# Patient Record
Sex: MALE | Race: WHITE | Employment: FULL TIME | URBAN - METROPOLITAN AREA
[De-identification: names, ages, dates, MRNs, and addresses within clinical notes are randomized per-mention and may not be internally consistent; named-entity substitution may affect disease eponyms.]

---

## 2018-10-09 ENCOUNTER — APPOINTMENT (OUTPATIENT)
Dept: GENERAL RADIOLOGY | Age: 58
End: 2018-10-09
Attending: EMERGENCY MEDICINE
Payer: COMMERCIAL

## 2018-10-09 ENCOUNTER — HOSPITAL ENCOUNTER (EMERGENCY)
Age: 58
Discharge: HOME | End: 2018-10-09
Attending: EMERGENCY MEDICINE
Payer: COMMERCIAL

## 2018-10-09 VITALS
HEART RATE: 77 BPM | HEIGHT: 67 IN | RESPIRATION RATE: 18 BRPM | WEIGHT: 168 LBS | BODY MASS INDEX: 26.37 KG/M2 | TEMPERATURE: 98.1 F | DIASTOLIC BLOOD PRESSURE: 82 MMHG | SYSTOLIC BLOOD PRESSURE: 136 MMHG | OXYGEN SATURATION: 95 %

## 2018-10-09 DIAGNOSIS — M79.672 PAIN IN BOTH FEET: Primary | ICD-10-CM

## 2018-10-09 DIAGNOSIS — M79.671 PAIN IN BOTH FEET: Primary | ICD-10-CM

## 2018-10-09 PROCEDURE — 99283 EMERGENCY DEPT VISIT LOW MDM: CPT | Performed by: EMERGENCY MEDICINE

## 2018-10-09 PROCEDURE — 73630 X-RAY EXAM OF FOOT: CPT

## 2018-10-09 RX ORDER — HYDROCODONE BITARTRATE AND ACETAMINOPHEN 5; 325 MG/1; MG/1
1 TABLET ORAL
Qty: 12 TAB | Refills: 0 | Status: SHIPPED | OUTPATIENT
Start: 2018-10-09 | End: 2021-04-16 | Stop reason: ALTCHOICE

## 2018-10-09 RX ORDER — OMEPRAZOLE 40 MG/1
40 CAPSULE, DELAYED RELEASE ORAL DAILY
COMMUNITY
End: 2021-04-16 | Stop reason: ALTCHOICE

## 2018-10-09 RX ORDER — NAPROXEN 500 MG/1
500 TABLET ORAL 2 TIMES DAILY WITH MEALS
COMMUNITY
End: 2021-09-03 | Stop reason: ALTCHOICE

## 2018-10-09 NOTE — ED TRIAGE NOTES
Pt c/o bilateral foot pain and swelling for 3 weeks, states it actually started 3 years ago but he has just dealt with it. The pain and swelling that started 3 weeks ago is worse. Went to [facility] and they did blood work and eval. States was supposed to follow up with podiatrist but hasn't had a call for an appointment yet.

## 2018-10-09 NOTE — DISCHARGE INSTRUCTIONS
Take Tylenol ES two tablets every six hours  Elevate feet   Ice as needed  Norco at bedtime if needed

## 2018-10-10 NOTE — ED NOTES
I have reviewed discharge instructions with the patient. The patient verbalized understanding. Patient left ED via Discharge Method: ambulatory to Home with self. Opportunity for questions and clarification provided. Patient given 1 scripts. To continue your aftercare when you leave the hospital, you may receive an automated call from our care team to check in on how you are doing. This is a free service and part of our promise to provide the best care and service to meet your aftercare needs.  If you have questions, or wish to unsubscribe from this service please call [phone]. Thank you for Choosing our [facility].

## 2018-10-10 NOTE — ED PROVIDER NOTES
HPI Comments: Patient complains of swelling of his left foot onset 3 weeks ago. Top of the foot over the distal metatarsals area. Pain with walking. Also wakes up at night with walking. He reports chronic bilateral foot pain for several years. Has seen Dr. Lin Velez and has been diagnosed with plantar fasciitis and wore a boot on the right foot and did stretches. Improved. Takes Naprosyn 2 tablets every day for chronic back pain. Seen at Urgent Care 10 days ago and had blood work for gout evaluation. Treated with Prednisone 20 mg daily for 7 days. No change in the left foot swelling or pain. No fever. He works 6 a to 6 p four days a week. Some improvement in feet when resting and elevating. No injury. Patient is a 60 y.o. male presenting with foot swelling. The history is provided by the patient. Foot Swelling This is a new problem. Episode onset: 3 weeks ago. The problem occurs daily. The problem has not changed since onset. The pain is present in the right foot and left foot. The pain is moderate. Associated symptoms include stiffness and back pain. The symptoms are aggravated by activity. He has tried OTC pain medications, cold and heat for the symptoms. There has been no history of extremity trauma. Past Medical History:  
Diagnosis Date  Gastrointestinal disorder History reviewed. No pertinent surgical history. History reviewed. No pertinent family history. Social History Social History  Marital status:  Spouse name: N/A  
 Number of children: N/A  
 Years of education: N/A Occupational History  Not on file. Social History Main Topics  Smoking status: Former Smoker  Smokeless tobacco: Never Used  Alcohol use Yes Comment: every now and then  Drug use: No  
 Sexual activity: Not on file Other Topics Concern  Not on file Social History Narrative  No narrative on file ALLERGIES: Review of patient's allergies indicates no known allergies. Review of Systems Constitutional: Negative. HENT: Negative. Respiratory: Negative. Cardiovascular: Negative. Gastrointestinal: Negative. Genitourinary: Negative. Musculoskeletal: Positive for back pain, myalgias and stiffness. Skin: Negative. Neurological: Negative. Vitals:  
 10/09/18 1756 10/09/18 2004 BP:  136/82 Pulse: 76 77 Resp: 20 18 Temp: 98.1 °F (36.7 °C) SpO2: 98% 95% Weight: 76.2 kg (168 lb) Height: 5' 6.5\" (1.689 m) Physical Exam  
Constitutional: He is oriented to person, place, and time. He appears well-developed and well-nourished. Cardiovascular: Normal rate, regular rhythm, normal heart sounds and intact distal pulses. Pulmonary/Chest: Effort normal and breath sounds normal.  
Musculoskeletal:  
Left foot with tenderness and mild swelling dorsal aspect over the distal aspect of the 2, 3, 4 metatarsals. No redness. No fluctuance. FROM of toes and ankle. No ankle swelling or redness. Right foot normal.   
Neurological: He is alert and oriented to person, place, and time. He exhibits normal muscle tone. Skin: Skin is warm and dry. No rash noted. No erythema. Nursing note and vitals reviewed. MDM 
 
 
ED Course Procedures Left foot pain DDX - stress fracture / metatarsalgia / tendonitis Xray no fracture Results and instructions discussed with patient Rx Rush 5 po hs for unrelieved pain Elevation Ice Follow up with Dr. Small  Return with new or worse symptoms

## 2020-08-05 PROBLEM — I10 ESSENTIAL HYPERTENSION: Status: ACTIVE | Noted: 2020-08-05

## 2020-08-05 PROBLEM — M79.671 BILATERAL FOOT PAIN: Status: ACTIVE | Noted: 2020-08-05

## 2020-08-05 PROBLEM — M79.672 BILATERAL FOOT PAIN: Status: ACTIVE | Noted: 2020-08-05

## 2020-10-09 ENCOUNTER — HOSPITAL ENCOUNTER (OUTPATIENT)
Dept: DIABETES SERVICES | Age: 60
Discharge: HOME | End: 2020-10-09
Payer: COMMERCIAL

## 2020-10-09 PROCEDURE — G0108 DIAB MANAGE TRN  PER INDIV: HCPCS

## 2020-10-09 RX ORDER — NAPROXEN SODIUM 220 MG
220 TABLET ORAL 2 TIMES DAILY WITH MEALS
COMMUNITY

## 2020-10-09 RX ORDER — PHENOL/SODIUM PHENOLATE
20 AEROSOL, SPRAY (ML) MUCOUS MEMBRANE DAILY
COMMUNITY

## 2020-10-09 NOTE — PROGRESS NOTES
This is a one on one appointment. Due to being during DSOQI-12 public health emergency, social distancing and mandatory precautions are in place and utilized. Came for diabetes educational assessment today. Provided basic information on carbohydrates, proteins and fats. Educational need/plan: Will attend 2 nutrition/2 diabetes group sessions to address the following: diabetes disease process, nutritional management, physical activity, using medications, preventing complications, psychosocial adjustment, goal setting, problem solving, monitoring, behavior change strategies. Hopes to gain the following from this educational program: learn how to keep his blood sugar levels down to prevent complications of diabetes. Two things patient needs help with to improve diabetes: Understanding diabetes and diet education. Issues identified:  -New diagnosis. -Exercise: No formal exercise. Active at work. Bilateral foot pain. Medication changes:  Medication reconciliation to be done by RN.

## 2020-11-06 ENCOUNTER — HOSPITAL ENCOUNTER (OUTPATIENT)
Dept: DIABETES SERVICES | Age: 60
Discharge: HOME | End: 2020-11-06
Payer: COMMERCIAL

## 2020-11-06 PROCEDURE — G0109 DIAB MANAGE TRN IND/GROUP: HCPCS

## 2020-11-06 NOTE — PROGRESS NOTES
This is a class  appointment with limited persons allowed in class due to GLTST-60 public health emergency. Social distancing and mandatory precautions are in place and utilized. Attended nutrition diabetes #1 group session today. Topics included: disease process and treatment; carbohydrate choices (emphasizing high fiber carbohydrates); proteins (emphasizing heart healthy choices) and fat food choices (emphasizing unsaturated fats); free foods; combination food choices; nutrition tips for persons with diabetes; snack ideas; resources for diabetes management. Two methods of meal planning were reviewed: carbohydrate choices and carbohydrate counting. Basics of exercise discussed. Voiced /demonstrated understanding of material covered. Anticipated adherence is good. Problems/barriers may be: none anticipated. Encouraged a protein with his snacks. No new procedure or surgery since last visit. Medication changes. RN to do medication reconciliation.

## 2020-11-09 RX ORDER — LANOLIN ALCOHOL/MO/W.PET/CERES
12 CREAM (GRAM) TOPICAL
COMMUNITY
End: 2021-04-16 | Stop reason: ALTCHOICE

## 2020-11-13 ENCOUNTER — HOSPITAL ENCOUNTER (OUTPATIENT)
Dept: DIABETES SERVICES | Age: 60
Discharge: HOME | End: 2020-11-13
Payer: COMMERCIAL

## 2020-11-13 PROCEDURE — G0109 DIAB MANAGE TRN IND/GROUP: HCPCS

## 2020-11-13 NOTE — PROGRESS NOTES
This is a class  appointment with limited persons allowed in class due to [facility]-02 public health emergency. Social distancing and mandatory precautions are in place and utilized. Attended nutrition diabetes #2 group session today. Topics included: plate method for portion control; fiber and sodium guidelines; sugar substitutes; alcohol; eating out; recipe modification; label reading. Participant's goal: To lower A1C he will limit carbohydrates to 60 grams or less at most meals and re-evaluate every 4 months. Participant's diabetes support plan: Use the menu planning worksheet and grocery shopping guide. Barriers identified: None per pt. Voiced/demonstrated understanding of material covered. Anticipated adherence is good. No medication changes, procedures or surgeries since last visit. Plan for follow up is: will attend diabetes class.

## 2020-12-04 ENCOUNTER — HOSPITAL ENCOUNTER (OUTPATIENT)
Dept: DIABETES SERVICES | Age: 60
Discharge: HOME | End: 2020-12-04
Payer: COMMERCIAL

## 2020-12-04 PROCEDURE — G0109 DIAB MANAGE TRN IND/GROUP: HCPCS

## 2020-12-04 NOTE — PROGRESS NOTES
This is a class  appointment with limited persons allowed in class due to [facility]- public health emergency. Social distancing and mandatory precautions are in place and utilized. Participant attended Diabetes #1 session today. Topics included: Characteristics/pathophysiology type 1/type 2 diabetes; Goal/acceptable blood glucose ranges/Hgb A1C/interpreting/using results;meters, continuous glucose monitors and insulin pumps. Using medications safely; Sick day management; Prevention/detection/treatment of acute complications. - Verbalized understanding of material covered.  -Anticipated adherence is good   -Problems/barriers may be : none anticipated. Reports he is becoming more aware of his blood sugar levels by testing blood sugars. . He stated Johne Bell is a fernandez. \"  Reinforced that what he does with the education is up to him. He verbalized understanding. Medication Reconciliation Completed. No surgery or procedure.

## 2020-12-11 ENCOUNTER — HOSPITAL ENCOUNTER (OUTPATIENT)
Dept: DIABETES SERVICES | Age: 60
Discharge: HOME | End: 2020-12-11
Payer: COMMERCIAL

## 2020-12-11 PROCEDURE — G0109 DIAB MANAGE TRN IND/GROUP: HCPCS

## 2020-12-11 NOTE — PROGRESS NOTES
This is a class  appointment with limited persons allowed in class due to GJVAJ-84 public health emergency. Social distancing and mandatory precautions are in place and utilized. Participant attended Diabetes #2 session today. Topics included: Prevention/detection/treatment of chronic complications; sleep apnea; Developing strategies to promote health/change behavior/recommended screenings; Developing strategies to address psychosocial issues; Goal setting. Participants goal/support plan includes:        Diabetes Goal: To keep my blood sugars lower, I will exercise more. Two days a week starting 12- for 15 minutes a session. I will reevaluate on 6-1-2021 and increase as tolerated. Diabetes Support Plan: Refer to Diabetes Education Material.         Problems/barriers may be:none anticipated         Plan for follow up/Recommendations: mail follow up survey at 6 months and one year. Medication Reconciliation Completed. No new surgery or procedures.

## 2020-12-18 PROBLEM — E11.9 CONTROLLED TYPE 2 DIABETES MELLITUS WITHOUT COMPLICATION, WITHOUT LONG-TERM CURRENT USE OF INSULIN (HCC): Status: ACTIVE | Noted: 2020-12-18

## 2020-12-18 PROBLEM — G25.81 RESTLESS LEG SYNDROME: Status: ACTIVE | Noted: 2020-12-18

## 2021-09-03 PROBLEM — M54.50 LOW BACK PAIN POTENTIALLY ASSOCIATED WITH SPINAL STENOSIS: Status: ACTIVE | Noted: 2021-09-03

## 2021-09-03 PROBLEM — R29.898 WEAKNESS OF BOTH LEGS: Status: ACTIVE | Noted: 2021-09-03

## 2021-09-03 PROBLEM — M71.341 OTHER BURSAL CYST, RIGHT HAND: Status: ACTIVE | Noted: 2021-09-03

## 2021-09-03 PROBLEM — K21.9 GASTROESOPHAGEAL REFLUX DISEASE WITHOUT ESOPHAGITIS: Status: ACTIVE | Noted: 2021-09-03

## 2021-09-14 ENCOUNTER — HOSPITAL ENCOUNTER (OUTPATIENT)
Dept: MRI IMAGING | Age: 61
Discharge: HOME | End: 2021-09-14
Attending: INTERNAL MEDICINE
Payer: COMMERCIAL

## 2021-09-14 DIAGNOSIS — R29.898 WEAKNESS OF BOTH LEGS: ICD-10-CM

## 2021-09-14 DIAGNOSIS — M54.50 LOW BACK PAIN POTENTIALLY ASSOCIATED WITH SPINAL STENOSIS: ICD-10-CM

## 2021-09-14 DIAGNOSIS — M79.672 BILATERAL FOOT PAIN: ICD-10-CM

## 2021-09-14 DIAGNOSIS — M79.671 BILATERAL FOOT PAIN: ICD-10-CM

## 2021-09-14 PROCEDURE — 72148 MRI LUMBAR SPINE W/O DYE: CPT

## 2021-09-15 NOTE — PROGRESS NOTES
Does have significant DJD of spine and a disc herniation at L4  Needs to see if surgery is indicated  Refer to Neurosurgery if he is agreeable

## 2021-10-15 ENCOUNTER — HOSPITAL ENCOUNTER (OUTPATIENT)
Dept: PHYSICAL THERAPY | Age: 61
Discharge: HOME | End: 2021-10-15
Payer: COMMERCIAL

## 2021-10-15 PROCEDURE — 97161 PT EVAL LOW COMPLEX 20 MIN: CPT

## 2021-10-15 PROCEDURE — 97110 THERAPEUTIC EXERCISES: CPT

## 2021-10-15 NOTE — PROGRESS NOTES
Abiel Squires  : 1960  Payor: Yaron Melendez / Plan: [address] [facility] PPO / Product Type: PPO /  [id number] [facility] at [address] [place] [address]. Abigail Rincon., [address], [place], [address] [place] [address]  [phone]   [phone]                                                     OUTPATIENT PHYSICAL THERAPY: Daily Treatment Note 10/15/2021 Visit Count:  1    ICD-10: Low back pain (M54.5)  Metatarsalgia, right foot (M77.41)  Metatarsalgia, left foot (M77.42)    Precautions/Allergies:   Patient has no known allergies. MD Orders: Eval and Treat  MEDICAL/REFERRING DIAGNOSIS:  Spinal stenosis, lumbar region without neurogenic claudication [M48.061]  Other intervertebral disc displacement, lumbar region [M51.26]   DATE OF ONSET: Chronic  REFERRING PHYSICIAN: Perez Silva MD  RETURN PHYSICIAN APPOINTMENT: 2 months       Pre-treatment Symptoms/Complaints: See Initial Eval Dated 10/15/21 for more details. Pain: Initial:5/10 B feet, no LBP  Medications Last Reviewed:  10/15/2021     Post Session: 5/10 B feet, no LBP   Updated Objective Findings: See Initial Eval for more details. TREATMENT:   THERAPEUTIC EXERCISE: (15 min minutes):  Exercises per grid below to improve mobility, strength and balance. Required minimal visual, verbal and manual cues to promote proper body alignment and promote proper body posture. Progressed resistance and complexity of movement as indicated. Date:  10/15/2021 Date:   Date:     Activity/Exercise Parameters Parameters Parameters   Education HEP, POC, PT goals, anatomy/pathology, insulin resistance, walking and biking to assist with HTN management, strength training roles in pain and insulin resistance     L stretch instruction                                         THERAPEUTIC ACTIVITY: ( 0 minutes):  Activities per gid below to improve functional movement related mobility, strength and balance to improve neuro-muscular carryover to daily functional activities for improving patient's quality of life. Required visual, verbal and manual cues to promote proper body alignment and promote proper body posture/mechanics. Progressed resistance and complexity of movement as indicated. Date:  10/15/2021 Date:   Date:     Activity/Exercise Parameters Parameters Parameters                                                                               MANUAL THERAPY: (0 minutes): Joint mobilization, Soft tissue mobilization was utilized and necessary because of the patient's restricted joint motion and restricted motion of soft tissue mobility. Date  10/15/2021    Technique Used Grade  Level # Time(s) Effect while being performed                                                                 HEP Log Date 1.    10/15/2021   2.  10/15/2021   3. 10/15/2021   4.    5.           [employer] [facility]  Treatment/Session Summary:    Response to Treatment: Pt demonstrated understanding of POC and initial HEP. No increase in pain or adverse reactions. Communication/Consultation:  POC, HEP, PT goals, Faxed initial evaluation to MD.   Equipment provided today: Pt provided with several education sources to begin a strength training program in addition to information on insulin resistance and pain science   Recommendations/Intent for next treatment session:   eval only         Treatment Plan of Care Effective Dates: 10/15/2021 TO Today (10/15/2021).   Frequency/Duration: 1 time a week for 1 Days       Total Treatment Billable Duration:   15  Rx plus Eval   PT Patient Time In/Time Out  Time In: 1110  Time Out: 1200  Raven Hameed PT    Future Appointments   Date Time Provider Bren Cui   11/15/2021 11:15 AM Loly Torres MD [facility]AR[facility] CESARIO MRMD   12/8/2021  3:30 PM MD Shaye Archerlly[address]ie [address]   1/7/2022  9:40 AM Lori Holder MD [facility]

## 2021-10-15 NOTE — THERAPY EVALUATION
Mikayla Cerda  : 1960      Payor: Ricardo Soria / Plan: [address] [facility] PPO / Product Type: PPO /  Rafa Pena at [address] [facility]. [facility], [address] [place] [address]  [phone]   [phone]       OUTPATIENT PHYSICAL THERAPY:Initial Assessment 10/15/2021    ICD-10: Low back pain (M54.5)  Metatarsalgia, right foot (M77.41)  Metatarsalgia, left foot (M77.42)    Precautions/Allergies:   Patient has no known allergies. MD Orders: Eval and Treat  MEDICAL/REFERRING DIAGNOSIS:  Spinal stenosis, lumbar region without neurogenic claudication [M48.061]  Other intervertebral disc displacement, lumbar region [M51.26]   DATE OF ONSET: Chronic  REFERRING PHYSICIAN: Jami Renteria MD  RETURN PHYSICIAN APPOINTMENT: 2 months     INITIAL ASSESSMENT:  Mikayla Cerda presents to physical therapy with a history of chronic back pain and B neuropathic pain in feet due to complications frm diabetes. Pt reports that he has been able to self manage LBP with exercises implemented from physical therapy in . Pt major complaint today in his B foot pain and sensitivity. Pt pain pattern appears to be more consistent with diabetic neuropathy as pain intensity in L4-S1 distribution is unchanged through biomechanical means. Pt has begun an increased dose of gabapentin as on 10/14/21 and will undergo nerve conduction testing to confirm diagnosis of neuropathy. pt increased  gabapentin dose has not has time to produce a therapeutic response as at this point does not give an indication of his positive response. Mikayla Cerda reports he is able to complete all that is required of him except that he requires a prolonged amount of time to rest and recover due to sensitivity of B foot neuropathic pain. Therapist and pt discuss that therapy will not have effect on neuropathic pain, but that pt should continue a wellness program to promote health and strength.  Pt elects to complete exercises independently with therapist recommending several resources to begin a strengthening program and assist with blood glucose stability. Pt elects for eval only due to the above described condition. PT POC closed. TREATMENT PLAN:  Effective Dates: 10/15/2021 TO Today (10/15/2021). Frequency/Duration: 1 time a week for 1 Days    GOALS: (Goals have been discussed and agreed upon with patient.)   Short-Term Goals: 1 days  Goal Met   1. Tiara Duty will be verbalize understanding of importance of wellness program to manage LBP and diabetes and will be given educational material to assist in his understanding for home implementation. 1.  [] Date: 10/15/21     CLINICAL DECISION MAKING:      Outcome Measure: Tool Used: Modified Oswestry Low Back Pain Questionnaire - pt filled out form as it pertained to his B foot pain  Score:  Initial: 27/50  Most Recent: X/50 (Date: -- )   Interpretation of Score: Each section is scored on a 0-5 scale, 5 representing the greatest disability. The scores of each section are added together for a total score of 50. Total Duration: 30 minutes  PT Patient Time In/Time Out  Time In: 1110  Time Out: 1200    Regarding Waldemar Ryan's therapy, I certify that the treatment plan above will be carried out by a therapist or under their direction. Thank you for this referral,  Kumar Tran, PT     Referring Physician Signature: Christain Klinefelter, MD             HISTORY:   History of Present Injury/Illness (Reason for Referral):    Pt with chronic pain that began in B feet. R > L that appears more related to neuropathy that pt will get tested for Nov 15th. MD has increased gabapentin to assist with pain in B feet. Pt is on his feet for 12 plus hours and increased throughout the day. Pt works 4, 10 hour days. Pt reports that he has minimal back pain that he is able to manage with his physical therapy in 2004.  Pt has diabetes that has impacted bones in jaw with recurrent infections. Pt works as a  and reports intermittent issues with back pain with stooping and bending over - however able to manage with exercises from previous therapy. GOAL FOR THERAPY: to relieve foot pain    Note: Patient denies any increase of symptoms with cough, sneeze or valsalva. Patient denies any saddle paresthesia or bowel/bladder deficits. · Aggravating factors: Lifting and stooping adn squatting when in a flare up for low back   · Relieving factors: Rest    -Present symptoms/complaints (on day of evaluation)  Pain Scale:  · No back pain, however report constant B foot pain that can reach 5/10 that get worse as he is on his feet at work      Level of Function/Work/Activity:  Current functional level: B foot pain that increases with prolonged standing and walking, able to manage flare ups of back pain with therapy exercises  Prior level of Function: gradual increase in pain in B Feet,   Work/Hobbies:     Previous Treatment Approaches:  PT in 2004 for LBP    Past Medical History/Comorbidities:   Mr. 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 He also has no past medical history of Sleep apnea. Mr. Larisa Arrington  has no past surgical history on file.   Active Ambulatory Problems     Diagnosis Date Noted    Bilateral foot pain 08/05/2020    Essential hypertension 08/05/2020    Controlled type 2 diabetes mellitus without complication, without long-term current use of insulin ([address] 12/18/2020    Restless leg syndrome 12/18/2020    Gastroesophageal reflux disease without esophagitis 09/03/2021    Other bursal cyst, right hand 09/03/2021    Low back pain potentially associated with spinal stenosis 09/03/2021    Weakness of both legs 09/03/2021     Resolved Ambulatory Problems     Diagnosis Date Noted    No Resolved Ambulatory Problems     Past Medical History:   Diagnosis Date    Abnormal finding on EKG 08/05/2020    Chronic back pain     HTN (hypertension)     Hx stress fracture     Impingement syndrome of right shoulder 2020    Insomnia     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 Dominant Side right  Other Clinical Tests:  Imaging: YES radiographs    Current Medications:       Current Outpatient Medications:     metFORMIN (GLUCOPHAGE) 500 mg tablet, TAKE 1 TABLET BY MOUTH TWICE A DAY WITH MEALS, Disp: 180 Tablet, Rfl: 2    atorvastatin (LIPITOR) 20 mg tablet, Take 1 Tablet by mouth daily. , Disp: 30 Tablet, Rfl: 11    olmesartan (BENICAR) 20 mg tablet, TAKE 1 TABLET BY MOUTH EVERY DAY, Disp: 30 Tablet, Rfl: 5    gabapentin (NEURONTIN) 300 mg capsule, Take 1 Cap by mouth nightly., Disp: 90 Cap, Rfl: 2    naproxen sodium (NAPROSYN) 220 mg tablet, Take 220 mg by mouth two (2) times daily (with meals). Patient reports taking this 3 tabs 2 times per day  Indications: Reported on 10-9-2020 taking over the counter above, Disp: , Rfl:     Omeprazole delayed release (PRILOSEC D/R) 20 mg tablet, Take 20 mg by mouth daily. Indications: Reported 10-9-2020 taking over the counter. but likes to skip two times a week., Disp: , Rfl:    Date Last Reviewed:  10/15/2021         Ambulatory/Rehab Services H2 Model Falls Risk Assessment   Risk Factors:       (1)  Gender [Male] Ability to Rise from Chair:       (0)  Ability to rise in a single movement   Falls Prevention Plan:       No modifications necessary   Total: (5 or greater = High Risk): 2   ©2010 [employer]. All Rights Reserved.  [facility] States Patent #[id number]. Federal Law prohibits the replication, distribution or use without written permission from [facility] [employer] [facility]      Number of Personal Factors/Comorbidities that affect the Plan of Care: 0: LOW COMPLEXITY   EXAMINATION:     Functional Mobility: Affecting participation in basic ADLs and functional tasks. Gait Observation: none  Squat with downward reach: able to complete  Forward reach: able to complete no issues  Sit to Stand: able to complete, no issues    Palpation and Joint Mobility:         Inspection of back is normal     ROM:         Lumbar ROM (Qualitative)    Movement Range Descriptor   Flexion Hands to Shin Unremarkable   Extension Spine of Scapula to midline Unremarkable       AROM/PROM         Joint: Eval Date: 10/15/21  Re-Assess Date:  Re-Assess Date:    Active ROM RIGHT LEFT RIGHT LEFT RIGHT LEFT   Hip Flexion [facility] PEMBRO       Hip Extension [facility]       Knee Flexion [facility] WFL       Knee Extension [facility] WFL         Strength:     Eval Date: 10/15/21  Re-Assess Date:  Re-Assess Date:      RIGHT LEFT RIGHT LEFT RIGHT LEFT   Knee Flexion (L5-S2) 5/5 5/5       Knee Extension (L3, L4) 5/5 5/5       Hip Flexion (L1, L2) 5/5 5/5       Hip Extension 5/5 5/5       Hip Abduction (L5, S1) 5/5 5/5       Hip External Rotation 5/5 5/5       Ankle Dorsiflexion  5/5 5/5       Ankle Plantar Flexion (S1-S2) NT/20 Heel Raises NT/20 Heel Raises       Strength:  0-5 scale, 0 no muscle contraction; 1 no joint motion but contraction felt; 2 -less than full ROM in gravity eliminated; 2 full ROM in grav eliminated; 2+ full ROM in grav eliminated and natasha to withstand minimal resist; 3-less than full ROM against gravity; 3 full ROM against gravity;  3+ full ROM against gravity and able to withstand minimal resist; 4- full ROM against gravity and able to withstand less than mod resist; 4 full ROM against gravity and able to withstand mod resist; 5 full ROM against gravity and able to withstand max resist. Neurological Screen:    Neuropathic pain in the following:    · L4-5 (Dorsum foot)  · L5-S1 (Lateral foot)     Body Structures Involved:  1. Bones  2. Joints  3. Muscles  4. Ligaments Body Functions Affected:  1. Sensory/Pain  2. Neuromusculoskeletal  3. Movement Related Activities and Participation Affected:  1. Mobility  2.  Self Care   Number of elements that affect the Plan of Care: 1-2: LOW COMPLEXITY   CLINICAL PRESENTATION:   Presentation: Stable and uncomplicated: LOW COMPLEXITY   CLINICAL DECISION MAKING:      Use of outcome tool(s) and clinical judgement create a POC that gives a: Clear prediction of patient's progress: LOW COMPLEXITY

## 2022-01-07 PROBLEM — E78.2 MIXED HYPERLIPIDEMIA: Status: ACTIVE | Noted: 2022-01-07

## 2022-03-18 PROBLEM — E78.2 MIXED HYPERLIPIDEMIA: Status: ACTIVE | Noted: 2022-01-07

## 2022-03-18 PROBLEM — M71.341 OTHER BURSAL CYST, RIGHT HAND: Status: ACTIVE | Noted: 2021-09-03

## 2022-03-19 PROBLEM — M54.50 LOW BACK PAIN POTENTIALLY ASSOCIATED WITH SPINAL STENOSIS: Status: ACTIVE | Noted: 2021-09-03

## 2022-03-19 PROBLEM — E11.9 CONTROLLED TYPE 2 DIABETES MELLITUS WITHOUT COMPLICATION, WITHOUT LONG-TERM CURRENT USE OF INSULIN (HCC): Status: ACTIVE | Noted: 2020-12-18

## 2022-03-19 PROBLEM — R29.898 WEAKNESS OF BOTH LEGS: Status: ACTIVE | Noted: 2021-09-03

## 2022-03-19 PROBLEM — M79.672 BILATERAL FOOT PAIN: Status: ACTIVE | Noted: 2020-08-05

## 2022-03-19 PROBLEM — M79.671 BILATERAL FOOT PAIN: Status: ACTIVE | Noted: 2020-08-05

## 2022-03-19 PROBLEM — G25.81 RESTLESS LEG SYNDROME: Status: ACTIVE | Noted: 2020-12-18

## 2022-03-19 PROBLEM — K21.9 GASTROESOPHAGEAL REFLUX DISEASE WITHOUT ESOPHAGITIS: Status: ACTIVE | Noted: 2021-09-03

## 2022-03-19 PROBLEM — I10 ESSENTIAL HYPERTENSION: Status: ACTIVE | Noted: 2020-08-05

## 2022-07-08 ENCOUNTER — OFFICE VISIT (OUTPATIENT)
Dept: INTERNAL MEDICINE CLINIC | Facility: CLINIC | Age: 62
End: 2022-07-08
Payer: COMMERCIAL

## 2022-07-08 VITALS
HEIGHT: 66 IN | SYSTOLIC BLOOD PRESSURE: 124 MMHG | DIASTOLIC BLOOD PRESSURE: 60 MMHG | WEIGHT: 168 LBS | BODY MASS INDEX: 27 KG/M2

## 2022-07-08 DIAGNOSIS — M53.3 CHRONIC SI JOINT PAIN: ICD-10-CM

## 2022-07-08 DIAGNOSIS — E78.2 MIXED HYPERLIPIDEMIA: ICD-10-CM

## 2022-07-08 DIAGNOSIS — I10 ESSENTIAL HYPERTENSION: ICD-10-CM

## 2022-07-08 DIAGNOSIS — E11.9 CONTROLLED TYPE 2 DIABETES MELLITUS WITHOUT COMPLICATION, WITHOUT LONG-TERM CURRENT USE OF INSULIN (HCC): ICD-10-CM

## 2022-07-08 DIAGNOSIS — G25.81 RESTLESS LEG SYNDROME: ICD-10-CM

## 2022-07-08 DIAGNOSIS — K21.9 GASTROESOPHAGEAL REFLUX DISEASE WITHOUT ESOPHAGITIS: ICD-10-CM

## 2022-07-08 DIAGNOSIS — E11.9 CONTROLLED TYPE 2 DIABETES MELLITUS WITHOUT COMPLICATION, WITHOUT LONG-TERM CURRENT USE OF INSULIN (HCC): Primary | ICD-10-CM

## 2022-07-08 DIAGNOSIS — E11.9 ENCOUNTER FOR DIABETIC FOOT EXAM (HCC): ICD-10-CM

## 2022-07-08 DIAGNOSIS — Z12.5 PROSTATE CANCER SCREENING: ICD-10-CM

## 2022-07-08 DIAGNOSIS — Q66.70 HIGH FOOT ARCH: ICD-10-CM

## 2022-07-08 DIAGNOSIS — G89.29 CHRONIC SI JOINT PAIN: ICD-10-CM

## 2022-07-08 LAB
BASOPHILS # BLD: 0.1 K/UL (ref 0–0.2)
BASOPHILS NFR BLD: 1 % (ref 0–2)
DIFFERENTIAL METHOD BLD: ABNORMAL
EOSINOPHIL # BLD: 0.3 K/UL (ref 0–0.8)
EOSINOPHIL NFR BLD: 4 % (ref 0.5–7.8)
ERYTHROCYTE [DISTWIDTH] IN BLOOD BY AUTOMATED COUNT: 14 % (ref 11.9–14.6)
HCT VFR BLD AUTO: 42.3 % (ref 41.1–50.3)
HGB BLD-MCNC: 13.3 G/DL (ref 13.6–17.2)
IMM GRANULOCYTES # BLD AUTO: 0 K/UL (ref 0–0.5)
IMM GRANULOCYTES NFR BLD AUTO: 0 % (ref 0–5)
LYMPHOCYTES # BLD: 2.4 K/UL (ref 0.5–4.6)
LYMPHOCYTES NFR BLD: 31 % (ref 13–44)
MCH RBC QN AUTO: 29.7 PG (ref 26.1–32.9)
MCHC RBC AUTO-ENTMCNC: 31.4 G/DL (ref 31.4–35)
MCV RBC AUTO: 94.4 FL (ref 79.6–97.8)
MONOCYTES # BLD: 0.9 K/UL (ref 0.1–1.3)
MONOCYTES NFR BLD: 12 % (ref 4–12)
NEUTS SEG # BLD: 4 K/UL (ref 1.7–8.2)
NEUTS SEG NFR BLD: 52 % (ref 43–78)
NRBC # BLD: 0 K/UL (ref 0–0.2)
PLATELET # BLD AUTO: 134 K/UL (ref 150–450)
PMV BLD AUTO: 14.1 FL (ref 9.4–12.3)
RBC # BLD AUTO: 4.48 M/UL (ref 4.23–5.6)
WBC # BLD AUTO: 7.7 K/UL (ref 4.3–11.1)

## 2022-07-08 PROCEDURE — 3051F HG A1C>EQUAL 7.0%<8.0%: CPT | Performed by: INTERNAL MEDICINE

## 2022-07-08 PROCEDURE — 99214 OFFICE O/P EST MOD 30 MIN: CPT | Performed by: INTERNAL MEDICINE

## 2022-07-08 RX ORDER — LANOLIN ALCOHOL/MO/W.PET/CERES
1000 CREAM (GRAM) TOPICAL DAILY
COMMUNITY

## 2022-07-08 RX ORDER — PREGABALIN 150 MG/1
150 CAPSULE ORAL 2 TIMES DAILY
COMMUNITY

## 2022-07-08 RX ORDER — MULTIVIT-MIN/IRON/FOLIC ACID/K 18-600-40
CAPSULE ORAL
COMMUNITY

## 2022-07-08 ASSESSMENT — PATIENT HEALTH QUESTIONNAIRE - PHQ9
SUM OF ALL RESPONSES TO PHQ QUESTIONS 1-9: 0
2. FEELING DOWN, DEPRESSED OR HOPELESS: 0
SUM OF ALL RESPONSES TO PHQ QUESTIONS 1-9: 0
SUM OF ALL RESPONSES TO PHQ QUESTIONS 1-9: 0
1. LITTLE INTEREST OR PLEASURE IN DOING THINGS: 0
SUM OF ALL RESPONSES TO PHQ QUESTIONS 1-9: 0
SUM OF ALL RESPONSES TO PHQ9 QUESTIONS 1 & 2: 0

## 2022-07-08 ASSESSMENT — ENCOUNTER SYMPTOMS
BACK PAIN: 1
CHEST TIGHTNESS: 0
SHORTNESS OF BREATH: 0

## 2022-07-08 NOTE — PROGRESS NOTES
HPI: Myrna Solorio (: 1960)    Wants to make sure his heart is ok as his boss just had quad bypass  He has risk factors for ASCVD as well    He is now on Lyrica for foot pain but he states the Gabapentin helps the RLS better  Did recently get a new pair of shoes - he walks on the balls of his feet and can walk over 10 K steps daily    Need to update labs    Problem List:  Patient Active Problem List   Diagnosis    Mixed hyperlipidemia    Other bursal cyst, right hand    Low back pain potentially associated with spinal stenosis    Restless leg syndrome    Controlled type 2 diabetes mellitus without complication, without long-term current use of insulin (HCC)    Weakness of both legs    Bilateral foot pain    Gastroesophageal reflux disease without esophagitis    Essential hypertension    High foot arch    Chronic SI joint pain       History:  Past Medical History:   Diagnosis Date    Abnormal finding on EKG 2020    low voltage and incomplete RBBB    Bilateral foot pain     Chronic back pain     HTN (hypertension)     Hx stress fracture     left foot    Impingement syndrome of right shoulder     Dr. Earl Chappell Insomnia     per pt work up with pulmonary- no sleep apnea    Periodontal disease due to type 2 diabetes mellitus ([facility]     Dr. Kiki Hugo- dentist    Plantar fasciitis, bilateral     RLS (restless legs syndrome)        Allergies:  No Known Allergies    Current Medications:  Current Outpatient Medications   Medication Sig Dispense Refill    pregabalin (LYRICA) 150 MG capsule Take 150 mg by mouth 2 times daily.       vitamin B-12 (CYANOCOBALAMIN) 1000 MCG tablet Take 1,000 mcg by mouth daily      Cholecalciferol (VITAMIN D) 50 MCG ( UT) CAPS capsule Take by mouth      metFORMIN (GLUCOPHAGE) 500 MG tablet TAKE 1 TABLET BY MOUTH TWICE A DAY WITH MEALS 180 tablet 1    atorvastatin (LIPITOR) 20 MG tablet Take 20 mg by mouth daily      naproxen sodium (ANAPROX) 220 MG tablet Take 220 mg by mouth 2 times daily (with meals)      olmesartan (BENICAR) 20 MG tablet TAKE 1 TABLET BY MOUTH EVERY DAY      omeprazole (PRILOSEC OTC) 20 MG tablet Take 20 mg by mouth daily       No current facility-administered medications for this visit. Review of Systems:  Review of Systems   Constitutional: Negative for unexpected weight change. Respiratory: Negative for chest tightness and shortness of breath. Cardiovascular: Negative for chest pain. Musculoskeletal: Positive for back pain and gait problem. Bilateral foot pain   Psychiatric/Behavioral: Positive for sleep disturbance. Restless legs   All other systems reviewed and are negative. Vitals:  /60   Ht 5' 6\" (1.676 m)   Wt 168 lb (76.2 kg)   BMI 27.12 kg/m²     Physical Exam:  Physical Exam  Vitals reviewed. Constitutional:       Appearance: Normal appearance. HENT:      Head: Normocephalic and atraumatic. Eyes:      Extraocular Movements: Extraocular movements intact. Pupils: Pupils are equal, round, and reactive to light. Cardiovascular:      Rate and Rhythm: Normal rate and regular rhythm. Heart sounds: Normal heart sounds. Pulmonary:      Effort: Pulmonary effort is normal.      Breath sounds: Normal breath sounds. Musculoskeletal:         General: Normal range of motion. Cervical back: Normal range of motion and neck supple. Comments: High arches   Skin:     General: Skin is warm and dry. Findings: No lesion. Comments: No callous or ulcers noted on DFE   Neurological:      General: No focal deficit present. Mental Status: He is alert and oriented to person, place, and time. Psychiatric:         Mood and Affect: Mood normal.         Behavior: Behavior normal.         Thought Content: Thought content normal.         Judgment: Judgment normal.          Assessment/Plan:   Jorge Garcia was seen today for follow-up.     Diagnoses and all orders for this visit:    Controlled type 2 diabetes mellitus without complication, without long-term current use of insulin (HCC)  -     CBC with Auto Differential; Future  -     Comprehensive Metabolic Panel; Future  -     Hemoglobin A1C; Future    Mixed hyperlipidemia  -     Lipid Panel; Future    Essential hypertension  -     CBC with Auto Differential; Future  -     Comprehensive Metabolic Panel; Future  -     CT CARDIAC CALCIUM SCORING; Future  BP is controlled with current med  Gastroesophageal reflux disease without esophagitis  Continue Prilosec  Prostate cancer screening  -     PSA Screening; Future    Encounter for diabetic foot exam ([facility]  Does have high arches and tenderness - ball of foot  But no ulcers or callous formation  High foot arch    Chronic SI joint pain  Has had to see chiropractor in the past  Restless leg syndrome  Try Tonic water with Quinine at bedtime          Current medications are therapeutic at this time; continue as prescribed.         Kayla Corona MD

## 2022-07-09 LAB
ALBUMIN SERPL-MCNC: 4.2 G/DL (ref 3.2–4.6)
ALBUMIN/GLOB SERPL: 1.4 {RATIO} (ref 1.2–3.5)
ALP SERPL-CCNC: 140 U/L (ref 50–136)
ALT SERPL-CCNC: 66 U/L (ref 12–65)
ANION GAP SERPL CALC-SCNC: 12 MMOL/L (ref 7–16)
AST SERPL-CCNC: 35 U/L (ref 15–37)
BILIRUB SERPL-MCNC: 0.5 MG/DL (ref 0.2–1.1)
BUN SERPL-MCNC: 24 MG/DL (ref 8–23)
CALCIUM SERPL-MCNC: 9.7 MG/DL (ref 8.3–10.4)
CHLORIDE SERPL-SCNC: 110 MMOL/L (ref 98–107)
CHOLEST SERPL-MCNC: 120 MG/DL
CO2 SERPL-SCNC: 19 MMOL/L (ref 21–32)
CREAT SERPL-MCNC: 1.2 MG/DL (ref 0.8–1.5)
EST. AVERAGE GLUCOSE BLD GHB EST-MCNC: 166 MG/DL
GLOBULIN SER CALC-MCNC: 3 G/DL (ref 2.3–3.5)
GLUCOSE SERPL-MCNC: 185 MG/DL (ref 65–100)
HBA1C MFR BLD: 7.4 % (ref 4.8–5.6)
HDLC SERPL-MCNC: 29 MG/DL (ref 40–60)
HDLC SERPL: 4.1 {RATIO}
LDLC SERPL CALC-MCNC: 46.8 MG/DL
POTASSIUM SERPL-SCNC: 5.6 MMOL/L (ref 3.5–5.1)
PROT SERPL-MCNC: 7.2 G/DL (ref 6.3–8.2)
PSA SERPL-MCNC: 1.1 NG/ML
SODIUM SERPL-SCNC: 141 MMOL/L (ref 138–145)
TRIGL SERPL-MCNC: 221 MG/DL (ref 35–150)
VLDLC SERPL CALC-MCNC: 44.2 MG/DL (ref 6–23)

## 2022-07-11 DIAGNOSIS — D64.9 ANEMIA, UNSPECIFIED TYPE: Primary | ICD-10-CM

## 2022-07-11 DIAGNOSIS — I10 ESSENTIAL HYPERTENSION: ICD-10-CM

## 2022-07-11 DIAGNOSIS — E87.5 HYPERKALEMIA: ICD-10-CM

## 2022-07-15 DIAGNOSIS — D64.9 ANEMIA, UNSPECIFIED TYPE: ICD-10-CM

## 2022-07-15 DIAGNOSIS — I10 ESSENTIAL HYPERTENSION: ICD-10-CM

## 2022-07-15 DIAGNOSIS — E87.5 HYPERKALEMIA: ICD-10-CM

## 2022-07-15 LAB
ANION GAP SERPL CALC-SCNC: 2 MMOL/L (ref 7–16)
BASOPHILS # BLD: 0.1 K/UL (ref 0–0.2)
BASOPHILS NFR BLD: 1 % (ref 0–2)
BUN SERPL-MCNC: 24 MG/DL (ref 8–23)
CALCIUM SERPL-MCNC: 9 MG/DL (ref 8.3–10.4)
CHLORIDE SERPL-SCNC: 109 MMOL/L (ref 98–107)
CO2 SERPL-SCNC: 28 MMOL/L (ref 21–32)
CREAT SERPL-MCNC: 1.1 MG/DL (ref 0.8–1.5)
DIFFERENTIAL METHOD BLD: ABNORMAL
EOSINOPHIL # BLD: 0.2 K/UL (ref 0–0.8)
EOSINOPHIL NFR BLD: 3 % (ref 0.5–7.8)
ERYTHROCYTE [DISTWIDTH] IN BLOOD BY AUTOMATED COUNT: 13.9 % (ref 11.9–14.6)
GLUCOSE SERPL-MCNC: 152 MG/DL (ref 65–100)
HCT VFR BLD AUTO: 41.7 % (ref 41.1–50.3)
HGB BLD-MCNC: 13.1 G/DL (ref 13.6–17.2)
IMM GRANULOCYTES # BLD AUTO: 0 K/UL (ref 0–0.5)
IMM GRANULOCYTES NFR BLD AUTO: 0 % (ref 0–5)
IRON SERPL-MCNC: 63 UG/DL (ref 35–150)
LYMPHOCYTES # BLD: 2 K/UL (ref 0.5–4.6)
LYMPHOCYTES NFR BLD: 37 % (ref 13–44)
MCH RBC QN AUTO: 29.6 PG (ref 26.1–32.9)
MCHC RBC AUTO-ENTMCNC: 31.4 G/DL (ref 31.4–35)
MCV RBC AUTO: 94.1 FL (ref 79.6–97.8)
MONOCYTES # BLD: 0.5 K/UL (ref 0.1–1.3)
MONOCYTES NFR BLD: 10 % (ref 4–12)
NEUTS SEG # BLD: 2.7 K/UL (ref 1.7–8.2)
NEUTS SEG NFR BLD: 50 % (ref 43–78)
NRBC # BLD: 0 K/UL (ref 0–0.2)
PLATELET # BLD AUTO: 138 K/UL (ref 150–450)
PMV BLD AUTO: 14 FL (ref 9.4–12.3)
POTASSIUM SERPL-SCNC: 4.9 MMOL/L (ref 3.5–5.1)
RBC # BLD AUTO: 4.43 M/UL (ref 4.23–5.6)
SODIUM SERPL-SCNC: 139 MMOL/L (ref 138–145)
VIT B12 SERPL-MCNC: 943 PG/ML (ref 193–986)
WBC # BLD AUTO: 5.4 K/UL (ref 4.3–11.1)

## 2022-07-29 ENCOUNTER — HOSPITAL ENCOUNTER (OUTPATIENT)
Dept: CT IMAGING | Age: 62
Discharge: HOME | End: 2022-08-01

## 2022-07-29 DIAGNOSIS — I10 ESSENTIAL HYPERTENSION: ICD-10-CM

## 2022-07-29 PROCEDURE — 75571 CT HRT W/O DYE W/CA TEST: CPT

## 2022-07-31 DIAGNOSIS — E78.2 MIXED HYPERLIPIDEMIA: ICD-10-CM

## 2022-07-31 DIAGNOSIS — R93.1 ABNORMAL HEART SCORE CT: ICD-10-CM

## 2022-07-31 DIAGNOSIS — E11.9 CONTROLLED TYPE 2 DIABETES MELLITUS WITHOUT COMPLICATION, WITHOUT LONG-TERM CURRENT USE OF INSULIN (HCC): ICD-10-CM

## 2022-07-31 DIAGNOSIS — I10 ESSENTIAL HYPERTENSION: Primary | ICD-10-CM

## 2022-08-01 RX ORDER — OLMESARTAN MEDOXOMIL 20 MG/1
TABLET ORAL
Qty: 90 TABLET | Refills: 1 | Status: SHIPPED | OUTPATIENT
Start: 2022-08-01

## 2022-08-30 RX ORDER — ATORVASTATIN CALCIUM 20 MG/1
TABLET, FILM COATED ORAL
Qty: 90 TABLET | Refills: 2 | Status: ON HOLD | OUTPATIENT
Start: 2022-08-30 | End: 2022-10-21 | Stop reason: SDUPTHER

## 2022-09-06 DIAGNOSIS — D64.9 ANEMIA, UNSPECIFIED TYPE: Primary | ICD-10-CM

## 2022-09-06 LAB
FECAL OCCULT BLOOD #2, POC: NEGATIVE
FECAL OCCULT BLOOD #3, POC: POSITIVE
HEMOCCULT STL QL: NEGATIVE
VALID INTERNAL CONTROL: YES

## 2022-09-06 PROCEDURE — 82270 OCCULT BLOOD FECES: CPT | Performed by: INTERNAL MEDICINE

## 2022-09-09 ENCOUNTER — INITIAL CONSULT (OUTPATIENT)
Dept: CARDIOLOGY CLINIC | Age: 62
End: 2022-09-09
Payer: COMMERCIAL

## 2022-09-09 VITALS
HEART RATE: 61 BPM | SYSTOLIC BLOOD PRESSURE: 140 MMHG | BODY MASS INDEX: 27.35 KG/M2 | HEIGHT: 66 IN | DIASTOLIC BLOOD PRESSURE: 70 MMHG | WEIGHT: 170.2 LBS

## 2022-09-09 DIAGNOSIS — E11.9 CONTROLLED TYPE 2 DIABETES MELLITUS WITHOUT COMPLICATION, WITHOUT LONG-TERM CURRENT USE OF INSULIN (HCC): ICD-10-CM

## 2022-09-09 DIAGNOSIS — D64.9 ANEMIA, UNSPECIFIED TYPE: ICD-10-CM

## 2022-09-09 DIAGNOSIS — E78.2 MIXED HYPERLIPIDEMIA: ICD-10-CM

## 2022-09-09 DIAGNOSIS — R93.1 AGATSTON CORONARY ARTERY CALCIUM SCORE GREATER THAN 400: ICD-10-CM

## 2022-09-09 DIAGNOSIS — I10 ESSENTIAL HYPERTENSION: Primary | ICD-10-CM

## 2022-09-09 DIAGNOSIS — R19.5 HEME POSITIVE STOOL: Primary | ICD-10-CM

## 2022-09-09 PROCEDURE — 99203 OFFICE O/P NEW LOW 30 MIN: CPT | Performed by: INTERNAL MEDICINE

## 2022-09-09 PROCEDURE — 93000 ELECTROCARDIOGRAM COMPLETE: CPT | Performed by: INTERNAL MEDICINE

## 2022-09-09 PROCEDURE — 2022F DILAT RTA XM EVC RTNOPTHY: CPT | Performed by: INTERNAL MEDICINE

## 2022-09-09 PROCEDURE — G8419 CALC BMI OUT NRM PARAM NOF/U: HCPCS | Performed by: INTERNAL MEDICINE

## 2022-09-09 PROCEDURE — G8427 DOCREV CUR MEDS BY ELIG CLIN: HCPCS | Performed by: INTERNAL MEDICINE

## 2022-09-09 RX ORDER — ICOSAPENT ETHYL 1000 MG/1
2 CAPSULE ORAL 2 TIMES DAILY
Qty: 120 CAPSULE | Refills: 5 | Status: SHIPPED | OUTPATIENT
Start: 2022-09-09 | End: 2022-10-13

## 2022-09-09 ASSESSMENT — ENCOUNTER SYMPTOMS
ORTHOPNEA: 0
SHORTNESS OF BREATH: 0

## 2022-09-09 NOTE — PROGRESS NOTES
[address]  [address]  [address]  PHONE: [address]  1960    SUBJECTIVE:   Sonu Proctor is a 60 y.o. male seen for a consultation visit regarding the following:     Chief Complaint   Patient presents with    Consultation    Hypertension    Other     Calcium score            HPI:  Consultation is requested by [unfilled] for evaluation of Consultation, Hypertension, and Other (Calcium score)   . 59-year-old male referred to me for evaluation of abnormal calcium score 469. He is never known to have any heart disease and denies any chest pain or shortness of breath. He does have some risk factors including hyperlipidemia mild hypertension. He does not currently smoke. No early family history heart disease           Past Medical History, Past Surgical History, Family history, Social History, and Medications were all reviewed with the patient today and updated as necessary. No Known Allergies  Past Medical History:   Diagnosis Date    Abnormal finding on EKG 08/05/2020    low voltage and incomplete RBBB    Bilateral foot pain     Chronic back pain     HTN (hypertension)     Hx stress fracture     left foot    Impingement syndrome of right shoulder 2020    Dr. Nieves Prows    Insomnia     per pt work up with pulmonary- no sleep apnea    Periodontal disease due to type 2 diabetes mellitus ([facility] [address]     Dr. Cunningham Asa- dentist    Plantar fasciitis, bilateral     RLS (restless legs syndrome)      No past surgical history on file. Family History   Problem Relation Age of Onset    Diabetes Sister     COPD Father     Stroke Mother      Social History     Tobacco Use    Smoking status: Former    Smokeless tobacco: Never   Substance Use Topics    Alcohol use: Yes       ROS:    Review of Systems   Constitutional: Negative for decreased appetite.    Cardiovascular:  Negative for chest pain, dyspnea on exertion, irregular heartbeat, leg swelling, near-syncope, orthopnea and palpitations. Respiratory:  Negative for shortness of breath. Hematologic/Lymphatic: Negative for bleeding problem. Neurological:  Negative for dizziness, focal weakness, headaches and weakness. PHYSICAL EXAM:   BP (!) 140/70   Pulse 61   Ht 5' 6\" (1.676 m)   Wt 170 lb 3.2 oz (77.2 kg) Comment: with shoes  BMI 27.47 kg/m²      Physical Exam  Constitutional:       General: He is not in acute distress. Cardiovascular:      Rate and Rhythm: Normal rate and regular rhythm. Pulses: Normal pulses. Heart sounds: No murmur heard. No gallop. Pulmonary:      Effort: Pulmonary effort is normal.      Breath sounds: No rales. Abdominal:      General: Abdomen is flat. Bowel sounds are normal.   Musculoskeletal:         General: No swelling. Neurological:      General: No focal deficit present. Mental Status: He is alert. Medical problems and test results were reviewed with the patient today. Results for orders placed or performed in visit on 09/09/22   EKG 12 Lead    Impression    Normal sinus rhythm rate of 61. Normal intervals. No significant ST changes       Lab Results   Component Value Date/Time     07/15/2022 10:49 AM    K 4.9 07/15/2022 10:49 AM     07/15/2022 10:49 AM    CO2 28 07/15/2022 10:49 AM    BUN 24 07/15/2022 10:49 AM    GFRAA >60 07/15/2022 10:49 AM     Lab Results   Component Value Date/Time    CHOL 120 07/08/2022 09:44 AM    HDL 29 07/08/2022 09:44 AM    VLDL 19 01/07/2022 10:45 AM     Lab Results   Component Value Date/Time    ALT 66 07/08/2022 09:44 AM   Triglycerides 221  ASSESSMENT and PLAN    Shmuel Hoffman was seen today for consultation, hypertension and other. Diagnoses and all orders for this visit:    Essential hypertension patient's blood pressure he says has been fairly stable.   Continue his Benicar  -     EKG 12 Lead  -     Exercise stress test; Future    Agatston coronary artery calcium score greater than 400 I discussed the calcium score with him he is got a calcified plaque does not mean he has severe obstruction he has no clear symptoms. At this level I will proceed with a stress test here in the office  -     Exercise stress test; Future    Mixed hyperlipidemia mixed hyperlipidemia we will continue Lipitor been on, add Vascepa which is the only one of the omega-3 to have a cardiac benefit    Controlled type 2 diabetes mellitus without complication, without long-term current use of insulin ([address] he has had diabetes on medicines he may want to consider Jardiance    Other orders  -     Icosapent Ethyl (VASCEPA) 1 g CAPS capsule; Take 2 capsules by mouth 2 times daily      [unfilled]      No follow-up provider specified. Thank you for allowing me to participate in this patient's care. Please call or contact me if there are any questions or concerns regarding the above.       Radha Trinh MD  09/09/22  12:18 PM        Consult note

## 2022-09-23 ENCOUNTER — OFFICE VISIT (OUTPATIENT)
Dept: CARDIOLOGY CLINIC | Age: 62
End: 2022-09-23
Payer: COMMERCIAL

## 2022-09-23 VITALS
SYSTOLIC BLOOD PRESSURE: 130 MMHG | BODY MASS INDEX: 28.93 KG/M2 | DIASTOLIC BLOOD PRESSURE: 70 MMHG | WEIGHT: 180 LBS | HEART RATE: 64 BPM | HEIGHT: 66 IN

## 2022-09-23 DIAGNOSIS — R94.39 ABNORMAL STRESS TEST: ICD-10-CM

## 2022-09-23 DIAGNOSIS — E78.2 MIXED HYPERLIPIDEMIA: Primary | ICD-10-CM

## 2022-09-23 DIAGNOSIS — I10 ESSENTIAL HYPERTENSION: ICD-10-CM

## 2022-09-23 DIAGNOSIS — R93.1 AGATSTON CORONARY ARTERY CALCIUM SCORE GREATER THAN 400: ICD-10-CM

## 2022-09-23 LAB
STRESS ANGINA INDEX: 1
STRESS BASELINE DIAS BP: 70 MMHG
STRESS BASELINE HR: 64 BPM
STRESS BASELINE ST DEPRESSION: 0 MM
STRESS BASELINE SYS BP: 130 MMHG
STRESS ESTIMATED WORKLOAD: 10 METS
STRESS EXERCISE DUR MIN: 9 MIN
STRESS PEAK DIAS BP: 70 MMHG
STRESS PEAK SYS BP: 176 MMHG
STRESS PERCENT HR ACHIEVED: 83 %
STRESS POST PEAK HR: 131 BPM
STRESS RATE PRESSURE PRODUCT: NORMAL BPM*MMHG
STRESS SR DUKE TREADMILL SCORE: -3
STRESS ST DEPRESSION: 1.5 MM
STRESS STAGE 1 BP: NORMAL MMHG
STRESS STAGE 1 DURATION: 3 MIN:SEC
STRESS STAGE 1 HR: 102 BPM
STRESS STAGE 2 DURATION: 3 MIN:SEC
STRESS STAGE 2 HR: 113 BPM
STRESS STAGE 3 BP: NORMAL MMHG
STRESS STAGE 3 DURATION: 3 MIN:SEC
STRESS STAGE 3 HR: 131 BPM
STRESS STAGE RECOVERY 1 BP: NORMAL MMHG
STRESS STAGE RECOVERY 1 DURATION: 2 MIN:SEC
STRESS STAGE RECOVERY 1 HR: 114 BPM
STRESS TARGET HR: 157 BPM

## 2022-09-23 PROCEDURE — G8419 CALC BMI OUT NRM PARAM NOF/U: HCPCS | Performed by: INTERNAL MEDICINE

## 2022-09-23 PROCEDURE — 99214 OFFICE O/P EST MOD 30 MIN: CPT | Performed by: INTERNAL MEDICINE

## 2022-09-23 PROCEDURE — G8428 CUR MEDS NOT DOCUMENT: HCPCS | Performed by: INTERNAL MEDICINE

## 2022-09-23 PROCEDURE — 1036F TOBACCO NON-USER: CPT | Performed by: INTERNAL MEDICINE

## 2022-09-23 PROCEDURE — 3017F COLORECTAL CA SCREEN DOC REV: CPT | Performed by: INTERNAL MEDICINE

## 2022-09-23 PROCEDURE — 93015 CV STRESS TEST SUPVJ I&R: CPT | Performed by: INTERNAL MEDICINE

## 2022-09-23 RX ORDER — METOPROLOL SUCCINATE 25 MG/1
25 TABLET, EXTENDED RELEASE ORAL DAILY
Qty: 30 TABLET | Refills: 3 | Status: SHIPPED | OUTPATIENT
Start: 2022-09-23

## 2022-09-23 NOTE — PROGRESS NOTES
60 gentleman comes back for follow-up stress test.  She had an abnormal calcium score over 400 occasional have some dyspnea on exertion and does have hyperlipidemia. Patient had a stress test today he went 9 minutes but only got a heart in 131 was 83%. Little vague discomfort but did not complain much. He had some ST depression starting little to stage II less than 1 mm but certainly millimeter to millimeter happened stage III and in recovery in the inferolateral leads consistent with ischemia. His [facility] treadmill score is -4-1/2 suggesting moderate risk. I discussed this with him today and I think the best option for him was to do a cardiac catheterization  since he started having symptoms and EKG changes heart rate only 113. We will start him on a beta-blocker therapy we will continue his cholesterol meds. I went through the risks and procedure of a heart catheterization from right radial approach. Discussed options of angioplasty and stenting and how that is done.   Discussed the risk of of and vascular injury bleeding stroke heart attack death etc. he will stay on his aspirin therapy

## 2022-09-28 DIAGNOSIS — I10 ESSENTIAL HYPERTENSION: ICD-10-CM

## 2022-09-28 LAB
ANION GAP SERPL CALC-SCNC: 3 MMOL/L (ref 4–13)
BUN SERPL-MCNC: 25 MG/DL (ref 8–23)
CALCIUM SERPL-MCNC: 9.6 MG/DL (ref 8.3–10.4)
CHLORIDE SERPL-SCNC: 108 MMOL/L (ref 101–110)
CO2 SERPL-SCNC: 28 MMOL/L (ref 21–32)
CREAT SERPL-MCNC: 1.2 MG/DL (ref 0.8–1.5)
ERYTHROCYTE [DISTWIDTH] IN BLOOD BY AUTOMATED COUNT: 13.9 % (ref 11.9–14.6)
GLUCOSE SERPL-MCNC: 179 MG/DL (ref 65–100)
HCT VFR BLD AUTO: 38.5 % (ref 41.1–50.3)
HGB BLD-MCNC: 12.7 G/DL (ref 13.6–17.2)
MCH RBC QN AUTO: 29.8 PG (ref 26.1–32.9)
MCHC RBC AUTO-ENTMCNC: 33 G/DL (ref 31.4–35)
MCV RBC AUTO: 90.4 FL (ref 79.6–97.8)
NRBC # BLD: 0 K/UL (ref 0–0.2)
PLATELET # BLD AUTO: 138 K/UL (ref 150–450)
PMV BLD AUTO: 14.6 FL (ref 9.4–12.3)
POTASSIUM SERPL-SCNC: 5.1 MMOL/L (ref 3.5–5.1)
RBC # BLD AUTO: 4.26 M/UL (ref 4.23–5.6)
SODIUM SERPL-SCNC: 139 MMOL/L (ref 136–145)
WBC # BLD AUTO: 5.9 K/UL (ref 4.3–11.1)

## 2022-09-29 NOTE — PROGRESS NOTES
Patient pre-assessment complete for [facility] scheduled for , arrival time 0600. Patient verified using . Patient instructed to bring a list of all home medications on the day of procedure. NPO status reinforced. Patient informed to take a full dose aspirin 325mg  or 81 mg x 4 on the day of procedure. Patient instructed to HOLD Vitamin B12, Vitamin D and Metformin. Instructed they can take all other medications excluding vitamins & supplements. Patient verbalizes understanding of all instructions & denies any questions at this time.

## 2022-09-30 ENCOUNTER — APPOINTMENT (OUTPATIENT)
Dept: NON INVASIVE DIAGNOSTICS | Age: 62
End: 2022-09-30
Attending: INTERNAL MEDICINE
Payer: COMMERCIAL

## 2022-09-30 ENCOUNTER — HOSPITAL ENCOUNTER (OUTPATIENT)
Age: 62
Setting detail: OUTPATIENT SURGERY
Discharge: HOME | End: 2022-09-30
Attending: INTERNAL MEDICINE | Admitting: INTERNAL MEDICINE
Payer: COMMERCIAL

## 2022-09-30 VITALS
HEART RATE: 67 BPM | OXYGEN SATURATION: 97 % | SYSTOLIC BLOOD PRESSURE: 119 MMHG | BODY MASS INDEX: 26.68 KG/M2 | HEIGHT: 66 IN | WEIGHT: 166 LBS | DIASTOLIC BLOOD PRESSURE: 76 MMHG | TEMPERATURE: 98 F

## 2022-09-30 DIAGNOSIS — I25.118 CORONARY ARTERY DISEASE OF NATIVE ARTERY OF NATIVE HEART WITH STABLE ANGINA PECTORIS (HCC): Primary | ICD-10-CM

## 2022-09-30 DIAGNOSIS — R94.39 ABNORMAL STRESS ECG: ICD-10-CM

## 2022-09-30 LAB
ACT BLD: 335 SECS (ref 70–128)
ECHO AO ASC DIAM: 3.2 CM
ECHO AO ASCENDING AORTA INDEX: 1.73 CM/M2
ECHO AO ROOT DIAM: 3.3 CM
ECHO AO ROOT INDEX: 1.78 CM/M2
ECHO AV AREA PEAK VELOCITY: 2.8 CM2
ECHO AV AREA VTI: 2.6 CM2
ECHO AV AREA/BSA PEAK VELOCITY: 1.5 CM2/M2
ECHO AV AREA/BSA VTI: 1.4 CM2/M2
ECHO AV MEAN GRADIENT: 2 MMHG
ECHO AV MEAN VELOCITY: 0.7 M/S
ECHO AV PEAK GRADIENT: 5 MMHG
ECHO AV PEAK VELOCITY: 1.1 M/S
ECHO AV VELOCITY RATIO: 0.82
ECHO AV VTI: 22.6 CM
ECHO BSA: 1.87 M2
ECHO BSA: 1.87 M2
ECHO EST RA PRESSURE: 3 MMHG
ECHO IVC PROX: 1.9 CM
ECHO LA AREA 2C: 17.7 CM2
ECHO LA AREA 4C: 12.9 CM2
ECHO LA DIAMETER INDEX: 1.62 CM/M2
ECHO LA DIAMETER: 3 CM
ECHO LA MAJOR AXIS: 5.1 CM
ECHO LA MINOR AXIS: 4.7 CM
ECHO LA TO AORTIC ROOT RATIO: 0.91
ECHO LA VOL BP: 40 ML (ref 18–58)
ECHO LA VOL/BSA BIPLANE: 22 ML/M2 (ref 16–34)
ECHO LV E' LATERAL VELOCITY: 14 CM/S
ECHO LV E' SEPTAL VELOCITY: 10 CM/S
ECHO LV EDV A2C: 104 ML
ECHO LV EDV A4C: 101 ML
ECHO LV EDV INDEX A4C: 55 ML/M2
ECHO LV EDV NDEX A2C: 56 ML/M2
ECHO LV EJECTION FRACTION A2C: 55 %
ECHO LV EJECTION FRACTION A4C: 53 %
ECHO LV EJECTION FRACTION BIPLANE: 54 % (ref 55–100)
ECHO LV ESV A2C: 47 ML
ECHO LV ESV A4C: 47 ML
ECHO LV ESV INDEX A2C: 25 ML/M2
ECHO LV ESV INDEX A4C: 25 ML/M2
ECHO LV FRACTIONAL SHORTENING: 26 % (ref 28–44)
ECHO LV INTERNAL DIMENSION DIASTOLE INDEX: 2.05 CM/M2
ECHO LV INTERNAL DIMENSION DIASTOLIC: 3.8 CM (ref 4.2–5.9)
ECHO LV INTERNAL DIMENSION SYSTOLIC INDEX: 1.51 CM/M2
ECHO LV INTERNAL DIMENSION SYSTOLIC: 2.8 CM
ECHO LV IVSD: 1.1 CM (ref 0.6–1)
ECHO LV MASS 2D: 125.8 G (ref 88–224)
ECHO LV MASS INDEX 2D: 68 G/M2 (ref 49–115)
ECHO LV POSTERIOR WALL DIASTOLIC: 1 CM (ref 0.6–1)
ECHO LV RELATIVE WALL THICKNESS RATIO: 0.53
ECHO LVOT AREA: 3.5 CM2
ECHO LVOT AV VTI INDEX: 0.76
ECHO LVOT DIAM: 2.1 CM
ECHO LVOT MEAN GRADIENT: 2 MMHG
ECHO LVOT PEAK GRADIENT: 3 MMHG
ECHO LVOT PEAK VELOCITY: 0.9 M/S
ECHO LVOT STROKE VOLUME INDEX: 32 ML/M2
ECHO LVOT SV: 59.2 ML
ECHO LVOT VTI: 17.1 CM
ECHO MV A VELOCITY: 0.58 M/S
ECHO MV E DECELERATION TIME (DT): 195 MS
ECHO MV E VELOCITY: 0.77 M/S
ECHO MV E/A RATIO: 1.33
ECHO MV E/E' LATERAL: 5.5
ECHO MV E/E' RATIO (AVERAGED): 6.6
ECHO MV E/E' SEPTAL: 7.7
ECHO PV ACCELERATION TIME (AT): 132 MS
ECHO PV MAX VELOCITY: 0.9 M/S
ECHO PV PEAK GRADIENT: 3 MMHG
ECHO RIGHT VENTRICULAR SYSTOLIC PRESSURE (RVSP): 26 MMHG
ECHO RV BASAL DIMENSION: 3.5 CM
ECHO RV TAPSE: 2 CM (ref 1.7–?)
ECHO TV REGURGITANT MAX VELOCITY: 2.42 M/S
ECHO TV REGURGITANT PEAK GRADIENT: 23 MMHG
EKG ATRIAL RATE: 69 BPM
EKG DIAGNOSIS: NORMAL
EKG P AXIS: 39 DEGREES
EKG P-R INTERVAL: 162 MS
EKG Q-T INTERVAL: 368 MS
EKG QRS DURATION: 96 MS
EKG QTC CALCULATION (BAZETT): 394 MS
EKG R AXIS: -31 DEGREES
EKG T AXIS: 22 DEGREES
EKG VENTRICULAR RATE: 69 BPM
GLUCOSE BLD STRIP.AUTO-MCNC: 184 MG/DL (ref 65–100)
LV EF: 53 %
LVEF MODALITY: ABNORMAL
SERVICE CMNT-IMP: ABNORMAL

## 2022-09-30 PROCEDURE — C1769 GUIDE WIRE: HCPCS | Performed by: INTERNAL MEDICINE

## 2022-09-30 PROCEDURE — 6360000002 HC RX W HCPCS: Performed by: INTERNAL MEDICINE

## 2022-09-30 PROCEDURE — 6360000004 HC RX CONTRAST MEDICATION: Performed by: INTERNAL MEDICINE

## 2022-09-30 PROCEDURE — 93458 L HRT ARTERY/VENTRICLE ANGIO: CPT

## 2022-09-30 PROCEDURE — 93458 L HRT ARTERY/VENTRICLE ANGIO: CPT | Performed by: INTERNAL MEDICINE

## 2022-09-30 PROCEDURE — 2500000003 HC RX 250 WO HCPCS: Performed by: INTERNAL MEDICINE

## 2022-09-30 PROCEDURE — 2709999900 HC NON-CHARGEABLE SUPPLY: Performed by: INTERNAL MEDICINE

## 2022-09-30 PROCEDURE — 2580000003 HC RX 258: Performed by: INTERNAL MEDICINE

## 2022-09-30 PROCEDURE — 85347 COAGULATION TIME ACTIVATED: CPT

## 2022-09-30 PROCEDURE — C1887 CATHETER, GUIDING: HCPCS | Performed by: INTERNAL MEDICINE

## 2022-09-30 PROCEDURE — 93571 IV DOP VEL&/PRESS C FLO 1ST: CPT

## 2022-09-30 PROCEDURE — 93005 ELECTROCARDIOGRAM TRACING: CPT | Performed by: INTERNAL MEDICINE

## 2022-09-30 PROCEDURE — 6370000000 HC RX 637 (ALT 250 FOR IP): Performed by: INTERNAL MEDICINE

## 2022-09-30 PROCEDURE — C1894 INTRO/SHEATH, NON-LASER: HCPCS | Performed by: INTERNAL MEDICINE

## 2022-09-30 PROCEDURE — 93306 TTE W/DOPPLER COMPLETE: CPT | Performed by: INTERNAL MEDICINE

## 2022-09-30 PROCEDURE — 93571 IV DOP VEL&/PRESS C FLO 1ST: CPT | Performed by: INTERNAL MEDICINE

## 2022-09-30 PROCEDURE — C8929 TTE W OR WO FOL WCON,DOPPLER: HCPCS

## 2022-09-30 PROCEDURE — 82962 GLUCOSE BLOOD TEST: CPT

## 2022-09-30 RX ORDER — LIDOCAINE HYDROCHLORIDE 10 MG/ML
INJECTION, SOLUTION INFILTRATION; PERINEURAL PRN
Status: DISCONTINUED | OUTPATIENT
Start: 2022-09-30 | End: 2022-09-30 | Stop reason: HOSPADM

## 2022-09-30 RX ORDER — ASPIRIN 81 MG/1
324 TABLET, CHEWABLE ORAL ONCE
Status: COMPLETED | OUTPATIENT
Start: 2022-09-30 | End: 2022-09-30

## 2022-09-30 RX ORDER — HEPARIN SODIUM 200 [USP'U]/100ML
INJECTION, SOLUTION INTRAVENOUS CONTINUOUS PRN
Status: COMPLETED | OUTPATIENT
Start: 2022-09-30 | End: 2022-09-30

## 2022-09-30 RX ORDER — NITROGLYCERIN 20 MG/100ML
INJECTION INTRAVENOUS PRN
Status: DISCONTINUED | OUTPATIENT
Start: 2022-09-30 | End: 2022-09-30 | Stop reason: HOSPADM

## 2022-09-30 RX ORDER — HEPARIN SODIUM 10000 [USP'U]/ML
INJECTION, SOLUTION INTRAVENOUS; SUBCUTANEOUS PRN
Status: DISCONTINUED | OUTPATIENT
Start: 2022-09-30 | End: 2022-09-30 | Stop reason: HOSPADM

## 2022-09-30 RX ADMIN — ASPIRIN 324 MG: 81 TABLET, CHEWABLE ORAL at 07:07

## 2022-09-30 RX ADMIN — PERFLUTREN 0.15 ML: 6.52 INJECTION, SUSPENSION INTRAVENOUS at 10:07

## 2022-09-30 ASSESSMENT — ENCOUNTER SYMPTOMS
ABDOMINAL PAIN: 0
HEARTBURN: 0
RIGHT EYE: 0
HEMOPTYSIS: 0
COLOR CHANGE: 0
BLURRED VISION: 0
DIARRHEA: 0
VOMITING: 0
SPUTUM PRODUCTION: 0
SORE THROAT: 0
NAUSEA: 0
COUGH: 0
BACK PAIN: 0
LEFT EYE: 0
ORTHOPNEA: 0
WHEEZING: 0
CONSTIPATION: 0
SHORTNESS OF BREATH: 0
HEMATEMESIS: 0

## 2022-09-30 NOTE — CONSULTS
Matheus Torres. MD Pj Walker. Fany Ferrell MD           CONSULT NOTE    Aiden Hendrickson.         9/26/2022 1960    REFERRING PHYSICIAN:  Dr. Louis Tsang:  The patient is a 60 y.o. male who seen in the office by Dr Krystle Liriano for FERRER and elevated calcium score. He had noted fatigue and severe dyspnea when doing strenuous yard work. He also reports one episode of near syncope. He underwent a treadmill stress test and was noted to have EKG changes consistent with ischemia. LHC was recommended. He underwent cardiac catheterization today that showed severe multivessel disease. Echocardiogram is pending. He does have FH of CAD. Risk factors include history of tobacco abuse, DM, HTN, dyslipidemia    No history of stroke, TIA, prior cardiac surgery, prior vascular surgery, anesthetic complication, lung disease, DVT or PE, GI bleeding       Past Medical History:   Diagnosis Date    Abnormal finding on EKG 08/05/2020    low voltage and incomplete RBBB    Bilateral foot pain     Chronic back pain     HTN (hypertension)     Hx stress fracture     left foot    Impingement syndrome of right shoulder 2020    Dr. Tammy Zhong    Insomnia     per pt work up with pulmonary- no sleep apnea    Periodontal disease due to type 2 diabetes mellitus ([facility] [address]     Dr. Sorenson Anorolly- dentist    Plantar fasciitis, bilateral     RLS (restless legs syndrome)        No past surgical history on file.     Family History   Problem Relation Age of Onset    Diabetes Sister     COPD Father     Stroke Mother        Social History     Socioeconomic History    Marital status:      Spouse name: Not on file    Number of children: Not on file    Years of education: Not on file    Highest education level: Not on file   Occupational History    Not on file   Tobacco Use    Smoking status: Former    Smokeless tobacco: Never   Substance and Sexual Activity    Alcohol use: Yes    Drug use: No    Sexual activity: Not on Surgical Progress Note    Author: Gildardo Phillips M.D. Date & Time created: 3/22/2020   8:39 AM     Interval Events:  Feels much better today.  Denies nausea or abdominal pain.  Lactic acidosis resolved with fluid.  CBC drawn, results pending.    Review of Systems   Constitutional: Negative for chills, fever and malaise/fatigue.   Gastrointestinal: Negative for abdominal pain, nausea and vomiting.     Hemodynamics:  Temp (24hrs), Av.2 °C (98.9 °F), Min:37 °C (98.6 °F), Max:37.3 °C (99.1 °F)  Temperature: 37.2 °C (98.9 °F)  Pulse  Av.4  Min: 58  Max: 94   Blood Pressure: 129/98     Respiratory:    Respiration: 16, Pulse Oximetry: 92 %           Neuro:  GCS = 15       Fluids:    Intake/Output Summary (Last 24 hours) at 3/22/2020 0839  Last data filed at 3/22/2020 0800  Gross per 24 hour   Intake 4000 ml   Output 1825 ml   Net 2175 ml     Weight: 98 kg (216 lb)  Current Diet Order   Procedures   • Diet Order Regular     Physical Exam  Constitutional:       General: He is not in acute distress.     Appearance: Normal appearance. He is normal weight. He is not ill-appearing or toxic-appearing.   HENT:      Mouth/Throat:      Mouth: Mucous membranes are dry.   Eyes:      General: No scleral icterus.     Pupils: Pupils are equal, round, and reactive to light.   Cardiovascular:      Rate and Rhythm: Normal rate and regular rhythm.   Pulmonary:      Effort: Pulmonary effort is normal.      Breath sounds: Normal breath sounds.   Abdominal:      General: Bowel sounds are normal. There is no distension.      Palpations: Abdomen is soft.      Tenderness: There is no abdominal tenderness. There is no guarding or rebound.   Skin:     General: Skin is warm and dry.   Neurological:      General: No focal deficit present.      Mental Status: He is alert and oriented to person, place, and time.   Psychiatric:         Behavior: Behavior normal.       Labs:  Recent Results (from the past 24 hour(s))   LACTIC ACID     Collection Time: 03/21/20 11:36 AM   Result Value Ref Range    Lactic Acid 2.4 (H) 0.5 - 2.0 mmol/L   CBC WITH DIFFERENTIAL    Collection Time: 03/21/20 11:36 AM   Result Value Ref Range    WBC 13.6 (H) 4.8 - 10.8 K/uL    RBC 5.24 4.70 - 6.10 M/uL    Hemoglobin 15.6 14.0 - 18.0 g/dL    Hematocrit 46.2 42.0 - 52.0 %    MCV 88.2 81.4 - 97.8 fL    MCH 29.8 27.0 - 33.0 pg    MCHC 33.8 33.7 - 35.3 g/dL    RDW 40.9 35.9 - 50.0 fL    Platelet Count 434 164 - 446 K/uL    MPV 8.9 (L) 9.0 - 12.9 fL    Neutrophils-Polys 60.00 44.00 - 72.00 %    Lymphocytes 27.00 22.00 - 41.00 %    Monocytes 6.10 0.00 - 13.40 %    Eosinophils 5.20 0.00 - 6.90 %    Basophils 1.70 0.00 - 1.80 %    Nucleated RBC 0.00 /100 WBC    Neutrophils (Absolute) 8.16 (H) 1.82 - 7.42 K/uL    Lymphs (Absolute) 3.67 1.00 - 4.80 K/uL    Monos (Absolute) 0.83 0.00 - 0.85 K/uL    Eos (Absolute) 0.71 (H) 0.00 - 0.51 K/uL    Baso (Absolute) 0.23 (H) 0.00 - 0.12 K/uL    NRBC (Absolute) 0.00 K/uL   LIPASE    Collection Time: 03/21/20 11:36 AM   Result Value Ref Range    Lipase 44 11 - 82 U/L   Comp Metabolic Panel    Collection Time: 03/21/20 11:36 AM   Result Value Ref Range    Sodium 140 135 - 145 mmol/L    Potassium 4.0 3.6 - 5.5 mmol/L    Chloride 104 96 - 112 mmol/L    Co2 22 20 - 33 mmol/L    Anion Gap 14.0 7.0 - 16.0    Glucose 111 (H) 65 - 99 mg/dL    Bun 12 8 - 22 mg/dL    Creatinine 0.90 0.50 - 1.40 mg/dL    Calcium 9.5 8.5 - 10.5 mg/dL    AST(SGOT) 22 12 - 45 U/L    ALT(SGPT) 22 2 - 50 U/L    Alkaline Phosphatase 61 30 - 99 U/L    Total Bilirubin 0.2 0.1 - 1.5 mg/dL    Albumin 3.6 3.2 - 4.9 g/dL    Total Protein 7.0 6.0 - 8.2 g/dL    Globulin 3.4 1.9 - 3.5 g/dL    A-G Ratio 1.1 g/dL   DIFFERENTIAL MANUAL    Collection Time: 03/21/20 11:36 AM   Result Value Ref Range    Manual Diff Status PERFORMED    PERIPHERAL SMEAR REVIEW    Collection Time: 03/21/20 11:36 AM   Result Value Ref Range    Peripheral Smear Review see below    PLATELET ESTIMATE    Collection  file   Other Topics Concern    Not on file   Social History Narrative    Not on file     Social Determinants of Health     Financial Resource Strain: Not on file   Food Insecurity: Not on file   Transportation Needs: Not on file   Physical Activity: Not on file   Stress: Not on file   Social Connections: Not on file   Intimate Partner Violence: Not on file   Housing Stability: Not on file       No Known Allergies    No current facility-administered medications on file prior to encounter. Current Outpatient Medications on File Prior to Encounter   Medication Sig Dispense Refill    Magnesium 100 MG CAPS Take by mouth      metoprolol succinate (TOPROL XL) 25 MG extended release tablet Take 1 tablet by mouth daily 30 tablet 3    Icosapent Ethyl (VASCEPA) 1 g CAPS capsule Take 2 capsules by mouth 2 times daily 120 capsule 5    atorvastatin (LIPITOR) 20 MG tablet TAKE 1 TABLET BY MOUTH EVERY DAY 90 tablet 2    olmesartan (BENICAR) 20 MG tablet TAKE 1 TABLET BY MOUTH EVERY DAY 90 tablet 1    pregabalin (LYRICA) 150 MG capsule Take 150 mg by mouth 2 times daily. vitamin B-12 (CYANOCOBALAMIN) 1000 MCG tablet Take 1,000 mcg by mouth daily      Cholecalciferol (VITAMIN D) 50 MCG (2000 UT) CAPS capsule Take by mouth      metFORMIN (GLUCOPHAGE) 500 MG tablet TAKE 1 TABLET BY MOUTH TWICE A DAY WITH MEALS 180 tablet 1    naproxen sodium (ANAPROX) 220 MG tablet Take 220 mg by mouth 2 times daily (with meals)      omeprazole (PRILOSEC OTC) 20 MG tablet Take 20 mg by mouth daily As needed         REVIEW OF SYSTEMS:  Review of Systems   Constitutional: Positive for malaise/fatigue. Negative for chills, fever, weight gain and weight loss. HENT:  Negative for ear pain, hearing loss, nosebleeds, sore throat and tinnitus. Eyes:  Negative for blurred vision, vision loss in left eye and vision loss in right eye. Cardiovascular:  Positive for dyspnea on exertion and near-syncope.  Negative for chest pain, leg swelling, Time: 20 11:36 AM   Result Value Ref Range    Plt Estimation Increased    MORPHOLOGY    Collection Time: 20 11:36 AM   Result Value Ref Range    RBC Morphology Normal    ESTIMATED GFR    Collection Time: 20 11:36 AM   Result Value Ref Range    GFR If African American >60 >60 mL/min/1.73 m 2    GFR If Non African American >60 >60 mL/min/1.73 m 2   TROPONIN    Collection Time: 20 11:36 AM   Result Value Ref Range    Troponin T <6 6 - 19 ng/L   LACTIC ACID    Collection Time: 20  4:00 PM   Result Value Ref Range    Lactic Acid 6.6 (HH) 0.5 - 2.0 mmol/L   EKG (NOW)    Collection Time: 20  4:09 PM   Result Value Ref Range    Report       [facility]    Test Date:  2020  Pt Name:    DARI JUAREZ                  Department: ER  MRN:        [id number]                      Room:       [facility]  Gender:     Male                         Technician: [id number]  :        1979                   Requested By:MAGALY GARCIA  Order #:    [id number]                    Reading MD: Magaly Garcia    Measurements  Intervals                                Axis  Rate:       54                           P:          17  MN:         148                          QRS:        80  QRSD:       100                          T:          63  QT:         460  QTc:        436    Interpretive Statements  SINUS BRADYCARDIA rate 54  Normal axis  Normal intervals  T waves flat AVL  ST ELEV, PROBABLE NORMAL EARLY REPOL PATTERN  No previous ECG available for comparison  Electronically Signed On 3- 16:28:52 PDT by Magaly Garcia     URINE DRUG SCREEN    Collection Time: 20  4:22 PM   Result Value Ref Range    Amphetamines Urine Negative Negative    Barbiturates Negative Negative    Benzodiazepines Negative Negative    Cocaine Metabolite Negative Negative    Methadone Negative Negative    Opiates Positive (A) Negative    Oxycodone Negative Negative    Phencyclidine -Pcp Negative  orthopnea, palpitations, paroxysmal nocturnal dyspnea and syncope. Respiratory:  Negative for cough, hemoptysis, shortness of breath, sputum production and wheezing. Endocrine: Negative for cold intolerance, heat intolerance and polydipsia. Hematologic/Lymphatic: Negative for bleeding problem. Does not bruise/bleed easily. Skin:  Negative for color change and rash. Musculoskeletal:  Negative for back pain, joint pain, joint swelling and myalgias. Gastrointestinal:  Negative for abdominal pain, constipation, diarrhea, dysphagia, heartburn, hematemesis, melena, nausea and vomiting. Genitourinary:  Negative for dysuria, frequency, hematuria and urgency. Neurological:  Negative for difficulty with concentration, dizziness, headaches, light-headedness, numbness, paresthesias, seizures, vertigo and weakness. Psychiatric/Behavioral:  Negative for altered mental status and depression.       Physical Exam  Vitals:    09/30/22 0656   BP: (!) 141/81   Pulse: 74   Temp: 98 °F (36.7 °C)   TempSrc: Oral   SpO2: 97%   Weight: 166 lb (75.3 kg)   Height: 5' 6\" (1.676 m)       Physical Exam:  General: Well Developed, Well Nourished, No Acute Distress  HEENT: Normocephalic, pupils equal and round, no scleral icterus  Neck: supple, no JVD  Chest wall: No deformity  Heart: S1S2 with RRR without murmurs or gallops  Lungs: Clear throughout auscultation bilaterally without adventitious sounds  Abd: soft, nontender, nondistended, with good bowel sounds, no pulsatile masses  Ext: warm, no edema, calves supple/nontender, pulses 2+ bilaterally  Skin: warm and dry, no rashes, cyanosis, jaundice, ecchymoses or evidence of skin breakdown  Psychiatric: Normal mood and affect  Neurologic: Alert and oriented X 3, no focal deficit noted    Labs:   Recent Labs     09/28/22  1237      K 5.1   BUN 25*   WBC 5.9   HGB 12.7*   HCT 38.5*   *       Lab Results   Component Value Date/Time    CHOL 120 07/08/2022 09:44 AM    HDL 29 07/08/2022 09:44 AM    VLDL 19 01/07/2022 10:45 AM       The patient has NYHA Class II symptoms. In this split/shared patient encounter, I personally reviewed the patient's past medical history, conducted a current medical history and review of systems, and conducted a medically appropriate physical exam. I reviewed the available labs, prior imaging, and current medication list.   The above accounted for 16 minutes of clinical time. Giana Poe. KRISTINE Shook    Assessment:     Active Hospital Problems    *Abnormal stress test  CAD  DM  Dyslipidemia  Hypertension  History of tobacco abuse     Plan:     Eduardo Thomas. is to see preoperative teaching film that thoroughly discusses procedure, risks, and possible complications. Risks, benefits, and alternatives were discussed to include, but not limited to:     Bleeding  Arrhythmia   Infection including mediastinitis  Myocardial infarction  Need for reoperation  Renal failure  Respiratory failure  Stroke  Potential death      I have recommended CABG surgery. He will be seen in the office next week for further discussion. In this split/shared patient encounter, I personally viewed the cardiac catheterization films. I reviewed the current and past patient medical history obtained by Ingrid De Jesus PA-C. I reviewed the available labs and current medication list. I discussed the cardiac catheterization results and educated the patient/family on treatment options. I ordered additional tests and procedures as indicated and coordinated care for the selected treatment plan, which accounted for 19 minutes of clinical time. Negative    Propoxyphene Negative Negative    Cannabinoid Metab Positive (A) Negative   URINALYSIS (UA)    Collection Time: 03/21/20  4:22 PM   Result Value Ref Range    Color Yellow     Character Clear     Ph 6.5 5.0 - 8.0    Glucose Negative Negative mg/dL    Ketones Negative Negative mg/dL    Protein Negative Negative mg/dL    Bilirubin Negative Negative    Urobilinogen, Urine 0.2 Negative    Nitrite Negative Negative    Leukocyte Esterase Negative Negative    Occult Blood Negative Negative    Micro Urine Req see below    REFRACTOMETER SG    Collection Time: 03/21/20  4:22 PM   Result Value Ref Range    Specific Mullin 1.047    LACTIC ACID    Collection Time: 03/21/20  6:21 PM   Result Value Ref Range    Lactic Acid 2.0 0.5 - 2.0 mmol/L   Basic Metabolic Panel (BPM)    Collection Time: 03/22/20  3:25 AM   Result Value Ref Range    Sodium 136 135 - 145 mmol/L    Potassium 3.8 3.6 - 5.5 mmol/L    Chloride 102 96 - 112 mmol/L    Co2 25 20 - 33 mmol/L    Glucose 91 65 - 99 mg/dL    Bun 7 (L) 8 - 22 mg/dL    Creatinine 0.73 0.50 - 1.40 mg/dL    Calcium 8.4 (L) 8.5 - 10.5 mg/dL    Anion Gap 9.0 7.0 - 16.0   LACTIC ACID    Collection Time: 03/22/20  3:25 AM   Result Value Ref Range    Lactic Acid 1.0 0.5 - 2.0 mmol/L   ESTIMATED GFR    Collection Time: 03/22/20  3:25 AM   Result Value Ref Range    GFR If African American >60 >60 mL/min/1.73 m 2    GFR If Non African American >60 >60 mL/min/1.73 m 2   LACTIC ACID    Collection Time: 03/22/20  7:44 AM   Result Value Ref Range    Lactic Acid 1.3 0.5 - 2.0 mmol/L     Medical Decision Making, by Problem:  Post op nausea and vomitting, resulting in lactic acidosis.  Resolved.      Plan:  Follow up CBC this morning, likely home today    Quality Measures:  Quality-Core Measures   Reviewed items::  Labs reviewed and Medications reviewed  Rachel catheter::  No Rachel  DVT prophylaxis pharmacological::  Enoxaparin (Lovenox)  Ulcer Prophylaxis::  Not indicated      Discussed patient  condition with RN and Patient

## 2022-09-30 NOTE — PROGRESS NOTES
Patient received to [facility] room # 11  Ambulatory from [facility]. Patient scheduled for [facility] today with Dr Vivi Patel. Procedure reviewed & questions answered, voiced good understanding consent obtained & placed on chart. All medications and medical history reviewed. Will prep patient per orders. Patient & family updated on plan of care. The patient has a fraility score of 3-MANAGING WELL, based on patient A&Ox3, patient able to ambulate to room without difficulty.

## 2022-09-30 NOTE — Clinical Note
Prepped: right groin and right radial. Site was marked, clipped and prepped. Prepped with: ChloraPrep. Wet prep solution dried at: 9/30/2022 8:12 AM. Patient was draped after wet prep solution dried.

## 2022-09-30 NOTE — PROGRESS NOTES
TRANSFER - OUT REPORT:    Verbal report given to [facility] RN on Harshad Purchase.  being transferred to [facility] for routine progression of patient care       Report consisted of patient's Situation, Background, Assessment and   Recommendations(SBAR). Information from the following report(s) MAR, Med Rec Status, and Cardiac Rhythm SR  was reviewed with the receiving nurse. Lines:   Peripheral IV 09/30/22 Left Antecubital (Active)       Peripheral IV 09/30/22 Right Hand (Active)        Opportunity for questions and clarification was provided.       Patient transported with:  Registered Nurse    [facility] Dr [facility] [place] consult  RRA - 12 ml air in band @ 0855  Heparin 5000 in [facility]

## 2022-09-30 NOTE — Clinical Note
TRANSFER - OUT REPORT:     Verbal report given to: Florence . Report consisted of patient's Situation, Background, Assessment and   Recommendations(SBAR). Opportunity for questions and clarification was provided. Patient transported with a Registered Nurse. Patient transported to: recovery.    [facility] Dr Haris Velez consult  RRA - 12 ml air in band @ 0855  Heparin 5000 in [facility]

## 2022-09-30 NOTE — PROGRESS NOTES
1130-patient ambulated to bath room with no difficulties. Right radial with no bleeding or hematoma. 1135- all discharge instructions explained to patient and family. Time allowed for questions no. No questions and concerns at this time. 1140- right radial checked. Site with no redness or bleeding. pt discharged to home via Wheelchair with family.

## 2022-09-30 NOTE — PROGRESS NOTES
TRANSFER - IN REPORT:    Verbal report received from Roseann Partida on [facility].  being received from [address] [place] [address] for routine progression of patient care      Report consisted of patient's Situation, Background, Assessment and   Recommendations(SBAR). Information from the following report(s) Nurse Handoff Report was reviewed with the receiving nurse. Opportunity for questions and clarification was provided. Assessment completed upon patient's arrival to unit and care assumed.

## 2022-09-30 NOTE — DISCHARGE INSTRUCTIONS
HEART CATHETERIZATION/ANGIOGRAPHY DISCHARGE INSTRUCTIONS    Check puncture site frequently for swelling or bleeding. If there is any bleeding, apply pressure over the area with a clean towel or washcloth and call 911. Notify your doctor for any redness, swelling, drainage, or oozing from the puncture site. Notify your doctor for any fever or chills. If the extremity becomes cold, numb, or painful call [facility] at [phone]. Activity should be limited for the next 48 hours. No heavy lifting, pushing, pulling  or strenuous activity for 48 hours. No heavy lifting (anything over 10 pounds) for 3 days. You may resume your usual diet. Drink more fluids than usual.  Have a responsible person drive you home and stay with you for at least 24 hours after your heart catheterization/angiography. No driving for 24 hours. You may remove bandage from your right arm in 24 hours. You may shower in 24 hours. No tub baths, hot tubs, or swimming for 1 week. Do not place any lotions, creams, powders, or ointments over puncture site for 1 week. You may place a clean band-aid over the puncture site each day for 5 days. Change daily. No Metformin until Sunday. Continue all other medications as prescribed. Sedation for a Medical Procedure: Care Instructions     You were given a sedative medication during your visit. While many of the effects will have worn   off before you leave; you may continue to feel some effects for several hours. Common side effects from sedation include:  Feeling sleepy. (Your doctors and nurses will make sure you are not too sleepy to go home.)  Nausea and vomiting. This usually does not last long. Feeling tired. How can you care for yourself at home? Activity    Don't do anything for 24 hours that requires attention to detail. It takes time for the medicine effects to completely wear off. Do not make important legal decisions for 24 hours.      Do not sign any legal documents for 24 hours. Do not drink alcohol today     For your safety, you should not drive or operate heavy machinery for the remainder of the day     Rest when you feel tired. Getting enough sleep will help you recover. Diet    You can eat your normal diet, unless your doctor gives you other instructions. If your stomach is upset, try clear liquids and bland, low-fat foods like plain toast or rice. Drink plenty of fluids (unless your doctor tells you not to). Don't drink alcohol for 24 hours. Medicines    Be safe with medicines. Read and follow all instructions on the label. If the doctor gave you a prescription medicine for pain, take it as prescribed. If you are not taking a prescription pain medicine, ask your doctor if you can take an over-the-counter medicine. If you think your pain medicine is making you sick to your stomach: Take your medicine after meals (unless your doctor has told you not to). Ask your doctor for a different pain medicine. I have read the above instructions and have had the opportunity to ask questions.       Patient: ________________________   Date: _____________    Witness: _______________________   Date: _____________

## 2022-10-04 ENCOUNTER — PREP FOR PROCEDURE (OUTPATIENT)
Dept: CARDIOTHORACIC SURGERY | Age: 62
End: 2022-10-04

## 2022-10-04 ENCOUNTER — OFFICE VISIT (OUTPATIENT)
Dept: CARDIOTHORACIC SURGERY | Age: 62
End: 2022-10-04
Payer: COMMERCIAL

## 2022-10-04 VITALS
HEIGHT: 66 IN | SYSTOLIC BLOOD PRESSURE: 153 MMHG | HEART RATE: 82 BPM | BODY MASS INDEX: 27.51 KG/M2 | DIASTOLIC BLOOD PRESSURE: 83 MMHG | WEIGHT: 171.2 LBS

## 2022-10-04 DIAGNOSIS — I25.10 CAD, MULTIPLE VESSEL: ICD-10-CM

## 2022-10-04 PROBLEM — R06.00 DYSPNEA: Status: ACTIVE | Noted: 2022-10-04

## 2022-10-04 PROCEDURE — G8484 FLU IMMUNIZE NO ADMIN: HCPCS | Performed by: THORACIC SURGERY (CARDIOTHORACIC VASCULAR SURGERY)

## 2022-10-04 PROCEDURE — 99215 OFFICE O/P EST HI 40 MIN: CPT | Performed by: THORACIC SURGERY (CARDIOTHORACIC VASCULAR SURGERY)

## 2022-10-04 PROCEDURE — G8427 DOCREV CUR MEDS BY ELIG CLIN: HCPCS | Performed by: THORACIC SURGERY (CARDIOTHORACIC VASCULAR SURGERY)

## 2022-10-04 PROCEDURE — G8419 CALC BMI OUT NRM PARAM NOF/U: HCPCS | Performed by: THORACIC SURGERY (CARDIOTHORACIC VASCULAR SURGERY)

## 2022-10-04 RX ORDER — CEPHALEXIN 500 MG/1
500 CAPSULE ORAL 4 TIMES DAILY
COMMUNITY
End: 2022-10-13

## 2022-10-04 NOTE — PROGRESS NOTES
[address] Shazia Schmidt. Roland Trinidad MD                CONSULT NOTE     Paco Viveros.                                                                                                                                                                      1960     REFERRING PHYSICIAN:  Dr. Tavia Lakhani:  The patient is a 60 y.o. male who seen in the office by Dr Gema Hopper for FERRER and elevated calcium score. He had noted fatigue and severe dyspnea when doing strenuous yard work. He also reports one episode of near syncope. He underwent a treadmill stress test and was noted to have EKG changes consistent with ischemia. LHC was recommended. He underwent cardiac catheterization today that showed severe multivessel disease. Echocardiogram is pending. He does have FH of CAD.       Risk factors include history of tobacco abuse, DM, HTN, dyslipidemia     No history of stroke, TIA, prior cardiac surgery, prior vascular surgery, anesthetic complication, lung disease, DVT or PE, GI bleeding              Past Medical History:   Diagnosis Date    Abnormal finding on EKG 08/05/2020     low voltage and incomplete RBBB    Bilateral foot pain      Chronic back pain      HTN (hypertension)      Hx stress fracture       left foot    Impingement syndrome of right shoulder 2020     Dr. Terry Bui    Insomnia       per pt work up with pulmonary- no sleep apnea    Periodontal disease due to type 2 diabetes mellitus ([facility] [address]       Dr. Dylan Redmond White- dentist    Plantar fasciitis, bilateral      RLS (restless legs syndrome)           No past surgical history on file. Family History   Problem Relation Age of Onset    Diabetes Sister      COPD Father      Stroke Mother           Social History            Socioeconomic History    Marital status:        Spouse name: Not on file    Number of children: Not on file    Years of education: Not on file    Highest education level: Not on file   Occupational History    Not on file   Tobacco Use    Smoking status: Former    Smokeless tobacco: Never   Substance and Sexual Activity    Alcohol use: Yes    Drug use: No    Sexual activity: Not on file   Other Topics Concern    Not on file   Social History Narrative    Not on file      Social Determinants of Health      Financial Resource Strain: Not on file   Food Insecurity: Not on file   Transportation Needs: Not on file   Physical Activity: Not on file   Stress: Not on file   Social Connections: Not on file   Intimate Partner Violence: Not on file   Housing Stability: Not on file         No Known Allergies     No current facility-administered medications on file prior to encounter. Current Outpatient Medications on File Prior to Encounter   Medication Sig Dispense Refill    Magnesium 100 MG CAPS Take by mouth        metoprolol succinate (TOPROL XL) 25 MG extended release tablet Take 1 tablet by mouth daily 30 tablet 3    Icosapent Ethyl (VASCEPA) 1 g CAPS capsule Take 2 capsules by mouth 2 times daily 120 capsule 5    atorvastatin (LIPITOR) 20 MG tablet TAKE 1 TABLET BY MOUTH EVERY DAY 90 tablet 2    olmesartan (BENICAR) 20 MG tablet TAKE 1 TABLET BY MOUTH EVERY DAY 90 tablet 1    pregabalin (LYRICA) 150 MG capsule Take 150 mg by mouth 2 times daily.         vitamin B-12 (CYANOCOBALAMIN) 1000 MCG tablet Take 1,000 mcg by mouth daily        Cholecalciferol (VITAMIN D) 50 MCG (2000 UT) CAPS capsule Take by mouth        metFORMIN (GLUCOPHAGE) 500 MG tablet TAKE 1 TABLET BY MOUTH TWICE A DAY WITH MEALS 180 tablet 1    naproxen sodium (ANAPROX) 220 MG tablet Take 220 mg by mouth 2 times daily (with meals)        omeprazole (PRILOSEC OTC) 20 MG tablet Take 20 mg by mouth daily As needed             REVIEW OF SYSTEMS:  Review of Systems   Constitutional: Positive for malaise/fatigue. Negative for chills, fever, weight gain and weight loss. HENT:  Negative for ear pain, hearing loss, nosebleeds, sore throat and tinnitus. Eyes:  Negative for blurred vision, vision loss in left eye and vision loss in right eye. Cardiovascular:  Positive for dyspnea on exertion and near-syncope. Negative for chest pain, leg swelling, orthopnea, palpitations, paroxysmal nocturnal dyspnea and syncope. Respiratory:  Negative for cough, hemoptysis, shortness of breath, sputum production and wheezing. Endocrine: Negative for cold intolerance, heat intolerance and polydipsia. Hematologic/Lymphatic: Negative for bleeding problem. Does not bruise/bleed easily. Skin:  Negative for color change and rash. Musculoskeletal:  Negative for back pain, joint pain, joint swelling and myalgias. Gastrointestinal:  Negative for abdominal pain, constipation, diarrhea, dysphagia, heartburn, hematemesis, melena, nausea and vomiting. Genitourinary:  Negative for dysuria, frequency, hematuria and urgency. Neurological:  Negative for difficulty with concentration, dizziness, headaches, light-headedness, numbness, paresthesias, seizures, vertigo and weakness. Psychiatric/Behavioral:  Negative for altered mental status and depression.        Physical Exam      Vitals:     09/30/22 0656   BP: (!) 141/81   Pulse: 74   Temp: 98 °F (36.7 °C)   TempSrc: Oral   SpO2: 97%   Weight: 166 lb (75.3 kg)   Height: 5' 6\" (1.676 m)         Physical Exam:  General: Well Developed, Well Nourished, No Acute Distress  HEENT: Normocephalic, pupils equal and round, no scleral icterus  Neck: supple, no JVD  Chest wall: No deformity  Heart: S1S2 with RRR without murmurs or gallops  Lungs: Clear throughout auscultation bilaterally without adventitious sounds  Abd: soft, nontender, nondistended, with good bowel sounds, no pulsatile masses  Ext: warm, no edema, calves supple/nontender, pulses 2+ bilaterally  Skin: warm and dry, no rashes, cyanosis, jaundice, ecchymoses or evidence of skin breakdown  Psychiatric: Normal mood and affect  Neurologic: Alert and oriented X 3, no focal deficit noted     Labs:       Recent Labs     09/28/22  1237      K 5.1   BUN 25*   WBC 5.9   HGB 12.7*   HCT 38.5*   *               Lab Results   Component Value Date/Time     CHOL 120 07/08/2022 09:44 AM     HDL 29 07/08/2022 09:44 AM     VLDL 19 01/07/2022 10:45 AM         The patient has NYHA Class II symptoms. In this split/shared patient encounter, I personally reviewed the patient's past medical history, conducted a current medical history and review of systems, and conducted a medically appropriate physical exam. I reviewed the available labs, prior imaging, and current medication list.   The above accounted for 16 minutes of clinical time. Loulou Holbrook. KRISTINE Shook     Assessment:      Active Hospital Problems    *Abnormal stress test  CAD  DM  Dyslipidemia  Hypertension  History of tobacco abuse      Plan:      Josue Nguyen. is to see preoperative teaching film that thoroughly discusses procedure, risks, and possible complications. Risks, benefits, and alternatives were discussed to include, but not limited to:      Bleeding  Arrhythmia   Infection including mediastinitis  Myocardial infarction  Need for reoperation  Renal failure  Respiratory failure  Stroke  Potential death        I have recommended CABG surgery. He will be seen in the office next week for further discussion. In this split/shared patient encounter, I personally viewed the cardiac catheterization films.  I reviewed the current and past patient medical history obtained by Reva Cisneros PA-C. I reviewed the available labs and current medication list. I discussed the cardiac catheterization results and educated the patient/family on treatment options. I ordered additional tests and procedures as indicated and coordinated care for the selected treatment plan, which accounted for 19 minutes of clinical time. Consults Info    Author Note Status Last Update User Last Update Date/Time            Chart Review Routing History    No routing history on file.     Care Timeline

## 2022-10-06 ENCOUNTER — PREP FOR PROCEDURE (OUTPATIENT)
Dept: CARDIOTHORACIC SURGERY | Age: 62
End: 2022-10-06

## 2022-10-06 DIAGNOSIS — I25.10 CAD, MULTIPLE VESSEL: Primary | ICD-10-CM

## 2022-10-06 DIAGNOSIS — Z01.818 PRE-OP TESTING: ICD-10-CM

## 2022-10-06 RX ORDER — SODIUM CHLORIDE 9 MG/ML
INJECTION, SOLUTION INTRAVENOUS PRN
Status: CANCELLED | OUTPATIENT
Start: 2022-10-06

## 2022-10-06 RX ORDER — AMIODARONE HYDROCHLORIDE 200 MG/1
600 TABLET ORAL ONCE
Status: CANCELLED | OUTPATIENT
Start: 2022-10-06 | End: 2022-10-06

## 2022-10-06 RX ORDER — SODIUM CHLORIDE 0.9 % (FLUSH) 0.9 %
5-40 SYRINGE (ML) INJECTION EVERY 12 HOURS SCHEDULED
Status: CANCELLED | OUTPATIENT
Start: 2022-10-06

## 2022-10-06 RX ORDER — SODIUM CHLORIDE 0.9 % (FLUSH) 0.9 %
5-40 SYRINGE (ML) INJECTION PRN
Status: CANCELLED | OUTPATIENT
Start: 2022-10-06

## 2022-10-12 NOTE — PROGRESS NOTES
Respiratory supervisor notified on PFT order for am    Imaging supervisor notified of Carotid order in am.

## 2022-10-13 ENCOUNTER — HOSPITAL ENCOUNTER (OUTPATIENT)
Dept: ULTRASOUND IMAGING | Age: 62
Discharge: HOME | End: 2022-10-16
Payer: COMMERCIAL

## 2022-10-13 ENCOUNTER — HOSPITAL ENCOUNTER (OUTPATIENT)
Dept: GENERAL RADIOLOGY | Age: 62
Discharge: HOME | End: 2022-10-16
Payer: COMMERCIAL

## 2022-10-13 ENCOUNTER — HOSPITAL ENCOUNTER (OUTPATIENT)
Dept: PREADMISSION TESTING | Age: 62
Discharge: HOME | End: 2022-10-16
Payer: COMMERCIAL

## 2022-10-13 VITALS
RESPIRATION RATE: 16 BRPM | DIASTOLIC BLOOD PRESSURE: 74 MMHG | BODY MASS INDEX: 26.95 KG/M2 | WEIGHT: 171.7 LBS | OXYGEN SATURATION: 97 % | HEIGHT: 67 IN | HEART RATE: 62 BPM | TEMPERATURE: 97.3 F | SYSTOLIC BLOOD PRESSURE: 150 MMHG

## 2022-10-13 DIAGNOSIS — Z01.818 PRE-OP TESTING: ICD-10-CM

## 2022-10-13 DIAGNOSIS — I25.10 CAD, MULTIPLE VESSEL: ICD-10-CM

## 2022-10-13 LAB
ALBUMIN SERPL-MCNC: 4.2 G/DL (ref 3.2–4.6)
ALBUMIN/GLOB SERPL: 1.3 {RATIO} (ref 0.4–1.6)
ALP SERPL-CCNC: 115 U/L (ref 50–136)
ALT SERPL-CCNC: 74 U/L (ref 12–65)
ANION GAP SERPL CALC-SCNC: 4 MMOL/L (ref 2–11)
APPEARANCE UR: CLEAR
APTT PPP: 29.6 SEC (ref 24.1–35.1)
AST SERPL-CCNC: 42 U/L (ref 15–37)
BACTERIA SPEC CULT: NORMAL
BILIRUB SERPL-MCNC: 0.6 MG/DL (ref 0.2–1.1)
BILIRUB UR QL: NEGATIVE
BUN SERPL-MCNC: 36 MG/DL (ref 8–23)
CALCIUM SERPL-MCNC: 9.6 MG/DL (ref 8.3–10.4)
CHLORIDE SERPL-SCNC: 108 MMOL/L (ref 101–110)
CO2 SERPL-SCNC: 29 MMOL/L (ref 21–32)
COLOR UR: NORMAL
CREAT SERPL-MCNC: 1.44 MG/DL (ref 0.8–1.5)
EST. AVERAGE GLUCOSE BLD GHB EST-MCNC: 146 MG/DL
GLOBULIN SER CALC-MCNC: 3.2 G/DL (ref 2.8–4.5)
GLUCOSE BLD STRIP.AUTO-MCNC: 146 MG/DL (ref 65–100)
GLUCOSE SERPL-MCNC: 157 MG/DL (ref 65–100)
GLUCOSE UR STRIP.AUTO-MCNC: NEGATIVE MG/DL
HBA1C MFR BLD: 6.7 % (ref 4.8–5.6)
HGB UR QL STRIP: NEGATIVE
INR PPP: 1.1
KETONES UR QL STRIP.AUTO: NEGATIVE MG/DL
LEUKOCYTE ESTERASE UR QL STRIP.AUTO: NEGATIVE
MAGNESIUM SERPL-MCNC: 2.5 MG/DL (ref 1.8–2.4)
NITRITE UR QL STRIP.AUTO: NEGATIVE
PH UR STRIP: 5.5 [PH] (ref 5–9)
POTASSIUM SERPL-SCNC: 5.3 MMOL/L (ref 3.5–5.1)
PROT SERPL-MCNC: 7.4 G/DL (ref 6.3–8.2)
PROT UR STRIP-MCNC: NEGATIVE MG/DL
PROTHROMBIN TIME: 14.1 SEC (ref 12.6–14.5)
SERVICE CMNT-IMP: ABNORMAL
SERVICE CMNT-IMP: NORMAL
SODIUM SERPL-SCNC: 141 MMOL/L (ref 133–143)
SP GR UR REFRACTOMETRY: 1.01 (ref 1–1.02)
UROBILINOGEN UR QL STRIP.AUTO: 0.2 EU/DL (ref 0.2–1)

## 2022-10-13 PROCEDURE — 71046 X-RAY EXAM CHEST 2 VIEWS: CPT

## 2022-10-13 PROCEDURE — 85730 THROMBOPLASTIN TIME PARTIAL: CPT

## 2022-10-13 PROCEDURE — 80053 COMPREHEN METABOLIC PANEL: CPT

## 2022-10-13 PROCEDURE — 85610 PROTHROMBIN TIME: CPT

## 2022-10-13 PROCEDURE — 81003 URINALYSIS AUTO W/O SCOPE: CPT

## 2022-10-13 PROCEDURE — 82962 GLUCOSE BLOOD TEST: CPT

## 2022-10-13 PROCEDURE — 93880 EXTRACRANIAL BILAT STUDY: CPT

## 2022-10-13 PROCEDURE — 83036 HEMOGLOBIN GLYCOSYLATED A1C: CPT

## 2022-10-13 PROCEDURE — 87641 MR-STAPH DNA AMP PROBE: CPT

## 2022-10-13 PROCEDURE — 83735 ASSAY OF MAGNESIUM: CPT

## 2022-10-13 PROCEDURE — 94010 BREATHING CAPACITY TEST: CPT

## 2022-10-13 RX ORDER — ACETAMINOPHEN 500 MG
500 TABLET ORAL EVERY 6 HOURS PRN
COMMUNITY

## 2022-10-13 RX ORDER — ASPIRIN 81 MG/1
81 TABLET ORAL DAILY
COMMUNITY

## 2022-10-13 RX ORDER — AMIODARONE HYDROCHLORIDE 200 MG/1
600 TABLET ORAL ONCE
Qty: 3 TABLET | Refills: 0 | Status: ON HOLD | OUTPATIENT
Start: 2022-10-13 | End: 2022-10-21 | Stop reason: HOSPADM

## 2022-10-13 NOTE — PERIOP NOTE
Labs routed to Dr Sebastián Chirinos and Shanna MCKEON and sent to anesthesiologist for review. Chart marked for charge nurse review.    Latest Reference Range & Units 10/13/22 08:25   Sodium 133 - 143 mmol/L 141   Potassium 3.5 - 5.1 mmol/L 5.3 (H)   Chloride 101 - 110 mmol/L 108   CO2 21 - 32 mmol/L 29   BUN,BUNPL 8 - 23 MG/DL 36 (H)   Creatinine 0.8 - 1.5 MG/DL 1.44   Anion Gap 2 - 11 mmol/L 4   Est, Glom Filt Rate >60 ml/min/1.73m2 55 (L)   Magnesium 1.8 - 2.4 mg/dL 2.5 (H)   Glucose, Random 65 - 100 mg/dL 157 (H)   CALCIUM, SERUM, [id number] 8.3 - 10.4 MG/DL 9.6   ALBUMIN/GLOBULIN RATIO 0.4 - 1.6   1.3   Total Protein 6.3 - 8.2 g/dL 7.4   Albumin 3.2 - 4.6 g/dL 4.2   Globulin 2.8 - 4.5 g/dL 3.2   Alk Phosphatase 50 - 136 U/L 115   ALT 12 - 65 U/L 74 (H)   AST 15 - 37 U/L 42 (H)   Bilirubin 0.2 - 1.1 MG/DL 0.6   Prothrombin Time 12.6 - 14.5 sec 14.1   INR -   1.1   PTT 24.1 - 35.1 SEC 29.6   Color, UA -   YELLOW/STRAW   Glucose, UA mg/dL Negative   Bilirubin, Urine NEG   Negative   Ketones, Urine NEG mg/dL Negative   Specific Gravity, UA 1.001 - 1.023   1.014   Blood, Urine NEG   Negative   Protein, UA NEG mg/dL Negative   Urobilinogen, Urine 0.2 - 1.0 EU/dL 0.2   Nitrite, Urine NEG   Negative   Leukocyte Esterase, Urine NEG   Negative   Appearance -   CLEAR   pH, Urine 5.0 - 9.0   5.5   MSSA/MRSA SCREEN BY PCR  Rpt   Special Requests -   NO SPECIAL REQUESTS   (H): Data is abnormally high  (L): Data is abnormally low  Rpt: View report in Results Review for more information

## 2022-10-13 NOTE — PERIOP NOTE
eye level. Put the mouthpiece in your mouth and close your lips tightly around it. Slowly breathe out (exhale) completely. Breathe in (inhale) slowly through your mouth as deeply as you can. As you take the breath, you will see the piston rise inside the large column. While the piston rises, the indicator on the right should move upwards. It should stay in between the 2 arrows (see Figure 1). Try to get the piston as high as you can, while keeping the indicator between the arrows. If the indicator doesn't stay between the arrows, you're breathing either too fast or too slow. When you get it as high as you can, hold your breath for 10 seconds, or as long as possible. While you're holding your breath, the piston will slowly fall to the base of the spirometer. Once the piston reaches the bottom of the spirometer, breathe out slowly through your mouth. Rest for a few seconds. Repeat 10 times. Try to get the piston to the same level with each breath. After each set of 10 breaths, try to cough as coughing will help loosen or clear any mucus in your lungs. Put the marker at the level the piston reached on your incentive spirometer. This will be your goal next time. Repeat these steps every hour that you're awake.   Cover the mouthpiece of the incentive spirometer when you aren't using it

## 2022-10-13 NOTE — PERIOP NOTE
Patient confirms name and . Order to obtain consent  found in EHR and  matches case posting. Labs/Orders per Surgeon: completed. POC Glucose: 146  MRSA swab, and clean catch urine obtained and sent to lab. Patient ID band applied, and patient directed to radiology with orders in hand for CXR and Carotid Duplex NOW. Patient instructed to return on morning prior to surgery (10/16/22)  to [address] [place] [address] via ED entrance for blood bank labs. Orders released and patient has copy of order in hand. Pt verbalizes understanding not to remove blood bank wrist band that will be applied until discharged from the hospital after surgery. Medication list updated today. Verbal medication list provided by the patient today. Pt verbalizes understanding to take Amiodarone 600 mg after 4 pm the night before heart surgery. Ananya MCKEON  confirmed Rx sent to patient's preferred pharmacy. Patient viewed preoperative heart teaching video. Pre op instructions and education sheets given and reviewed with patient:  Heart instructions, blood transfusion education, hand hygiene education, smoking cessation education if applicable, pain management education. Patient verbalizes understanding of all instructions. Pt instructed on importance of handwashing for infection prevention. Pt verbalizes understanding to shower nightly with antibacterial soap & antiseptic wash provided at pre assessment on the night prior to and morning of surgery. Instructed to turn water off and wash with HIBICLENS/ Antiseptic Wash from chin to toes avoiding genitalia, then rinse after 1 minute. Pt verbalizes understanding to repeat this the morning of surgery. PT. INSTRUCTED TO CALL THE FOLLOWING NUMBERS IF ANY SAFETY CONCERNS BEFORE, DURING, OR AFTER HOSPITAL ENCOUNTER: PT. SAFETY LINE - [phone] OR PT. RELATIONS - [phone].     You will received a call from the pre-op nurse by 5 pm on the business day prior to the scheduled procedure. If you have not spoken with a nurse, please check your voicemail. If you have not received an arrival time by 5 pm, please call [phone]. You may be required to pay a deductible or co-pay on the day of your procedure. You can pre-pay by calling [phone] if your surgery is at the [facility] or [phone] if your surgery is at the [facility].

## 2022-10-14 NOTE — PERIOP NOTE
Directly informed patient and or family member of pre op arrival time 0 on 10/17. All questions answered. Pre op instructions reviewed. Left contact information for any additional questions or needs.

## 2022-10-16 ENCOUNTER — HOSPITAL ENCOUNTER (EMERGENCY)
Age: 62
Discharge: HOME | DRG: 236 | End: 2022-10-16
Attending: EMERGENCY MEDICINE
Payer: COMMERCIAL

## 2022-10-16 ENCOUNTER — ANESTHESIA EVENT (OUTPATIENT)
Dept: SURGERY | Age: 62
DRG: 236 | End: 2022-10-16
Payer: COMMERCIAL

## 2022-10-16 VITALS
SYSTOLIC BLOOD PRESSURE: 118 MMHG | HEART RATE: 57 BPM | DIASTOLIC BLOOD PRESSURE: 75 MMHG | BODY MASS INDEX: 26.06 KG/M2 | WEIGHT: 166 LBS | HEIGHT: 67 IN | RESPIRATION RATE: 16 BRPM | OXYGEN SATURATION: 98 % | TEMPERATURE: 98.5 F

## 2022-10-16 DIAGNOSIS — I25.10 CORONARY ARTERY DISEASE INVOLVING NATIVE HEART WITHOUT ANGINA PECTORIS, UNSPECIFIED VESSEL OR LESION TYPE: Primary | ICD-10-CM

## 2022-10-16 LAB
ALBUMIN SERPL-MCNC: 3.9 G/DL (ref 3.2–4.6)
ALBUMIN/GLOB SERPL: 1.1 {RATIO} (ref 0.4–1.6)
ALP SERPL-CCNC: 100 U/L (ref 50–136)
ALT SERPL-CCNC: 77 U/L (ref 12–65)
ANION GAP SERPL CALC-SCNC: 3 MMOL/L (ref 2–11)
AST SERPL-CCNC: 45 U/L (ref 15–37)
BASOPHILS # BLD: 0.1 K/UL (ref 0–0.2)
BASOPHILS NFR BLD: 1 % (ref 0–2)
BILIRUB SERPL-MCNC: 0.6 MG/DL (ref 0.2–1.1)
BUN SERPL-MCNC: 24 MG/DL (ref 8–23)
CALCIUM SERPL-MCNC: 8.5 MG/DL (ref 8.3–10.4)
CHLORIDE SERPL-SCNC: 113 MMOL/L (ref 101–110)
CO2 SERPL-SCNC: 23 MMOL/L (ref 21–32)
CREAT SERPL-MCNC: 1.4 MG/DL (ref 0.8–1.5)
DIFFERENTIAL METHOD BLD: ABNORMAL
EOSINOPHIL # BLD: 0.2 K/UL (ref 0–0.8)
EOSINOPHIL NFR BLD: 2 % (ref 0.5–7.8)
ERYTHROCYTE [DISTWIDTH] IN BLOOD BY AUTOMATED COUNT: 14.1 % (ref 11.9–14.6)
GLOBULIN SER CALC-MCNC: 3.4 G/DL (ref 2.8–4.5)
GLUCOSE SERPL-MCNC: 105 MG/DL (ref 65–100)
HCT VFR BLD AUTO: 39.6 % (ref 41.1–50.3)
HGB BLD-MCNC: 13.4 G/DL (ref 13.6–17.2)
HISTORY CHECK: NORMAL
IMM GRANULOCYTES # BLD AUTO: 0.1 K/UL (ref 0–0.5)
IMM GRANULOCYTES NFR BLD AUTO: 1 % (ref 0–5)
LYMPHOCYTES # BLD: 2.9 K/UL (ref 0.5–4.6)
LYMPHOCYTES NFR BLD: 33 % (ref 13–44)
MCH RBC QN AUTO: 30.3 PG (ref 26.1–32.9)
MCHC RBC AUTO-ENTMCNC: 33.8 G/DL (ref 31.4–35)
MCV RBC AUTO: 89.6 FL (ref 82–102)
MONOCYTES # BLD: 1 K/UL (ref 0.1–1.3)
MONOCYTES NFR BLD: 12 % (ref 4–12)
NEUTS SEG # BLD: 4.4 K/UL (ref 1.7–8.2)
NEUTS SEG NFR BLD: 51 % (ref 43–78)
NRBC # BLD: 0 K/UL (ref 0–0.2)
PLATELET # BLD AUTO: 169 K/UL (ref 150–450)
PLATELET COMMENT: ADEQUATE
PMV BLD AUTO: 13.1 FL (ref 9.4–12.3)
POTASSIUM SERPL-SCNC: 4.6 MMOL/L (ref 3.5–5.1)
PROT SERPL-MCNC: 7.3 G/DL (ref 6.3–8.2)
RBC # BLD AUTO: 4.42 M/UL (ref 4.23–5.6)
RBC MORPH BLD: ABNORMAL
SODIUM SERPL-SCNC: 139 MMOL/L (ref 133–143)
WBC # BLD AUTO: 8.7 K/UL (ref 4.3–11.1)
WBC MORPH BLD: ABNORMAL

## 2022-10-16 PROCEDURE — 80053 COMPREHEN METABOLIC PANEL: CPT

## 2022-10-16 PROCEDURE — 86901 BLOOD TYPING SEROLOGIC RH(D): CPT

## 2022-10-16 PROCEDURE — 86920 COMPATIBILITY TEST SPIN: CPT

## 2022-10-16 PROCEDURE — 85025 COMPLETE CBC W/AUTO DIFF WBC: CPT

## 2022-10-16 PROCEDURE — 36415 COLL VENOUS BLD VENIPUNCTURE: CPT

## 2022-10-16 ASSESSMENT — PAIN - FUNCTIONAL ASSESSMENT: PAIN_FUNCTIONAL_ASSESSMENT: NONE - DENIES PAIN

## 2022-10-16 NOTE — ED TRIAGE NOTES
Pt presents for lab work and FFP and RBC infusion prior to CABG scheduled in the AM. Sent by Dr. Zion Michel for bloodwork prior to surgery. Pt denies any current symptoms.

## 2022-10-17 ENCOUNTER — ANESTHESIA (OUTPATIENT)
Dept: SURGERY | Age: 62
DRG: 236 | End: 2022-10-17
Payer: COMMERCIAL

## 2022-10-17 ENCOUNTER — APPOINTMENT (OUTPATIENT)
Dept: GENERAL RADIOLOGY | Age: 62
DRG: 236 | End: 2022-10-17
Attending: THORACIC SURGERY (CARDIOTHORACIC VASCULAR SURGERY)
Payer: COMMERCIAL

## 2022-10-17 ENCOUNTER — HOSPITAL ENCOUNTER (INPATIENT)
Age: 62
LOS: 4 days | Discharge: HOME HEALTH | DRG: 236 | End: 2022-10-21
Attending: THORACIC SURGERY (CARDIOTHORACIC VASCULAR SURGERY) | Admitting: THORACIC SURGERY (CARDIOTHORACIC VASCULAR SURGERY)
Payer: COMMERCIAL

## 2022-10-17 DIAGNOSIS — Z95.1 S/P CABG X 4: Primary | ICD-10-CM

## 2022-10-17 PROBLEM — I10 ESSENTIAL HYPERTENSION: Status: ACTIVE | Noted: 2020-08-05

## 2022-10-17 PROBLEM — K21.9 GASTROESOPHAGEAL REFLUX DISEASE WITHOUT ESOPHAGITIS: Status: ACTIVE | Noted: 2021-09-03

## 2022-10-17 PROBLEM — Z99.11 ENCOUNTER FOR WEANING FROM VENTILATOR (HCC): Status: ACTIVE | Noted: 2022-10-17

## 2022-10-17 PROBLEM — E11.9 CONTROLLED TYPE 2 DIABETES MELLITUS WITHOUT COMPLICATION, WITHOUT LONG-TERM CURRENT USE OF INSULIN (HCC): Status: ACTIVE | Noted: 2020-12-18

## 2022-10-17 PROBLEM — I25.10 CAD IN NATIVE ARTERY: Status: ACTIVE | Noted: 2022-10-17

## 2022-10-17 LAB
ACT AVERAGE - AAVG: 124 SEC
ACT AVERAGE - AAVG: 599 SEC
ACT AVERAGE - AAVG: 663 SEC
ACT AVERAGE - AAVG: 668 SEC
ANION GAP SERPL CALC-SCNC: 5 MMOL/L (ref 2–11)
APTT PPP: 31.6 SEC (ref 24.1–35.1)
BASE DEFICIT BLD-SCNC: 1.8 MMOL/L
BASE DEFICIT BLD-SCNC: 2.1 MMOL/L
BASE DEFICIT BLD-SCNC: 2.1 MMOL/L
BASE DEFICIT BLD-SCNC: 2.3 MMOL/L
BASE DEFICIT BLD-SCNC: 2.8 MMOL/L
BASE DEFICIT BLD-SCNC: 2.9 MMOL/L
BASE DEFICIT BLD-SCNC: 3.7 MMOL/L
BASELINE ACT: 156 SEC
BASELINE ACT: 156 SEC
BUN SERPL-MCNC: 19 MG/DL (ref 8–23)
CA-I BLD-MCNC: 1.09 MMOL/L (ref 1.12–1.32)
CA-I BLD-MCNC: 1.1 MMOL/L (ref 1.12–1.32)
CA-I BLD-MCNC: 1.1 MMOL/L (ref 1.12–1.32)
CA-I BLD-MCNC: 1.12 MMOL/L (ref 1.12–1.32)
CA-I BLD-MCNC: 1.15 MMOL/L (ref 1.12–1.32)
CA-I BLD-MCNC: 1.2 MMOL/L (ref 1.12–1.32)
CA-I BLD-MCNC: 1.3 MMOL/L (ref 1.12–1.32)
CALCIUM SERPL-MCNC: 8.2 MG/DL (ref 8.3–10.4)
CHLORIDE SERPL-SCNC: 118 MMOL/L (ref 101–110)
CO2 BLD-SCNC: 22 MMOL/L (ref 13–23)
CO2 BLD-SCNC: 23 MMOL/L (ref 13–23)
CO2 BLD-SCNC: 24 MMOL/L (ref 13–23)
CO2 SERPL-SCNC: 21 MMOL/L (ref 21–32)
CREAT SERPL-MCNC: 1.2 MG/DL (ref 0.8–1.5)
EKG ATRIAL RATE: 59 BPM
EKG DIAGNOSIS: NORMAL
EKG P AXIS: 62 DEGREES
EKG P-R INTERVAL: 176 MS
EKG Q-T INTERVAL: 462 MS
EKG QRS DURATION: 106 MS
EKG QTC CALCULATION (BAZETT): 457 MS
EKG R AXIS: -59 DEGREES
EKG T AXIS: 49 DEGREES
EKG VENTRICULAR RATE: 59 BPM
ERYTHROCYTE [DISTWIDTH] IN BLOOD BY AUTOMATED COUNT: 13.9 % (ref 11.9–14.6)
ERYTHROCYTE [DISTWIDTH] IN BLOOD BY AUTOMATED COUNT: 14.1 % (ref 11.9–14.6)
FIBRINOGEN PPP-MCNC: 201 MG/DL (ref 190–501)
GLUCOSE BLD STRIP.AUTO-MCNC: 105 MG/DL (ref 65–100)
GLUCOSE BLD STRIP.AUTO-MCNC: 106 MG/DL (ref 65–100)
GLUCOSE BLD STRIP.AUTO-MCNC: 108 MG/DL (ref 65–100)
GLUCOSE BLD STRIP.AUTO-MCNC: 114 MG/DL (ref 65–100)
GLUCOSE BLD STRIP.AUTO-MCNC: 116 MG/DL (ref 65–100)
GLUCOSE BLD STRIP.AUTO-MCNC: 118 MG/DL (ref 65–100)
GLUCOSE BLD STRIP.AUTO-MCNC: 120 MG/DL (ref 65–100)
GLUCOSE BLD STRIP.AUTO-MCNC: 122 MG/DL (ref 65–100)
GLUCOSE BLD STRIP.AUTO-MCNC: 123 MG/DL (ref 65–100)
GLUCOSE BLD STRIP.AUTO-MCNC: 124 MG/DL (ref 65–100)
GLUCOSE BLD STRIP.AUTO-MCNC: 136 MG/DL (ref 65–100)
GLUCOSE BLD STRIP.AUTO-MCNC: 136 MG/DL (ref 65–100)
GLUCOSE BLD STRIP.AUTO-MCNC: 139 MG/DL (ref 65–100)
GLUCOSE BLD STRIP.AUTO-MCNC: 139 MG/DL (ref 65–100)
GLUCOSE BLD STRIP.AUTO-MCNC: 142 MG/DL (ref 65–100)
GLUCOSE BLD STRIP.AUTO-MCNC: 144 MG/DL (ref 65–100)
GLUCOSE BLD STRIP.AUTO-MCNC: 155 MG/DL (ref 65–100)
GLUCOSE BLD STRIP.AUTO-MCNC: 88 MG/DL (ref 65–100)
GLUCOSE BLD STRIP.AUTO-MCNC: 95 MG/DL (ref 65–100)
GLUCOSE SERPL-MCNC: 123 MG/DL (ref 65–100)
HCO3 BLD-SCNC: 21.7 MMOL/L (ref 22–26)
HCO3 BLD-SCNC: 22.4 MMOL/L (ref 22–26)
HCO3 BLD-SCNC: 22.6 MMOL/L (ref 22–26)
HCO3 BLD-SCNC: 23 MMOL/L (ref 22–26)
HCO3 BLD-SCNC: 23.3 MMOL/L (ref 22–26)
HCO3 BLD-SCNC: 23.5 MMOL/L (ref 22–26)
HCO3 BLD-SCNC: 23.7 MMOL/L (ref 22–26)
HCT VFR BLD AUTO: 28.1 % (ref 41.1–50.3)
HCT VFR BLD AUTO: 28.8 % (ref 41.1–50.3)
HCT VFR BLD AUTO: 34.6 % (ref 41.1–50.3)
HEPARIN BOLUS-PATIENT: NORMAL UNITS
HEPARIN BOLUS-PATIENT: NORMAL UNITS
HEPARIN BOLUS-PUMP: 0 UNITS
HEPARIN BOLUS-PUMP: 0 UNITS
HEPARIN BOLUS-TOTAL: NORMAL UNITS
HEPARIN BOLUS-TOTAL: NORMAL UNITS
HEPARIN REQUIRED-PATIENT: 0
HEPARIN REQUIRED-PATIENT: 0
HEPARIN REQUIRED-TOTAL: 0 UNITS
HEPARIN REQUIRED-TOTAL: 0 UNITS
HEPARIN TEST CONCENTRATE: 2.5 MG/KG
HGB BLD-MCNC: 11.6 G/DL (ref 13.6–17.2)
HGB BLD-MCNC: 9.6 G/DL (ref 13.6–17.2)
HGB BLD-MCNC: 9.8 G/DL (ref 13.6–17.2)
INR PPP: 1.4
MAGNESIUM SERPL-MCNC: 2.5 MG/DL (ref 1.8–2.4)
MAGNESIUM SERPL-MCNC: 2.5 MG/DL (ref 1.8–2.4)
MAGNESIUM SERPL-MCNC: 3.2 MG/DL (ref 1.8–2.4)
MCH RBC QN AUTO: 29.8 PG (ref 26.1–32.9)
MCH RBC QN AUTO: 30.3 PG (ref 26.1–32.9)
MCHC RBC AUTO-ENTMCNC: 33.5 G/DL (ref 31.4–35)
MCHC RBC AUTO-ENTMCNC: 34 G/DL (ref 31.4–35)
MCV RBC AUTO: 88.9 FL (ref 82–102)
MCV RBC AUTO: 89.2 FL (ref 82–102)
NRBC # BLD: 0 K/UL (ref 0–0.2)
NRBC # BLD: 0 K/UL (ref 0–0.2)
PCO2 BLD: 39.8 MMHG (ref 35–45)
PCO2 BLD: 40 MMHG (ref 35–45)
PCO2 BLD: 40.4 MMHG (ref 35–45)
PCO2 BLD: 41.1 MMHG (ref 35–45)
PCO2 BLD: 41.4 MMHG (ref 35–45)
PCO2 BLD: 42.4 MMHG (ref 35–45)
PCO2 BLD: 42.4 MMHG (ref 35–45)
PH BLD: 7.34 [PH] (ref 7.35–7.45)
PH BLD: 7.35 [PH] (ref 7.35–7.45)
PH BLD: 7.35 [PH] (ref 7.35–7.45)
PH BLD: 7.36 [PH] (ref 7.35–7.45)
PLATELET # BLD AUTO: 104 K/UL (ref 150–450)
PLATELET # BLD AUTO: 88 K/UL (ref 150–450)
PMV BLD AUTO: 13 FL (ref 9.4–12.3)
PMV BLD AUTO: 13.3 FL (ref 9.4–12.3)
PO2 BLD: 269 MMHG (ref 75–100)
PO2 BLD: 289 MMHG (ref 75–100)
PO2 BLD: 298 MMHG (ref 75–100)
PO2 BLD: 329 MMHG (ref 75–100)
PO2 BLD: 333 MMHG (ref 75–100)
PO2 BLD: 351 MMHG (ref 75–100)
PO2 BLD: 381 MMHG (ref 75–100)
POTASSIUM BLD-SCNC: 3.8 MMOL/L (ref 3.5–5.1)
POTASSIUM BLD-SCNC: 4.5 MMOL/L (ref 3.5–5.1)
POTASSIUM BLD-SCNC: 4.6 MMOL/L (ref 3.5–5.1)
POTASSIUM BLD-SCNC: 4.7 MMOL/L (ref 3.5–5.1)
POTASSIUM BLD-SCNC: 4.8 MMOL/L (ref 3.5–5.1)
POTASSIUM BLD-SCNC: 5.1 MMOL/L (ref 3.5–5.1)
POTASSIUM BLD-SCNC: 5.4 MMOL/L (ref 3.5–5.1)
POTASSIUM SERPL-SCNC: 3.9 MMOL/L (ref 3.5–5.1)
POTASSIUM SERPL-SCNC: 4.3 MMOL/L (ref 3.5–5.1)
POTASSIUM SERPL-SCNC: 4.4 MMOL/L (ref 3.5–5.1)
PROJECTED HEPARIN CONCENTATION: 2.1 MG/KG
PROJECTED HEPARIN CONCENTATION: 2.1 MG/KG
PROTAMINE DOSE-PUMP: 34 MG
PROTAMINE DOSE-PUMP: 34 MG
PROTAMINE DOSE-TOTAL: 216 MG
PROTAMINE DOSE-TOTAL: 216 MG
PROTHROMBIN TIME: 17.3 SEC (ref 12.6–14.5)
RBC # BLD AUTO: 3.23 M/UL (ref 4.23–5.6)
RBC # BLD AUTO: 3.89 M/UL (ref 4.23–5.6)
SAO2 % BLD: 100 %
SERVICE CMNT-IMP: ABNORMAL
SERVICE CMNT-IMP: NORMAL
SERVICE CMNT-IMP: NORMAL
SLOPE: 114
SLOPE: 114
SODIUM BLD-SCNC: 140 MMOL/L (ref 136–145)
SODIUM BLD-SCNC: 140 MMOL/L (ref 136–145)
SODIUM BLD-SCNC: 141 MMOL/L (ref 136–145)
SODIUM BLD-SCNC: 141 MMOL/L (ref 136–145)
SODIUM BLD-SCNC: 142 MMOL/L (ref 136–145)
SODIUM BLD-SCNC: 142 MMOL/L (ref 136–145)
SODIUM BLD-SCNC: 146 MMOL/L (ref 136–145)
SODIUM SERPL-SCNC: 144 MMOL/L (ref 133–143)
SPECIMEN SITE: ABNORMAL
WBC # BLD AUTO: 11.7 K/UL (ref 4.3–11.1)
WBC # BLD AUTO: 17.4 K/UL (ref 4.3–11.1)

## 2022-10-17 PROCEDURE — 6360000002 HC RX W HCPCS: Performed by: PHYSICIAN ASSISTANT

## 2022-10-17 PROCEDURE — 99223 1ST HOSP IP/OBS HIGH 75: CPT | Performed by: INTERNAL MEDICINE

## 2022-10-17 PROCEDURE — 3600000008 HC SURGERY OHS BASE: Performed by: THORACIC SURGERY (CARDIOTHORACIC VASCULAR SURGERY)

## 2022-10-17 PROCEDURE — 83735 ASSAY OF MAGNESIUM: CPT

## 2022-10-17 PROCEDURE — 6360000002 HC RX W HCPCS: Performed by: THORACIC SURGERY (CARDIOTHORACIC VASCULAR SURGERY)

## 2022-10-17 PROCEDURE — 85530 HEPARIN-PROTAMINE TOLERANCE: CPT

## 2022-10-17 PROCEDURE — 02100Z9 BYPASS CORONARY ARTERY, ONE ARTERY FROM LEFT INTERNAL MAMMARY, OPEN APPROACH: ICD-10-PCS | Performed by: THORACIC SURGERY (CARDIOTHORACIC VASCULAR SURGERY)

## 2022-10-17 PROCEDURE — P9047 ALBUMIN (HUMAN), 25%, 50ML: HCPCS

## 2022-10-17 PROCEDURE — C1713 ANCHOR/SCREW BN/BN,TIS/BN: HCPCS | Performed by: THORACIC SURGERY (CARDIOTHORACIC VASCULAR SURGERY)

## 2022-10-17 PROCEDURE — 36592 COLLECT BLOOD FROM PICC: CPT

## 2022-10-17 PROCEDURE — C1751 CATH, INF, PER/CENT/MIDLINE: HCPCS | Performed by: THORACIC SURGERY (CARDIOTHORACIC VASCULAR SURGERY)

## 2022-10-17 PROCEDURE — 85014 HEMATOCRIT: CPT

## 2022-10-17 PROCEDURE — 85610 PROTHROMBIN TIME: CPT

## 2022-10-17 PROCEDURE — 84132 ASSAY OF SERUM POTASSIUM: CPT

## 2022-10-17 PROCEDURE — 2500000003 HC RX 250 WO HCPCS

## 2022-10-17 PROCEDURE — 6370000000 HC RX 637 (ALT 250 FOR IP): Performed by: THORACIC SURGERY (CARDIOTHORACIC VASCULAR SURGERY)

## 2022-10-17 PROCEDURE — 3700000000 HC ANESTHESIA ATTENDED CARE: Performed by: THORACIC SURGERY (CARDIOTHORACIC VASCULAR SURGERY)

## 2022-10-17 PROCEDURE — 2500000003 HC RX 250 WO HCPCS: Performed by: PHYSICIAN ASSISTANT

## 2022-10-17 PROCEDURE — 82962 GLUCOSE BLOOD TEST: CPT

## 2022-10-17 PROCEDURE — 6370000000 HC RX 637 (ALT 250 FOR IP): Performed by: PHYSICIAN ASSISTANT

## 2022-10-17 PROCEDURE — 84295 ASSAY OF SERUM SODIUM: CPT

## 2022-10-17 PROCEDURE — 85730 THROMBOPLASTIN TIME PARTIAL: CPT

## 2022-10-17 PROCEDURE — 2580000003 HC RX 258

## 2022-10-17 PROCEDURE — 2709999900 HC NON-CHARGEABLE SUPPLY: Performed by: THORACIC SURGERY (CARDIOTHORACIC VASCULAR SURGERY)

## 2022-10-17 PROCEDURE — C1769 GUIDE WIRE: HCPCS | Performed by: THORACIC SURGERY (CARDIOTHORACIC VASCULAR SURGERY)

## 2022-10-17 PROCEDURE — A4217 STERILE WATER/SALINE, 500 ML: HCPCS | Performed by: THORACIC SURGERY (CARDIOTHORACIC VASCULAR SURGERY)

## 2022-10-17 PROCEDURE — 80048 BASIC METABOLIC PNL TOTAL CA: CPT

## 2022-10-17 PROCEDURE — 82803 BLOOD GASES ANY COMBINATION: CPT

## 2022-10-17 PROCEDURE — 2580000003 HC RX 258: Performed by: THORACIC SURGERY (CARDIOTHORACIC VASCULAR SURGERY)

## 2022-10-17 PROCEDURE — 2500000003 HC RX 250 WO HCPCS: Performed by: NURSE ANESTHETIST, CERTIFIED REGISTERED

## 2022-10-17 PROCEDURE — 3700000001 HC ADD 15 MINUTES (ANESTHESIA): Performed by: THORACIC SURGERY (CARDIOTHORACIC VASCULAR SURGERY)

## 2022-10-17 PROCEDURE — 5A1221Z PERFORMANCE OF CARDIAC OUTPUT, CONTINUOUS: ICD-10-PCS | Performed by: THORACIC SURGERY (CARDIOTHORACIC VASCULAR SURGERY)

## 2022-10-17 PROCEDURE — 6360000002 HC RX W HCPCS: Performed by: NURSE ANESTHETIST, CERTIFIED REGISTERED

## 2022-10-17 PROCEDURE — 2720000010 HC SURG SUPPLY STERILE: Performed by: THORACIC SURGERY (CARDIOTHORACIC VASCULAR SURGERY)

## 2022-10-17 PROCEDURE — 6360000002 HC RX W HCPCS

## 2022-10-17 PROCEDURE — 3600000018 HC SURGERY OHS ADDTL 15MIN: Performed by: THORACIC SURGERY (CARDIOTHORACIC VASCULAR SURGERY)

## 2022-10-17 PROCEDURE — 2700000000 HC OXYGEN THERAPY PER DAY

## 2022-10-17 PROCEDURE — 85027 COMPLETE CBC AUTOMATED: CPT

## 2022-10-17 PROCEDURE — 93005 ELECTROCARDIOGRAM TRACING: CPT | Performed by: PHYSICIAN ASSISTANT

## 2022-10-17 PROCEDURE — P9041 ALBUMIN (HUMAN),5%, 50ML: HCPCS | Performed by: THORACIC SURGERY (CARDIOTHORACIC VASCULAR SURGERY)

## 2022-10-17 PROCEDURE — 2580000003 HC RX 258: Performed by: ANESTHESIOLOGY

## 2022-10-17 PROCEDURE — 94002 VENT MGMT INPAT INIT DAY: CPT

## 2022-10-17 PROCEDURE — 82330 ASSAY OF CALCIUM: CPT

## 2022-10-17 PROCEDURE — 021209W BYPASS CORONARY ARTERY, THREE ARTERIES FROM AORTA WITH AUTOLOGOUS VENOUS TISSUE, OPEN APPROACH: ICD-10-PCS | Performed by: THORACIC SURGERY (CARDIOTHORACIC VASCULAR SURGERY)

## 2022-10-17 PROCEDURE — 85520 HEPARIN ASSAY: CPT

## 2022-10-17 PROCEDURE — 85384 FIBRINOGEN ACTIVITY: CPT

## 2022-10-17 PROCEDURE — 2580000003 HC RX 258: Performed by: NURSE ANESTHETIST, CERTIFIED REGISTERED

## 2022-10-17 PROCEDURE — 6370000000 HC RX 637 (ALT 250 FOR IP): Performed by: ANESTHESIOLOGY

## 2022-10-17 PROCEDURE — 71045 X-RAY EXAM CHEST 1 VIEW: CPT

## 2022-10-17 PROCEDURE — 85347 COAGULATION TIME ACTIVATED: CPT

## 2022-10-17 PROCEDURE — 06BQ4ZZ EXCISION OF LEFT SAPHENOUS VEIN, PERCUTANEOUS ENDOSCOPIC APPROACH: ICD-10-PCS | Performed by: THORACIC SURGERY (CARDIOTHORACIC VASCULAR SURGERY)

## 2022-10-17 PROCEDURE — 2580000003 HC RX 258: Performed by: PHYSICIAN ASSISTANT

## 2022-10-17 PROCEDURE — 37799 UNLISTED PX VASCULAR SURGERY: CPT

## 2022-10-17 PROCEDURE — B24BZZ4 ULTRASONOGRAPHY OF HEART WITH AORTA, TRANSESOPHAGEAL: ICD-10-PCS | Performed by: ANESTHESIOLOGY

## 2022-10-17 PROCEDURE — 2100000000 HC CCU R&B

## 2022-10-17 PROCEDURE — C1729 CATH, DRAINAGE: HCPCS | Performed by: THORACIC SURGERY (CARDIOTHORACIC VASCULAR SURGERY)

## 2022-10-17 RX ORDER — MIDAZOLAM HYDROCHLORIDE 2 MG/2ML
1 INJECTION, SOLUTION INTRAMUSCULAR; INTRAVENOUS
Status: DISCONTINUED | OUTPATIENT
Start: 2022-10-17 | End: 2022-10-17 | Stop reason: SDUPTHER

## 2022-10-17 RX ORDER — NITROGLYCERIN 20 MG/100ML
0-100 INJECTION INTRAVENOUS CONTINUOUS PRN
Status: DISCONTINUED | OUTPATIENT
Start: 2022-10-17 | End: 2022-10-18

## 2022-10-17 RX ORDER — ROCURONIUM BROMIDE 10 MG/ML
INJECTION, SOLUTION INTRAVENOUS PRN
Status: DISCONTINUED | OUTPATIENT
Start: 2022-10-17 | End: 2022-10-17 | Stop reason: SDUPTHER

## 2022-10-17 RX ORDER — AMINOCAPROIC ACID 250 MG/ML
INJECTION, SOLUTION INTRAVENOUS PRN
Status: DISCONTINUED | OUTPATIENT
Start: 2022-10-17 | End: 2022-10-17 | Stop reason: SDUPTHER

## 2022-10-17 RX ORDER — ALBUMIN, HUMAN INJ 5% 5 %
25 SOLUTION INTRAVENOUS ONCE
Status: COMPLETED | OUTPATIENT
Start: 2022-10-17 | End: 2022-10-17

## 2022-10-17 RX ORDER — INSULIN LISPRO 100 [IU]/ML
0-6 INJECTION, SOLUTION INTRAVENOUS; SUBCUTANEOUS NIGHTLY
Status: DISCONTINUED | OUTPATIENT
Start: 2022-10-18 | End: 2022-10-21 | Stop reason: HOSPADM

## 2022-10-17 RX ORDER — MEPERIDINE HYDROCHLORIDE 25 MG/ML
25 INJECTION INTRAMUSCULAR; INTRAVENOUS; SUBCUTANEOUS ONCE
Status: COMPLETED | OUTPATIENT
Start: 2022-10-17 | End: 2022-10-17

## 2022-10-17 RX ORDER — SODIUM CHLORIDE 0.9 % (FLUSH) 0.9 %
5-40 SYRINGE (ML) INJECTION PRN
Status: DISCONTINUED | OUTPATIENT
Start: 2022-10-17 | End: 2022-10-17 | Stop reason: HOSPADM

## 2022-10-17 RX ORDER — ACETAMINOPHEN 500 MG
1000 TABLET ORAL ONCE
Status: COMPLETED | OUTPATIENT
Start: 2022-10-17 | End: 2022-10-17

## 2022-10-17 RX ORDER — DEXTROSE, SODIUM CHLORIDE, AND POTASSIUM CHLORIDE 5; .45; .15 G/100ML; G/100ML; G/100ML
INJECTION INTRAVENOUS CONTINUOUS
Status: DISCONTINUED | OUTPATIENT
Start: 2022-10-17 | End: 2022-10-21 | Stop reason: HOSPADM

## 2022-10-17 RX ORDER — OXYCODONE HYDROCHLORIDE AND ACETAMINOPHEN 5; 325 MG/1; MG/1
1 TABLET ORAL EVERY 4 HOURS PRN
Status: DISCONTINUED | OUTPATIENT
Start: 2022-10-17 | End: 2022-10-18

## 2022-10-17 RX ORDER — SODIUM CHLORIDE, SODIUM LACTATE, POTASSIUM CHLORIDE, CALCIUM CHLORIDE 600; 310; 30; 20 MG/100ML; MG/100ML; MG/100ML; MG/100ML
INJECTION, SOLUTION INTRAVENOUS CONTINUOUS PRN
Status: DISCONTINUED | OUTPATIENT
Start: 2022-10-17 | End: 2022-10-17 | Stop reason: SDUPTHER

## 2022-10-17 RX ORDER — SODIUM CHLORIDE 0.9 % (FLUSH) 0.9 %
5-40 SYRINGE (ML) INJECTION PRN
Status: DISCONTINUED | OUTPATIENT
Start: 2022-10-17 | End: 2022-10-18

## 2022-10-17 RX ORDER — DEXMEDETOMIDINE HYDROCHLORIDE 4 UG/ML
.1-1.5 INJECTION, SOLUTION INTRAVENOUS CONTINUOUS
Status: DISCONTINUED | OUTPATIENT
Start: 2022-10-17 | End: 2022-10-17

## 2022-10-17 RX ORDER — NITROGLYCERIN 20 MG/100ML
INJECTION INTRAVENOUS CONTINUOUS PRN
Status: DISCONTINUED | OUTPATIENT
Start: 2022-10-17 | End: 2022-10-17 | Stop reason: SDUPTHER

## 2022-10-17 RX ORDER — ETOMIDATE 2 MG/ML
INJECTION INTRAVENOUS PRN
Status: DISCONTINUED | OUTPATIENT
Start: 2022-10-17 | End: 2022-10-17 | Stop reason: SDUPTHER

## 2022-10-17 RX ORDER — GLYCOPYRROLATE 0.2 MG/ML
INJECTION INTRAMUSCULAR; INTRAVENOUS PRN
Status: DISCONTINUED | OUTPATIENT
Start: 2022-10-17 | End: 2022-10-17 | Stop reason: SDUPTHER

## 2022-10-17 RX ORDER — ONDANSETRON 2 MG/ML
4 INJECTION INTRAMUSCULAR; INTRAVENOUS EVERY 4 HOURS PRN
Status: DISCONTINUED | OUTPATIENT
Start: 2022-10-17 | End: 2022-10-18

## 2022-10-17 RX ORDER — POTASSIUM CHLORIDE 20 MEQ/1
20 TABLET, EXTENDED RELEASE ORAL 2 TIMES DAILY WITH MEALS
Status: DISCONTINUED | OUTPATIENT
Start: 2022-10-17 | End: 2022-10-17 | Stop reason: ALTCHOICE

## 2022-10-17 RX ORDER — HEPARIN SODIUM 1000 [USP'U]/ML
INJECTION, SOLUTION INTRAVENOUS; SUBCUTANEOUS PRN
Status: DISCONTINUED | OUTPATIENT
Start: 2022-10-17 | End: 2022-10-17 | Stop reason: SDUPTHER

## 2022-10-17 RX ORDER — ATORVASTATIN CALCIUM 80 MG/1
80 TABLET, FILM COATED ORAL NIGHTLY
Status: DISCONTINUED | OUTPATIENT
Start: 2022-10-17 | End: 2022-10-21 | Stop reason: HOSPADM

## 2022-10-17 RX ORDER — MIDAZOLAM HYDROCHLORIDE 1 MG/ML
INJECTION INTRAMUSCULAR; INTRAVENOUS PRN
Status: DISCONTINUED | OUTPATIENT
Start: 2022-10-17 | End: 2022-10-17 | Stop reason: SDUPTHER

## 2022-10-17 RX ORDER — TRAMADOL HYDROCHLORIDE 50 MG/1
100 TABLET ORAL EVERY 6 HOURS PRN
Status: DISCONTINUED | OUTPATIENT
Start: 2022-10-17 | End: 2022-10-17 | Stop reason: SDUPTHER

## 2022-10-17 RX ORDER — SODIUM CHLORIDE 0.9 % (FLUSH) 0.9 %
5-40 SYRINGE (ML) INJECTION EVERY 12 HOURS SCHEDULED
Status: DISCONTINUED | OUTPATIENT
Start: 2022-10-17 | End: 2022-10-17 | Stop reason: HOSPADM

## 2022-10-17 RX ORDER — NOREPINEPHRINE BIT/0.9 % NACL 16MG/250ML
.01-.1 INFUSION BOTTLE (ML) INTRAVENOUS CONTINUOUS PRN
Status: DISCONTINUED | OUTPATIENT
Start: 2022-10-17 | End: 2022-10-18

## 2022-10-17 RX ORDER — INSULIN GLARGINE 100 [IU]/ML
20 INJECTION, SOLUTION SUBCUTANEOUS ONCE
Status: COMPLETED | OUTPATIENT
Start: 2022-10-17 | End: 2022-10-17

## 2022-10-17 RX ORDER — DEXMEDETOMIDINE HYDROCHLORIDE 4 UG/ML
INJECTION, SOLUTION INTRAVENOUS CONTINUOUS PRN
Status: DISCONTINUED | OUTPATIENT
Start: 2022-10-17 | End: 2022-10-17 | Stop reason: SDUPTHER

## 2022-10-17 RX ORDER — SODIUM CHLORIDE 9 MG/ML
INJECTION, SOLUTION INTRAVENOUS CONTINUOUS PRN
Status: DISCONTINUED | OUTPATIENT
Start: 2022-10-17 | End: 2022-10-17 | Stop reason: SDUPTHER

## 2022-10-17 RX ORDER — AMIODARONE HYDROCHLORIDE 200 MG/1
200 TABLET ORAL 2 TIMES DAILY
Status: DISCONTINUED | OUTPATIENT
Start: 2022-10-17 | End: 2022-10-21 | Stop reason: HOSPADM

## 2022-10-17 RX ORDER — POTASSIUM CHLORIDE 29.8 MG/ML
20 INJECTION INTRAVENOUS PRN
Status: DISCONTINUED | OUTPATIENT
Start: 2022-10-17 | End: 2022-10-18

## 2022-10-17 RX ORDER — SODIUM CHLORIDE 9 MG/ML
INJECTION, SOLUTION INTRAVENOUS PRN
Status: DISCONTINUED | OUTPATIENT
Start: 2022-10-17 | End: 2022-10-17 | Stop reason: HOSPADM

## 2022-10-17 RX ORDER — ASPIRIN 81 MG/1
81 TABLET ORAL DAILY
Status: DISCONTINUED | OUTPATIENT
Start: 2022-10-18 | End: 2022-10-21 | Stop reason: HOSPADM

## 2022-10-17 RX ORDER — INSULIN LISPRO 100 [IU]/ML
0-12 INJECTION, SOLUTION INTRAVENOUS; SUBCUTANEOUS
Status: DISCONTINUED | OUTPATIENT
Start: 2022-10-18 | End: 2022-10-21 | Stop reason: HOSPADM

## 2022-10-17 RX ORDER — CHLORHEXIDINE GLUCONATE 0.12 MG/ML
10 RINSE ORAL 2 TIMES DAILY
Status: DISCONTINUED | OUTPATIENT
Start: 2022-10-17 | End: 2022-10-17

## 2022-10-17 RX ORDER — MIDAZOLAM HYDROCHLORIDE 2 MG/2ML
2 INJECTION, SOLUTION INTRAMUSCULAR; INTRAVENOUS
Status: DISCONTINUED | OUTPATIENT
Start: 2022-10-17 | End: 2022-10-17 | Stop reason: HOSPADM

## 2022-10-17 RX ORDER — LIDOCAINE HYDROCHLORIDE 20 MG/ML
INJECTION, SOLUTION EPIDURAL; INFILTRATION; INTRACAUDAL; PERINEURAL PRN
Status: DISCONTINUED | OUTPATIENT
Start: 2022-10-17 | End: 2022-10-17 | Stop reason: SDUPTHER

## 2022-10-17 RX ORDER — SODIUM CHLORIDE 9 MG/ML
INJECTION, SOLUTION INTRAVENOUS PRN
Status: DISCONTINUED | OUTPATIENT
Start: 2022-10-17 | End: 2022-10-18

## 2022-10-17 RX ORDER — SODIUM CHLORIDE, SODIUM LACTATE, POTASSIUM CHLORIDE, CALCIUM CHLORIDE 600; 310; 30; 20 MG/100ML; MG/100ML; MG/100ML; MG/100ML
INJECTION, SOLUTION INTRAVENOUS CONTINUOUS
Status: DISCONTINUED | OUTPATIENT
Start: 2022-10-17 | End: 2022-10-17 | Stop reason: HOSPADM

## 2022-10-17 RX ORDER — PROTAMINE SULFATE 10 MG/ML
INJECTION, SOLUTION INTRAVENOUS PRN
Status: DISCONTINUED | OUTPATIENT
Start: 2022-10-17 | End: 2022-10-17 | Stop reason: SDUPTHER

## 2022-10-17 RX ORDER — KETOROLAC TROMETHAMINE 30 MG/ML
30 INJECTION, SOLUTION INTRAMUSCULAR; INTRAVENOUS ONCE
Status: COMPLETED | OUTPATIENT
Start: 2022-10-17 | End: 2022-10-17

## 2022-10-17 RX ORDER — KETOROLAC TROMETHAMINE 15 MG/ML
15 INJECTION, SOLUTION INTRAMUSCULAR; INTRAVENOUS EVERY 6 HOURS PRN
Status: DISCONTINUED | OUTPATIENT
Start: 2022-10-17 | End: 2022-10-18

## 2022-10-17 RX ORDER — FAMOTIDINE 20 MG/1
20 TABLET, FILM COATED ORAL DAILY
Status: DISCONTINUED | OUTPATIENT
Start: 2022-10-17 | End: 2022-10-18

## 2022-10-17 RX ORDER — ACETAMINOPHEN 325 MG/1
650 TABLET ORAL EVERY 4 HOURS PRN
Status: DISCONTINUED | OUTPATIENT
Start: 2022-10-17 | End: 2022-10-18

## 2022-10-17 RX ORDER — AMIODARONE HYDROCHLORIDE 200 MG/1
600 TABLET ORAL ONCE
Status: COMPLETED | OUTPATIENT
Start: 2022-10-17 | End: 2022-10-17

## 2022-10-17 RX ORDER — EPHEDRINE SULFATE/0.9% NACL/PF 50 MG/5 ML
SYRINGE (ML) INTRAVENOUS PRN
Status: DISCONTINUED | OUTPATIENT
Start: 2022-10-17 | End: 2022-10-17 | Stop reason: SDUPTHER

## 2022-10-17 RX ORDER — MORPHINE SULFATE 4 MG/ML
3 INJECTION INTRAVENOUS
Status: DISCONTINUED | OUTPATIENT
Start: 2022-10-17 | End: 2022-10-18

## 2022-10-17 RX ORDER — VECURONIUM BROMIDE 1 MG/ML
INJECTION, POWDER, LYOPHILIZED, FOR SOLUTION INTRAVENOUS PRN
Status: DISCONTINUED | OUTPATIENT
Start: 2022-10-17 | End: 2022-10-17 | Stop reason: SDUPTHER

## 2022-10-17 RX ORDER — MORPHINE SULFATE 4 MG/ML
4 INJECTION INTRAVENOUS
Status: DISCONTINUED | OUTPATIENT
Start: 2022-10-17 | End: 2022-10-18

## 2022-10-17 RX ORDER — PAPAVERINE HYDROCHLORIDE 30 MG/ML
INJECTION INTRAMUSCULAR; INTRAVENOUS PRN
Status: DISCONTINUED | OUTPATIENT
Start: 2022-10-17 | End: 2022-10-17 | Stop reason: HOSPADM

## 2022-10-17 RX ORDER — MAGNESIUM SULFATE 1 G/100ML
1000 INJECTION INTRAVENOUS PRN
Status: DISCONTINUED | OUTPATIENT
Start: 2022-10-17 | End: 2022-10-18

## 2022-10-17 RX ORDER — MIDAZOLAM HYDROCHLORIDE 1 MG/ML
1 INJECTION INTRAMUSCULAR; INTRAVENOUS
Status: DISCONTINUED | OUTPATIENT
Start: 2022-10-17 | End: 2022-10-17

## 2022-10-17 RX ORDER — TRAMADOL HYDROCHLORIDE 50 MG/1
100 TABLET ORAL EVERY 6 HOURS PRN
Status: DISCONTINUED | OUTPATIENT
Start: 2022-10-17 | End: 2022-10-18

## 2022-10-17 RX ORDER — SODIUM CHLORIDE 0.9 % (FLUSH) 0.9 %
5-40 SYRINGE (ML) INJECTION EVERY 12 HOURS SCHEDULED
Status: DISCONTINUED | OUTPATIENT
Start: 2022-10-17 | End: 2022-10-18

## 2022-10-17 RX ORDER — LIDOCAINE HYDROCHLORIDE 10 MG/ML
1 INJECTION, SOLUTION INFILTRATION; PERINEURAL
Status: DISCONTINUED | OUTPATIENT
Start: 2022-10-17 | End: 2022-10-17 | Stop reason: HOSPADM

## 2022-10-17 RX ADMIN — PHENYLEPHRINE HYDROCHLORIDE 50 MCG: 10 INJECTION INTRAVENOUS at 09:46

## 2022-10-17 RX ADMIN — ONDANSETRON 4 MG: 2 INJECTION INTRAMUSCULAR; INTRAVENOUS at 20:17

## 2022-10-17 RX ADMIN — FENTANYL CITRATE 350 MCG: 50 INJECTION INTRAMUSCULAR; INTRAVENOUS at 07:15

## 2022-10-17 RX ADMIN — GLYCOPYRROLATE 0.2 MG: 0.2 INJECTION, SOLUTION INTRAMUSCULAR; INTRAVENOUS at 11:53

## 2022-10-17 RX ADMIN — MIDAZOLAM 2 MG: 1 INJECTION INTRAMUSCULAR; INTRAVENOUS at 11:04

## 2022-10-17 RX ADMIN — ACETAMINOPHEN 1000 MG: 500 TABLET, FILM COATED ORAL at 06:02

## 2022-10-17 RX ADMIN — MORPHINE SULFATE 4 MG: 4 INJECTION INTRAVENOUS at 13:14

## 2022-10-17 RX ADMIN — PHENYLEPHRINE HYDROCHLORIDE 20 MCG: 10 INJECTION INTRAVENOUS at 11:46

## 2022-10-17 RX ADMIN — FENTANYL CITRATE 150 MCG: 50 INJECTION INTRAMUSCULAR; INTRAVENOUS at 08:14

## 2022-10-17 RX ADMIN — ROCURONIUM BROMIDE 30 MG: 50 INJECTION, SOLUTION INTRAVENOUS at 11:04

## 2022-10-17 RX ADMIN — PHENYLEPHRINE HYDROCHLORIDE 20 MCG: 10 INJECTION INTRAVENOUS at 09:37

## 2022-10-17 RX ADMIN — SODIUM CHLORIDE: 9 INJECTION, SOLUTION INTRAVENOUS at 07:39

## 2022-10-17 RX ADMIN — ATORVASTATIN CALCIUM 80 MG: 80 TABLET, FILM COATED ORAL at 20:35

## 2022-10-17 RX ADMIN — NITROGLYCERIN 10 MCG/MIN: 20 INJECTION INTRAVENOUS at 12:30

## 2022-10-17 RX ADMIN — OXYCODONE AND ACETAMINOPHEN 1 TABLET: 5; 325 TABLET ORAL at 19:20

## 2022-10-17 RX ADMIN — ROCURONIUM BROMIDE 20 MG: 50 INJECTION, SOLUTION INTRAVENOUS at 10:52

## 2022-10-17 RX ADMIN — AMIODARONE HYDROCHLORIDE 600 MG: 200 TABLET ORAL at 06:02

## 2022-10-17 RX ADMIN — DEXTROSE MONOHYDRATE, SODIUM CHLORIDE, AND POTASSIUM CHLORIDE: 50; 4.5; 1.49 INJECTION, SOLUTION INTRAVENOUS at 13:15

## 2022-10-17 RX ADMIN — MEPERIDINE HYDROCHLORIDE 25 MG: 25 INJECTION INTRAMUSCULAR; INTRAVENOUS; SUBCUTANEOUS at 20:36

## 2022-10-17 RX ADMIN — ALBUMIN HUMAN 25 G: 50 SOLUTION INTRAVENOUS at 15:49

## 2022-10-17 RX ADMIN — ETOMIDATE 12 MG: 2 INJECTION, SOLUTION INTRAVENOUS at 07:15

## 2022-10-17 RX ADMIN — PHENYLEPHRINE HYDROCHLORIDE 50 MCG: 10 INJECTION INTRAVENOUS at 07:41

## 2022-10-17 RX ADMIN — NITROGLYCERIN 25 MCG: 20 INJECTION INTRAVENOUS at 08:28

## 2022-10-17 RX ADMIN — PHENYLEPHRINE HYDROCHLORIDE 25 MCG: 10 INJECTION INTRAVENOUS at 09:40

## 2022-10-17 RX ADMIN — FAMOTIDINE 20 MG: 20 TABLET, FILM COATED ORAL at 20:38

## 2022-10-17 RX ADMIN — ROCURONIUM BROMIDE 50 MG: 50 INJECTION, SOLUTION INTRAVENOUS at 07:15

## 2022-10-17 RX ADMIN — NITROGLYCERIN 25 MCG: 20 INJECTION INTRAVENOUS at 08:26

## 2022-10-17 RX ADMIN — Medication 10 MG: at 07:22

## 2022-10-17 RX ADMIN — HEPARIN SODIUM 30000 UNITS: 1000 INJECTION, SOLUTION INTRAVENOUS; SUBCUTANEOUS at 09:13

## 2022-10-17 RX ADMIN — NITROGLYCERIN 50 MCG: 20 INJECTION INTRAVENOUS at 12:04

## 2022-10-17 RX ADMIN — SODIUM CHLORIDE, PRESERVATIVE FREE 10 ML: 5 INJECTION INTRAVENOUS at 20:36

## 2022-10-17 RX ADMIN — Medication 5 MCG: at 09:46

## 2022-10-17 RX ADMIN — MIDAZOLAM 2 MG: 1 INJECTION INTRAMUSCULAR; INTRAVENOUS at 07:03

## 2022-10-17 RX ADMIN — FENTANYL CITRATE 250 MCG: 50 INJECTION INTRAMUSCULAR; INTRAVENOUS at 08:17

## 2022-10-17 RX ADMIN — VECURONIUM BROMIDE 2 MG: 1 INJECTION, POWDER, LYOPHILIZED, FOR SOLUTION INTRAVENOUS at 08:25

## 2022-10-17 RX ADMIN — Medication 2000 MG: at 11:25

## 2022-10-17 RX ADMIN — PHENYLEPHRINE HYDROCHLORIDE 20 MCG: 10 INJECTION INTRAVENOUS at 09:01

## 2022-10-17 RX ADMIN — VECURONIUM BROMIDE 3 MG: 1 INJECTION, POWDER, LYOPHILIZED, FOR SOLUTION INTRAVENOUS at 09:30

## 2022-10-17 RX ADMIN — MIDAZOLAM 1 MG: 1 INJECTION INTRAMUSCULAR; INTRAVENOUS at 07:06

## 2022-10-17 RX ADMIN — NITROGLYCERIN 10 MCG/MIN: 20 INJECTION INTRAVENOUS at 07:39

## 2022-10-17 RX ADMIN — FENTANYL CITRATE 100 MCG: 50 INJECTION INTRAMUSCULAR; INTRAVENOUS at 09:29

## 2022-10-17 RX ADMIN — VECURONIUM BROMIDE 5 MG: 1 INJECTION, POWDER, LYOPHILIZED, FOR SOLUTION INTRAVENOUS at 07:56

## 2022-10-17 RX ADMIN — FENTANYL CITRATE 100 MCG: 50 INJECTION INTRAMUSCULAR; INTRAVENOUS at 12:07

## 2022-10-17 RX ADMIN — SODIUM CHLORIDE 25 ML/HR: 900 INJECTION, SOLUTION INTRAVENOUS at 13:15

## 2022-10-17 RX ADMIN — PROTAMINE SULFATE 250 MG: 10 INJECTION, SOLUTION INTRAVENOUS at 11:41

## 2022-10-17 RX ADMIN — PHENYLEPHRINE HYDROCHLORIDE 50 MCG: 10 INJECTION INTRAVENOUS at 09:42

## 2022-10-17 RX ADMIN — Medication 0.01 MCG/KG/MIN: at 11:33

## 2022-10-17 RX ADMIN — FENTANYL CITRATE 100 MCG: 50 INJECTION INTRAMUSCULAR; INTRAVENOUS at 11:04

## 2022-10-17 RX ADMIN — TRAMADOL HYDROCHLORIDE 100 MG: 50 TABLET ORAL at 22:46

## 2022-10-17 RX ADMIN — CHLORHEXIDINE GLUCONATE 10 ML: 1.2 RINSE ORAL at 20:34

## 2022-10-17 RX ADMIN — KETOROLAC TROMETHAMINE 30 MG: 30 INJECTION, SOLUTION INTRAMUSCULAR at 13:41

## 2022-10-17 RX ADMIN — NITROGLYCERIN 25 MCG: 20 INJECTION INTRAVENOUS at 12:02

## 2022-10-17 RX ADMIN — SODIUM CHLORIDE, POTASSIUM CHLORIDE, SODIUM LACTATE AND CALCIUM CHLORIDE: 600; 310; 30; 20 INJECTION, SOLUTION INTRAVENOUS at 06:01

## 2022-10-17 RX ADMIN — AMINOCAPROIC ACID 10000 MG: 250 INJECTION, SOLUTION INTRAVENOUS at 07:39

## 2022-10-17 RX ADMIN — Medication 10 MG: at 07:25

## 2022-10-17 RX ADMIN — FENTANYL CITRATE 150 MCG: 50 INJECTION INTRAMUSCULAR; INTRAVENOUS at 08:25

## 2022-10-17 RX ADMIN — MORPHINE SULFATE 4 MG: 4 INJECTION INTRAVENOUS at 14:25

## 2022-10-17 RX ADMIN — TUBERCULIN PURIFIED PROTEIN DERIVATIVE 5 UNITS: 5 INJECTION, SOLUTION INTRADERMAL at 20:14

## 2022-10-17 RX ADMIN — INSULIN GLARGINE 20 UNITS: 100 INJECTION, SOLUTION SUBCUTANEOUS at 20:12

## 2022-10-17 RX ADMIN — KETOROLAC TROMETHAMINE 15 MG: 15 INJECTION, SOLUTION INTRAMUSCULAR; INTRAVENOUS at 22:46

## 2022-10-17 RX ADMIN — SODIUM CHLORIDE 0.5 MCG/KG/HR: 9 INJECTION, SOLUTION INTRAVENOUS at 12:30

## 2022-10-17 RX ADMIN — DEXMEDETOMIDINE HYDROCHLORIDE 0.5 MCG/KG/HR: 4 INJECTION, SOLUTION INTRAVENOUS at 10:45

## 2022-10-17 RX ADMIN — LIDOCAINE HYDROCHLORIDE 60 MG: 20 INJECTION, SOLUTION EPIDURAL; INFILTRATION; INTRACAUDAL; PERINEURAL at 07:15

## 2022-10-17 RX ADMIN — PHENYLEPHRINE HYDROCHLORIDE 100 MCG: 10 INJECTION INTRAVENOUS at 11:55

## 2022-10-17 RX ADMIN — CEFAZOLIN SODIUM 2000 MG: 100 INJECTION, POWDER, LYOPHILIZED, FOR SOLUTION INTRAVENOUS at 19:14

## 2022-10-17 RX ADMIN — AMIODARONE HYDROCHLORIDE 200 MG: 200 TABLET ORAL at 20:35

## 2022-10-17 RX ADMIN — Medication 2000 MG: at 07:55

## 2022-10-17 RX ADMIN — Medication 3 AMPULE: at 06:02

## 2022-10-17 RX ADMIN — LIDOCAINE HYDROCHLORIDE 100 MG: 20 INJECTION, SOLUTION EPIDURAL; INFILTRATION; INTRACAUDAL; PERINEURAL at 11:55

## 2022-10-17 RX ADMIN — SODIUM CHLORIDE, SODIUM LACTATE, POTASSIUM CHLORIDE, AND CALCIUM CHLORIDE: 600; 310; 30; 20 INJECTION, SOLUTION INTRAVENOUS at 07:03

## 2022-10-17 ASSESSMENT — PULMONARY FUNCTION TESTS
PIF_VALUE: 16
PIF_VALUE: 17
PIF_VALUE: 16
PIF_VALUE: 17
PIF_VALUE: 15
PIF_VALUE: 16
PIF_VALUE: 15
PIF_VALUE: 16
PIF_VALUE: 17
PIF_VALUE: 15
PIF_VALUE: 19
PIF_VALUE: 17
PIF_VALUE: 16

## 2022-10-17 ASSESSMENT — PAIN DESCRIPTION - LOCATION
LOCATION: CHEST
LOCATION: CHEST
LOCATION: CHEST;INCISION

## 2022-10-17 ASSESSMENT — PAIN SCALES - GENERAL
PAINLEVEL_OUTOF10: 2
PAINLEVEL_OUTOF10: 0
PAINLEVEL_OUTOF10: 6

## 2022-10-17 ASSESSMENT — PAIN DESCRIPTION - DESCRIPTORS
DESCRIPTORS: ACHING

## 2022-10-17 ASSESSMENT — PAIN DESCRIPTION - ORIENTATION
ORIENTATION: MID
ORIENTATION: ANTERIOR

## 2022-10-17 ASSESSMENT — PAIN - FUNCTIONAL ASSESSMENT: PAIN_FUNCTIONAL_ASSESSMENT: PREVENTS OR INTERFERES WITH ALL ACTIVE AND SOME PASSIVE ACTIVITIES

## 2022-10-17 ASSESSMENT — ENCOUNTER SYMPTOMS: SHORTNESS OF BREATH: 1

## 2022-10-17 NOTE — ANESTHESIA PROCEDURE NOTES
Procedure Performed: HECTOR       Start Time:  10/17/2022 7:59 AM       End Time:      Preanesthesia Checklist:  Patient identified, IV assessed, risks and benefits discussed, monitors and equipment assessed, procedure being performed at surgeon's request and anesthesia consent obtained. General Procedure Information  Diagnostic Indications for Echo:  assessment of ascending aorta and hemodynamic monitoring  Physician Requesting Echo: Flora Fitzgerald MD  Location performed:  OR  Intubated  Bite block placed  Heart visualized  Probe Insertion:  Easy  Probe Type:  Mulitplane  Modalities:  2D only and color flow mapping    Echocardiographic and Doppler Measurements    Ventricles    Right Ventricle:  Cavity size normal.  Global function normal.    Left Ventricle:  Cavity size normal.  Hypertrophy present. Global Function normal.          Valves    Aortic Valve: Annulus normal.  Stenosis not present. Regurgitation none. Leaflet motions normal.      Mitral Valve: Annulus normal.  Stenosis not present. Regurgitation mild. Leaflet motions normal.      Tricuspid Valve: Annulus normal.  Regurgitation none. Leaflet motions normal.    Pulmonic Valve:   Annulus normal.        Aorta    Ascending Aorta:  Size normal.    Descending Aorta:  Size normal.        Atria    Right Atrium:  Size normal.    Left Atrium:  Size normal.      Septa    Atrial Septum:  Intra-atrial septal morphology normal.      Ventricular Septum:  Intra-ventricular septum morphology normal.              Post Intervention Follow-up Study  Aortic Function: unchangedMitral Function: unchanged1+Tricuspid Function: unchangedNone

## 2022-10-17 NOTE — ANESTHESIA PROCEDURE NOTES
Central Venous Line:    A central venous line was placed using ultrasound guidance and surface landmarks, in the OR for the following indication(s): central venous access and CVP monitoring. 10/17/2022 7:24 AM10/17/2022 7:35 AM    Sterility preparation included the following: hand hygiene performed prior to procedure, maximum sterile barriers used and sterile technique used to drape from head to toe. The patient was placed in Trendelenburg position. The right internal jugular vein was prepped. The site was prepped with Chloraprep. A 9 Fr (size), double lumen was placed. During the procedure, the following specific steps were taken: target vein identified, needle advanced into vein and blood aspirated and guidewire advanced into vein. Intravenous verification was obtained by ultrasound, venous blood return and manometry. Post insertion care included: all ports aspirated, all ports flushed easily, guidewire removed intact, Biopatch applied, line sutured in place and dressing applied. During the procedure the patient experienced: patient tolerated procedure well with no complications and EBL < 5mL. Outcomes: uncomplicated  Real-time US image taken/store: yes. .No  Staffing  Performed: Anesthesiologist   Anesthesiologist: So Jama MD  Preanesthetic Checklist  Completed: patient identified, IV checked, site marked, risks and benefits discussed, equipment checked, timeout performed, anesthesia consent given, oxygen available and monitors applied/VS acknowledged

## 2022-10-17 NOTE — PROGRESS NOTES
TIMEOUT performed prior to extubation on [facility]. with RT, primary RN, and charge RN present on 10/17/2022 at 4:10 PM.     Per anesthesia team on admission, patient's intubation was normal.    ABG results as follows:    Lab Results   Component Value Date/Time    IBD 3.7 10/17/2022 11:59 AM         The patient is hemodynamically stable and can demonstrate the following on a consistent basis:  follow commands , elevate head off the pillow, nods appropriately to questions. Dr. Vanessa Willingham was aware of weaning parameters and order to extubate obtained. Patient extubated by Meghaa Kied RT to Airvo 71L/77%/18K without complication.

## 2022-10-17 NOTE — PROGRESS NOTES
Respiratory Mechanics completed and are as follows:   NIF -25, VC 1118, RSBI 32    Patient extubated to a AirVo 40L/ 40%. Patient is able to communicate and is negative for stridor. Breath sounds are clear and diminished. No complications with extubation.      Jose De Jesus Wright RCP

## 2022-10-17 NOTE — OR NURSING
.. TRANSFER - OUT REPORT:    Verbal report given to Ivan Schaefer on Markel Zimmerman.  being transferred to [facility] for routine progression of patient care       Report consisted of patient's Situation, Background, Assessment and   Recommendations(SBAR). Information from the following report(s) Surgery Report was reviewed with the receiving nurse. Lines:   CVC Double Lumen 10/17/22 (Active)       Peripheral IV 10/17/22 Right;Dorsal Hand (Active)       Arterial Line 10/17/22 Radial (Active)        Opportunity for questions and clarification was provided.       Patient transported with:  Monitor, O2 @ 15lpm, Patient-specific medications from Pharmacy, and Registered Nurse

## 2022-10-17 NOTE — ANESTHESIA PRE PROCEDURE
Department of Anesthesiology  Preprocedure Note       Name:  Davey Holguin. Age:  60 y.o.  :  1960                                          MRN:  [id number]         Date:  10/17/2022      Surgeon: Asad Mckeon):  Irena Buitrago MD    Procedure: Procedure(s):  CABG CORONARY ARTERY BYPASS/ OR #4/ Dr. Aki Jacobs requests Dr. Jemima López for anesthesia    Medications prior to admission:   Prior to Admission medications    Medication Sig Start Date End Date Taking? Authorizing Provider   aspirin 81 MG EC tablet Take 81 mg by mouth daily    Historical Provider, MD   acetaminophen (TYLENOL) 500 MG tablet Take 500 mg by mouth every 6 hours as needed for Pain    Historical Provider, MD   amiodarone (CORDARONE) 200 MG tablet Take 3 tablets by mouth once for 1 dose Take 3 tablets after 4pm the evening before surgery. 10/13/22 10/17/22  MIKE Burroughs   Magnesium 100 MG CAPS Take by mouth    Historical Provider, MD   metoprolol succinate (TOPROL XL) 25 MG extended release tablet Take 1 tablet by mouth daily  Patient taking differently: Take 25 mg by mouth every morning 22   Douglas Arita MD   atorvastatin (LIPITOR) 20 MG tablet TAKE 1 TABLET BY MOUTH EVERY DAY  Patient taking differently: Take 20 mg by mouth every morning 22   Marjorie Albert MD   olmesartan (BENICAR) 20 MG tablet TAKE 1 TABLET BY MOUTH EVERY DAY 22   Marjorie Albert MD   pregabalin (LYRICA) 150 MG capsule Take 150 mg by mouth 2 times daily.     Historical Provider, MD   vitamin B-12 (CYANOCOBALAMIN) 1000 MCG tablet Take 1,000 mcg by mouth daily    Historical Provider, MD   Cholecalciferol (VITAMIN D) 50 MCG (2000) CAPS capsule Take by mouth    Historical Provider, MD   metFORMIN (GLUCOPHAGE) 500 MG tablet TAKE 1 TABLET BY MOUTH TWICE A DAY WITH MEALS 22   Lenord Frankel, APRN - CNP   naproxen sodium (ANAPROX) 220 MG tablet Take 440 mg by mouth 2 times daily (with meals) Holding or surgery- instructed 10/13/22    Ar Automatic Reconciliation   omeprazole (PRILOSEC OTC) 20 MG tablet Take 20 mg by mouth daily As needed    Ar Automatic Reconciliation       Current medications:    Current Facility-Administered Medications   Medication Dose Route Frequency Provider Last Rate Last Admin    lidocaine 1 % injection 1 mL  1 mL IntraDERmal Once PRN L Linnette Ferguson MD        lactated ringers infusion   IntraVENous Continuous L Linnette Ferguson MD 75 mL/hr at 10/17/22 0601 New Bag at 10/17/22 0601    sodium chloride flush 0.9 % injection 5-40 mL  5-40 mL IntraVENous 2 times per day OLGA Ferguson MD        sodium chloride flush 0.9 % injection 5-40 mL  5-40 mL IntraVENous PRN OLGA Ferguson MD        0.9 % sodium chloride infusion   IntraVENous PRN OLGA Ferguson MD        midazolam PF (VERSED) injection 2 mg  2 mg IntraVENous Once PRN OLGA Ferguson MD        sodium chloride flush 0.9 % injection 5-40 mL  5-40 mL IntraVENous 2 times per day MIKE Hernandez        sodium chloride flush 0.9 % injection 5-40 mL  5-40 mL IntraVENous PRN MIKE Chu        0.9 % sodium chloride infusion   IntraVENous PRN MIKE Hernandez        ceFAZolin (ANCEF) 2000 mg in sterile water 20 mL IV syringe  2,000 mg IntraVENous On Call to [address] [place] [address]           Allergies:  No Known Allergies    Problem List:    Patient Active Problem List   Diagnosis Code    Mixed hyperlipidemia E78.2    Other bursal cyst, right hand M71.341    Low back pain potentially associated with spinal stenosis M54.50    Restless leg syndrome G25.81    Controlled type 2 diabetes mellitus without complication, without long-term current use of insulin ([facility]) E11.9    Weakness of both legs R29.898    Bilateral foot pain M79.671, M79.672    Gastroesophageal reflux disease without esophagitis K21.9    Essential hypertension I10    High foot arch Q66.70    Chronic SI joint pain M53.3, G89.29    Vladimir BP Readings from Last 3 Encounters:   10/16/22 118/75   10/13/22 (!) 150/74   10/04/22 (!) 153/83       NPO Status: Time of last liquid consumption: 0000                        Time of last solid consumption: 0000                        Date of last liquid consumption: 10/16/22                        Date of last solid food consumption: 10/16/22    BMI:   Wt Readings from Last 3 Encounters:   10/17/22 165 lb 8 oz (75.1 kg)   10/16/22 166 lb (75.3 kg)   10/13/22 171 lb 11.2 oz (77.9 kg)     Body mass index is 26.31 kg/m².     CBC:   Lab Results   Component Value Date/Time    WBC 8.7 10/16/2022 08:05 PM    RBC 4.42 10/16/2022 08:05 PM    HGB 13.4 10/16/2022 08:05 PM    HCT 39.6 10/16/2022 08:05 PM    MCV 89.6 10/16/2022 08:05 PM    RDW 14.1 10/16/2022 08:05 PM     10/16/2022 08:05 PM       CMP:   Lab Results   Component Value Date/Time     10/16/2022 08:05 PM    K 4.6 10/16/2022 08:05 PM     10/16/2022 08:05 PM    CO2 23 10/16/2022 08:05 PM    BUN 24 10/16/2022 08:05 PM    CREATININE 1.40 10/16/2022 08:05 PM    GFRAA >60 09/28/2022 12:37 PM    AGRATIO 1.8 01/07/2022 10:45 AM    LABGLOM 56 10/16/2022 08:05 PM    GLUCOSE 105 10/16/2022 08:05 PM    PROT 7.3 10/16/2022 08:05 PM    CALCIUM 8.5 10/16/2022 08:05 PM    BILITOT 0.6 10/16/2022 08:05 PM    ALKPHOS 100 10/16/2022 08:05 PM    ALKPHOS 118 01/07/2022 10:45 AM    AST 45 10/16/2022 08:05 PM    ALT 77 10/16/2022 08:05 PM       POC Tests:   Recent Labs     10/17/22  0557   POCGLU 136*       Coags:   Lab Results   Component Value Date/Time    PROTIME 14.1 10/13/2022 08:25 AM    INR 1.1 10/13/2022 08:25 AM    APTT 29.6 10/13/2022 08:25 AM       HCG (If Applicable): No results found for: PREGTESTUR, PREGSERUM, HCG, HCGQUANT     ABGs: No results found for: PHART, PO2ART, PUV3ZHP, DPX2WWQ, BEART, Z0DOJHJC     Type & Screen (If Applicable):  No results found for: LABABO, LABRH    Drug/Infectious Status (If Applicable):  No results found for: HIV, HEPCAB    COVID-19 Screening (If Applicable): No results found for: COVID19        Anesthesia Evaluation  Patient summary reviewed and Nursing notes reviewed  Airway: Mallampati: III  TM distance: >3 FB   Neck ROM: full  Mouth opening: > = 3 FB   Dental: normal exam         Pulmonary: breath sounds clear to auscultation  (+) shortness of breath:                             Cardiovascular:  Exercise tolerance: good (>4 METS),   (+) hypertension:, CAD:, hyperlipidemia        Rhythm: regular  Rate: normal                 ROS comment: Cath - LAD 80%, D1 90%, Cx 90%, %  Echo - normal EF, mild MR     Neuro/Psych:   Negative Neuro/Psych ROS              GI/Hepatic/Renal:   (+) GERD: well controlled,           Endo/Other:    (+) DiabetesType II DM, well controlled, , .                 Abdominal:             Vascular: negative vascular ROS. Other Findings:           Anesthesia Plan      general     ASA 4       Induction: intravenous. arterial line, CVP, HECTOR, BIS and continuous noninvasive hemodynamic monitor  MIPS: Postoperative ventilation. Anesthetic plan and risks discussed with patient and spouse. Use of blood products discussed with patient whom.                      Fco Whitney MD   10/17/2022

## 2022-10-17 NOTE — ANESTHESIA PROCEDURE NOTES
Arterial Line:    An arterial line was placed using surface landmarks, in the OR for the following indication(s): continuous blood pressure monitoring and blood sampling needed. A 20 gauge (size) (length), Arrow (type) catheter was placed, Seldinger technique used, into the left radial artery, secured by tape and Tegaderm. Anesthesia type: Local  Local infiltration: Injection    Events:  patient tolerated procedure well with no complications and EBL < 5mL. Additional notes:  Left arm prepped with ChloraPrep, 0.8ml of 1% lidocaine infiltrated at skin, Seldinger technique, good blood return, good waveform.    10/17/2022 7:04 AM10/17/2022 7:06 AM  Resident/CRNA: VINCENZO Isaac - CRNA  Performed: Resident/CRNA   Preanesthetic Checklist  Completed: patient identified, IV checked, site marked, risks and benefits discussed, surgical/procedural consents, equipment checked, pre-op evaluation, timeout performed, anesthesia consent given, oxygen available, monitors applied/VS acknowledged, fire risk safety assessment completed and verbalized and blood product R/B/A discussed and consented

## 2022-10-17 NOTE — ANESTHESIA POSTPROCEDURE EVALUATION
Department of Anesthesiology  Postprocedure Note    Patient: Markel Jane MRN: [id number]  YOB: 1960  Date of evaluation: 10/17/2022      Procedure Summary     Date: 10/17/22 Room / Location: [facility] MA[facility] OR 04 CARDIAC / [facility] MAIN OR    Anesthesia Start: 0703 Anesthesia Stop: 1250    Procedures:       CABG CORONARY ARTERY BYPASS (CABG X4) LIMA / ENDOSCOPIC VEIN HARVEST - GREATER SAPHENOUS VEIN (Chest)      TRANSESOPHAGEAL ECHOCARDIOGRAM Diagnosis:       Coronary atherosclerosis of native coronary artery      Abnormal cardiovascular stress test      Dyspnea      (Coronary atherosclerosis of native coronary artery [I25.10])      (Abnormal cardiovascular stress test [R94.39])      (Dyspnea [R06.00])    Providers: Tejal Teixeira MD Responsible Provider: Colt Richards MD    Anesthesia Type: general ASA Status: 4          Anesthesia Type: No value filed. Genesis Phase I: Genesis Score: 10    Genesis Phase II:        Anesthesia Post Evaluation    Patient location during evaluation: ICU  Patient participation: complete - patient cannot participate  Level of consciousness: sedated and ventilated  Airway patency: patent  Nausea & Vomiting: no nausea and no vomiting  Complications: no  Cardiovascular status: hemodynamically stable  Respiratory status: acceptable, ventilator and intubated  Hydration status: euvolemic  Comments: Blood pressure (!) 97/44, pulse 66, temperature 98.6 °F (37 °C), temperature source Temporal, resp. rate 18, height 5' 6.5\" (1.689 m), weight 165 lb 8 oz (75.1 kg), SpO2 100 %.       Pt stable for discharge from PACU  Multimodal analgesia pain management approach

## 2022-10-17 NOTE — CONSULTS
Cardiovascular ICU Consult Note: 10/17/2022  Albin Cosby. Admission Date: 10/17/2022     The patient's chart is reviewed and the patient is discussed with the staff. Subjective:     Patient is seen at the request of Ramírez Peters MD  for respiratory management status post cardiac surgery. Patient had CABG x 4. Currently is sedated in CV-ICU and orally intubated receiving  mechanical ventilation. We have been asked to see in the CV-ICU for mechanical ventilation management and weaning. He has a history of HTN, DM, HLD. He was seen by cardiology and reported FERRER and was found to have an elevated calcium score. Treadmill stress test showed EKG changes c/w ischemia and LHC showed severe mvCAD. There is no mention in the chart of underlying lung disease. Current Outpatient Medications   Medication Instructions    acetaminophen (TYLENOL) 500 mg, Oral, EVERY 6 HOURS PRN    amiodarone (CORDARONE) 600 mg, Oral, ONCE, Take 3 tablets after 4pm the evening before surgery.     aspirin 81 mg, Oral, DAILY    atorvastatin (LIPITOR) 20 MG tablet TAKE 1 TABLET BY MOUTH EVERY DAY    Cholecalciferol (VITAMIN D) 50 MCG (2000 UT) CAPS capsule Oral    Magnesium 100 MG CAPS Oral    metFORMIN (GLUCOPHAGE) 500 MG tablet TAKE 1 TABLET BY MOUTH TWICE A DAY WITH MEALS    metoprolol succinate (TOPROL XL) 25 mg, Oral, DAILY    naproxen sodium (ANAPROX) 440 mg, Oral, 2 TIMES DAILY WITH MEALS, Holding or surgery- instructed 10/13/22    olmesartan (BENICAR) 20 MG tablet TAKE 1 TABLET BY MOUTH EVERY DAY    omeprazole (PRILOSEC OTC) 20 mg, Oral, DAILY, As needed    pregabalin (LYRICA) 150 mg, Oral, 2 TIMES DAILY    vitamin B-12 (CYANOCOBALAMIN) 1,000 mcg, Oral, DAILY      Review of Systems: unable to determine due to intubated status  Past Medical History:   Diagnosis Date    Abnormal finding on EKG 08/05/2020    low voltage and incomplete RBBB    Anxiety and depression     Bilateral foot pain     Chronic back pain     Chronic back pain     Coronary artery disease     scheduled for CABG 10/17/22    Diabetes ([facility]     type 2- metformin- A1C 6.7 (10/13/22)- denies hypo episodes    Former smoker, stopped smoking in distant past     quit -  0.5 ppd x 26 years    H/O echocardiogram 2022    Echo LVEF 50-55%    HTN (hypertension)     Hx stress fracture     left foot    Impingement syndrome of right shoulder     Dr. Duyen Vicente    Insomnia     Lumbar spinal stenosis     Periodontal disease due to type 2 diabetes mellitus ([facility]     Dr. Busby Ro- dentist    Plantar fasciitis, bilateral     RLS (restless legs syndrome)      Past Surgical History:   Procedure Laterality Date    CARDIAC PROCEDURE N/A 2022    Left heart cath / coronary angiography performed by Cornelius Hughes MD at [address] [place] [address] N/A 2022    Fractional flow reserve (FFR) performed by Cornelius Hughes MD at [address] [place] [address] CATH LAB     Social History     Socioeconomic History    Marital status:      Spouse name: Not on file    Number of children: Not on file    Years of education: Not on file    Highest education level: Not on file   Occupational History    Not on file   Tobacco Use    Smoking status: Former     Packs/day: 0.50     Years: 26.00     Pack years: 13.00     Types: Cigarettes     Quit date:      Years since quittin.8    Smokeless tobacco: Never   Vaping Use    Vaping Use: Never used   Substance and Sexual Activity    Alcohol use:  Yes     Alcohol/week: 3.0 - 4.0 standard drinks     Types: 3 - 4 Cans of beer per week    Drug use: No    Sexual activity: Not on file   Other Topics Concern    Not on file   Social History Narrative    Not on file     Social Determinants of Health     Financial Resource Strain: Not on file   Food Insecurity: Not on file   Transportation Needs: Not on file   Physical Activity: Not on file   Stress: Not on file   Social Connections: Not on file   Intimate Partner Violence: Not on file   Housing Stability: Not on file     Family History   Problem Relation Age of Onset    Stroke Mother     COPD Father     Coronary Art Dis Father     Diabetes Sister      No Known Allergies  Objective:   Pulse 60, temperature 97.9 °F (36.6 °C), temperature source Oral, resp. rate (!) 2, height 5' 6.5\" (1.689 m), weight 165 lb 8 oz (75.1 kg), SpO2 99 %. Intake/Output Summary (Last 24 hours) at 10/17/2022 1247  Last data filed at 10/17/2022 1221  Gross per 24 hour   Intake 2200 ml   Output 1100 ml   Net 1100 ml     Physical Exam:          Constitutional:  Sedated, orally intubated and mechanically ventilated. EENMT:  Sclera clear, pupils equal, oral mucosa moist and orally intubated  Respiratory: ETT in position. Cta b, no w/r/r  Cardiovascular:  RRR. Loud rub present. Gastrointestinal:  soft; absent bowel sounds present  Musculoskeletal:  warm with no cyanosis, no lower extremity edema. [place] site left leg with ace wrap. Sedated with no movements. SKIN:  no jaundice or ecchymosis   Neurologic:  sedated but no gross neuro deficits  Psychiatric:  sedated and unable to assess at this time    CXR:    pending      LINES:  ETT, [place], swan shirley, arterial line, chest tubes times 2 in epigastric area without air leak.     DRIPS:   Dose (mcg/kg/hr) Dexmedetomidine: 0.5 mcg/kg/hr  Dose (mcg/min) Epinephrine: 0 mcg/min  Dose (mcg/kg/min) Phenylephrine : *0 mcg/min  Dose (units/hr) Insulin : 5 Units/hr     CI:       Ventilator Settings:  Ideal body weight: 64.9 kg (143 lb 3 oz)  Adjusted ideal body weight: 69 kg (152 lb 1.8 oz)  Mode FIO2 Rate Tidal Volume Pressure                     6     Peak airway pressure:     Minute ventilation:    Recent Labs     10/17/22  1058 10/17/22  1130 10/17/22  1159   PHAPOC 7.36 7.36 7.34*   OHJ5EDPD 40.4 40.0 39.8   HT6KKMB 351* 269* 289*   WQN1BOM 23.0 22.4 21.7*     Recent Labs     10/16/22  2005   WBC 8.7   HGB 13.4* HCT 39.6*        Recent Labs     10/16/22  2005      K 4.6   *   CO2 23   BUN 24*   CREATININE 1.40     No results for input(s): PROCAL, LACTATE in the last 72 hours. 09/30/22    TRANSTHORACIC ECHOCARDIOGRAM (TTE) COMPLETE (CONTRAST/BUBBLE/3D PRN) 09/30/2022  1:30 PM, 09/30/2022 12:00 AM (Final)    Interpretation Summary    Left Ventricle: Normal left ventricular systolic function with a visually estimated EF of 50 - 55%. Left ventricle size is normal. Normal wall thickness. Normal wall motion. Normal diastolic function. Aortic Valve: Valve structure is normal.    Mitral Valve: Mild regurgitation with a centrally directed jet. Tricuspid Valve: The estimated RVSP is 26 mmHg. Technical qualifiers: Color flow Doppler was performed and pulse wave and/or continuous wave Doppler was performed. Contrast used: Definity. Signed by: Meeta Amezquita MD on 9/30/2022  1:30 PM, Signed by: Unknown Provider Result on 9/30/2022 12:00 AM    Assessment and Plan :  (Medical Decision Making)   Impression: 60 y.o. y/o male s/p CV surgery for CAD in native artery    Encounter for weaning from Ventilator:  Post op ABG and CXR review. Continue with weaning per protocol. Hypoxemia:   Once weaned from ventilator will work on IS, mobility and weaning O2. Bronchodilators per protocol. Atelectasis:   IS, mobility after extubated. S/P Cardiovascular surgery:  Monitor chest tube output, removal per surgery. More than 50% of the time documented was spent in face-to-face contact with the patient and in the care of the patient on the floor/unit where the patient is located. Thank you for this referral.  We appreciate the opportunity to participate in this patient's care. Will follow along with you.     Layla Wood MD

## 2022-10-17 NOTE — CARE COORDINATION
Chart screened by  for discharge planning. No needs identified at this time. Please consult  if any new issues arise.          10/17/22 8233   Condition of Participation: Discharge Planning   The Plan for Transition of Care is related to the following treatment goals: Pending clinical progress

## 2022-10-17 NOTE — ED PROVIDER NOTES
Emergency Department Provider Note                   PCP:                Nehemiah Miranda MD               Age: 60 y.o. Sex: male     No diagnosis found. DISPOSITION          MDM  Number of Diagnoses or Management Options  Coronary artery disease involving native heart without angina pectoris, unspecified vessel or lesion type  Diagnosis management comments: [place] Charge nurse has confirmed laboratory testing is that is required and this has been completed by her and the patient will be discharged home. Amount and/or Complexity of Data Reviewed  Clinical lab tests: ordered and reviewed  Review and summarize past medical records: yes  Independent visualization of images, tracings, or specimens: yes    Risk of Complications, Morbidity, and/or Mortality  Presenting problems: moderate  Diagnostic procedures: moderate  Management options: moderate    Patient Progress  Patient progress: stable             Orders Placed This Encounter   Procedures    CBC with Auto Differential    Comprehensive Metabolic Panel    TYPE AND SCREEN        Medications - No data to display    New Prescriptions    No medications on file        Myla Russell is a 60 y.o. male who presents to the Emergency Department with chief complaint of    Chief Complaint   Patient presents with    Abnormal Test Results      Patient is a 14-year-old male with a history of hypertension diabetes hyperlipidemia scheduled for four-vessel CABG tomorrow morning who states she was sent to the emergency department on Sunday evening after 7 PM to have blood drawn according to the patient. Patient has no chest pain or chest pressure shortness of breath and has no physical complaints. Patient is scheduled tomorrow morning for Dr. Janiya Milner. Review of Systems   All other systems reviewed and are negative.     Past Medical History:   Diagnosis Date    Abnormal finding on EKG 08/05/2020    low voltage and incomplete RBBB    Anxiety and depression     Bilateral foot pain     Chronic back pain     Chronic back pain     Coronary artery disease     scheduled for CABG 10/17/22    [facility]     type 2- metformin- A1C 6.7 (10/13/22)- denies hypo episodes    Former smoker, stopped smoking in distant past     quit -  0.5 ppd x 26 years    H/O echocardiogram 2022    Echo LVEF 50-55%    HTN (hypertension)     Hx stress fracture     left foot    Impingement syndrome of right shoulder     Dr. 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 Rupinder Santiago- dentist    Plantar fasciitis, bilateral     RLS (restless legs syndrome)         Past Surgical History:   Procedure Laterality Date    CARDIAC PROCEDURE N/A 2022    Left heart cath / coronary angiography performed by Constantino Spangler MD at [address] [facility] [place] N/A 2022    Fractional flow reserve (FFR) performed by Constantino Spangler MD at [address] [facility] CATH LAB        Family History   Problem Relation Age of Onset    Stroke Mother     COPD Father     Coronary Art Dis Father     Diabetes Sister         Social History     Socioeconomic History    Marital status:      Spouse name: None    Number of children: None    Years of education: None    Highest education level: None   Tobacco Use    Smoking status: Former     Packs/day: 0.50     Years: 26.00     Pack years: 13.00     Types: Cigarettes     Quit date:      Years since quittin.8    Smokeless tobacco: Never   Vaping Use    Vaping Use: Never used   Substance and Sexual Activity    Alcohol use: Yes     Alcohol/week: 3.0 - 4.0 standard drinks     Types: 3 - 4 Cans of beer per week    Drug use: No         Patient has no known allergies.      Previous Medications    ACETAMINOPHEN (TYLENOL) 500 MG TABLET    Take 500 mg by mouth every 6 hours as needed for Pain    AMIODARONE (CORDARONE) 200 MG TABLET    Take 3 tablets by mouth once for 1 dose Take 3 tablets after 4pm the evening before surgery. ASPIRIN 81 MG EC TABLET    Take 81 mg by mouth daily    ATORVASTATIN (LIPITOR) 20 MG TABLET    TAKE 1 TABLET BY MOUTH EVERY DAY    CHOLECALCIFEROL (VITAMIN D) 50 MCG (2000 UT) CAPS CAPSULE    Take by mouth    MAGNESIUM 100 MG CAPS    Take by mouth    METFORMIN (GLUCOPHAGE) 500 MG TABLET    TAKE 1 TABLET BY MOUTH TWICE A DAY WITH MEALS    METOPROLOL SUCCINATE (TOPROL XL) 25 MG EXTENDED RELEASE TABLET    Take 1 tablet by mouth daily    NAPROXEN SODIUM (ANAPROX) 220 MG TABLET    Take 440 mg by mouth 2 times daily (with meals) Holding or surgery- instructed 10/13/22    OLMESARTAN (BENICAR) 20 MG TABLET    TAKE 1 TABLET BY MOUTH EVERY DAY    OMEPRAZOLE (PRILOSEC OTC) 20 MG TABLET    Take 20 mg by mouth daily As needed    PREGABALIN (LYRICA) 150 MG CAPSULE    Take 150 mg by mouth 2 times daily. VITAMIN B-12 (CYANOCOBALAMIN) 1000 MCG TABLET    Take 1,000 mcg by mouth daily        Vitals signs and nursing note reviewed. Patient Vitals for the past 4 hrs:   Temp Pulse Resp BP SpO2   10/16/22 1952 98.5 °F (36.9 °C) 75 18 114/70 98 %          Physical Exam  Vitals and nursing note reviewed. Constitutional:       Appearance: Normal appearance. HENT:      Head: Normocephalic and atraumatic. Nose: Nose normal.      Mouth/Throat:      Mouth: Mucous membranes are moist.   Eyes:      Extraocular Movements: Extraocular movements intact. Conjunctiva/sclera: Conjunctivae normal.      Pupils: Pupils are equal, round, and reactive to light. Cardiovascular:      Rate and Rhythm: Normal rate. Pulses: Normal pulses. Heart sounds: Normal heart sounds. Pulmonary:      Effort: Pulmonary effort is normal.      Breath sounds: Normal breath sounds. Abdominal:      General: Abdomen is flat. Musculoskeletal:         General: Normal range of motion. Cervical back: Normal range of motion and neck supple. Skin:     General: Skin is warm.       Capillary Refill: Capillary refill takes less than 2 seconds. Neurological:      General: No focal deficit present. Mental Status: He is alert and oriented to person, place, and time. Psychiatric:         Mood and Affect: Mood normal.         Behavior: Behavior normal.         Thought Content:  Thought content normal.         Judgment: Judgment normal.        Procedures    Results for orders placed or performed during the hospital encounter of 10/16/22   CBC with Auto Differential   Result Value Ref Range    WBC 8.7 4.3 - 11.1 K/uL    RBC 4.42 4.23 - 5.6 M/uL    Hemoglobin 13.4 (L) 13.6 - 17.2 g/dL    Hematocrit 39.6 (L) 41.1 - 50.3 %    MCV 89.6 82 - 102 FL    MCH 30.3 26.1 - 32.9 PG    MCHC 33.8 31.4 - 35.0 g/dL    RDW 14.1 11.9 - 14.6 %    Platelets 005 472 - 237 K/uL    MPV 13.1 (H) 9.4 - 12.3 FL    nRBC 0.00 0.0 - 0.2 K/uL    Seg Neutrophils 51 43 - 78 %    Lymphocytes 33 13 - 44 %    Monocytes 12 4.0 - 12.0 %    Eosinophils % 2 0.5 - 7.8 %    Basophils 1 0.0 - 2.0 %    Immature Granulocytes 1 0.0 - 5.0 %    Segs Absolute 4.4 1.7 - 8.2 K/UL    Absolute Lymph # 2.9 0.5 - 4.6 K/UL    Absolute Mono # 1.0 0.1 - 1.3 K/UL    Absolute Eos # 0.2 0.0 - 0.8 K/UL    Basophils Absolute 0.1 0.0 - 0.2 K/UL    Absolute Immature Granulocyte 0.1 0.0 - 0.5 K/UL    RBC Comment SLIGHT  ANISOCYTOSIS + POIKILOCYTOSIS        WBC Comment Result Confirmed By Smear      Platelet Comment ADEQUATE      Differential Type AUTOMATED     Comprehensive Metabolic Panel   Result Value Ref Range    Sodium 139 133 - 143 mmol/L    Potassium 4.6 3.5 - 5.1 mmol/L    Chloride 113 (H) 101 - 110 mmol/L    CO2 23 21 - 32 mmol/L    Anion Gap 3 2 - 11 mmol/L    Glucose 105 (H) 65 - 100 mg/dL    BUN 24 (H) 8 - 23 MG/DL    Creatinine 1.40 0.8 - 1.5 MG/DL    Est, Glom Filt Rate 56 (L) >60 ml/min/1.73m2    Calcium 8.5 8.3 - 10.4 MG/DL    Total Bilirubin 0.6 0.2 - 1.1 MG/DL    ALT 77 (H) 12 - 65 U/L    AST 45 (H) 15 - 37 U/L    Alk Phosphatase 100 50 - 136 U/L Total Protein 7.3 6.3 - 8.2 g/dL    Albumin 3.9 3.2 - 4.6 g/dL    Globulin 3.4 2.8 - 4.5 g/dL    Albumin/Globulin Ratio 1.1 0.4 - 1.6     TYPE AND SCREEN   Result Value Ref Range    Crossmatch expiration date 10/19/2022,2359     ABO/Rh O POSITIVE     Antibody Screen NEG     Unit Number [id number]     Product Code Blood Bank RC LR     Unit Divison 00     Dispense Status Blood Bank ALLOCATED     Crossmatch Result Compatible     Unit Number [id number]     Product Code Blood Bank RC LR     Unit Divison 00     Dispense Status Blood Bank ALLOCATED     Crossmatch Result Compatible         No orders to display                       Voice dictation software was used during the making of this note. This software is not perfect and grammatical and other typographical errors may be present. This note has not been completely proofread for errors.       Beulah Mchugh MD  10/16/22 4923

## 2022-10-17 NOTE — OR NURSING
Family not updated at 8222 by Praveen Torres RN. Message left to call surgery waiting for an update.

## 2022-10-17 NOTE — ANESTHESIA PROCEDURE NOTES
Airway  Date/Time: 10/17/2022 7:20 AM  Urgency: elective    Airway not difficult    General Information and Staff    Patient location during procedure: OR  Resident/CRNA: VINCENZO Walton - CRNA  Performed: resident/CRNA     Indications and Patient Condition  Indications for airway management: anesthesia  Spontaneous Ventilation: absent  Sedation level: deep  Preoxygenated: yes  Patient position: sniffing  MILS not maintained throughout  Mask difficulty assessment: vent by bag mask    Final Airway Details  Final airway type: endotracheal airway      Successful airway: ETT  Cuffed: yes   Successful intubation technique: video laryngoscopy  Facilitating devices/methods: cricoid pressure  Endotracheal tube insertion site: oral  Blade size: #3  ETT size (mm): 7.5  Cormack-Lehane Classification: grade I - full view of glottis  Placement verified by: chest auscultation and capnometry   Inital cuff pressure (cm H2O): 25  Measured from: lips  ETT to lips (cm): 23  Number of attempts at approach: 1  Ventilation between attempts: bag mask  Number of other approaches attempted: 0    Additional Comments  DL#1 3miller grade 3view, DL#2 glidescope grade 1 view ETT easily and atraumatically placed.  Anterior glottis   no

## 2022-10-17 NOTE — PROGRESS NOTES
Patient out from operating room and placed on the ventilator on documented settings. Patient is orally intubated with a # 7.5 ET Tube secured at the 23 cm tim at the teeth. Breath sounds are clear and diminished. Trachea is midline. Negative for subcutaneous air, chest excursion is symmetric. Negative for pitting edema. Patient is also negative for cyanosis. Patient has a Left Radial arterial line. Patient has 2 Chest tubes. All alarms are set and audible. Resuscitation bag is at the head of the bed. Ventilator Settings  Mode FIO2 Rate Tidal Volume Pressure PEEP I:E Ratio                           Peak airway pressure:     Minute ventilation:       ABG: No results for input(s): PH, PCO2, PO2, HCO3 in the last 72 hours.       Aidee Stahl, AMPAROP

## 2022-10-18 ENCOUNTER — APPOINTMENT (OUTPATIENT)
Dept: GENERAL RADIOLOGY | Age: 62
DRG: 236 | End: 2022-10-18
Attending: THORACIC SURGERY (CARDIOTHORACIC VASCULAR SURGERY)
Payer: COMMERCIAL

## 2022-10-18 LAB
ACT AVERAGE - AAVG: 548 SEC
ANION GAP SERPL CALC-SCNC: 7 MMOL/L (ref 2–11)
ARTERIAL PATENCY WRIST A: ABNORMAL
BASE DEFICIT BLD-SCNC: 1.6 MMOL/L
BDY SITE: ABNORMAL
BUN SERPL-MCNC: 20 MG/DL (ref 8–23)
CALCIUM SERPL-MCNC: 7.4 MG/DL (ref 8.3–10.4)
CHLORIDE SERPL-SCNC: 119 MMOL/L (ref 101–110)
CO2 SERPL-SCNC: 19 MMOL/L (ref 21–32)
CREAT SERPL-MCNC: 1.3 MG/DL (ref 0.8–1.5)
ERYTHROCYTE [DISTWIDTH] IN BLOOD BY AUTOMATED COUNT: 14.2 %
GAS FLOW.O2 O2 DELIVERY SYS: ABNORMAL L/MIN
GLUCOSE BLD STRIP.AUTO-MCNC: 100 MG/DL (ref 65–100)
GLUCOSE BLD STRIP.AUTO-MCNC: 104 MG/DL (ref 65–100)
GLUCOSE BLD STRIP.AUTO-MCNC: 109 MG/DL (ref 65–100)
GLUCOSE BLD STRIP.AUTO-MCNC: 131 MG/DL (ref 65–100)
GLUCOSE BLD STRIP.AUTO-MCNC: 75 MG/DL (ref 65–100)
GLUCOSE BLD STRIP.AUTO-MCNC: 75 MG/DL (ref 65–100)
GLUCOSE BLD STRIP.AUTO-MCNC: 87 MG/DL (ref 65–100)
GLUCOSE BLD STRIP.AUTO-MCNC: 97 MG/DL (ref 65–100)
GLUCOSE BLD STRIP.AUTO-MCNC: 98 MG/DL (ref 65–100)
GLUCOSE BLD STRIP.AUTO-MCNC: 98 MG/DL (ref 65–100)
GLUCOSE SERPL-MCNC: 100 MG/DL (ref 65–100)
HCO3 BLD-SCNC: 22.9 MMOL/L (ref 22–26)
HCT VFR BLD AUTO: 27 % (ref 41.1–50.3)
HGB BLD-MCNC: 8.9 G/DL (ref 13.6–17.2)
MAGNESIUM SERPL-MCNC: 2.4 MG/DL (ref 1.8–2.4)
MCH RBC QN AUTO: 30.1 PG (ref 26.1–32.9)
MCHC RBC AUTO-ENTMCNC: 33 G/DL (ref 31.4–35)
MCV RBC AUTO: 91.2 FL (ref 82–102)
MM INDURATION, POC: 0 MM (ref 0–5)
O2/TOTAL GAS SETTING VFR VENT: 60 %
PCO2 BLD: 36.7 MMHG (ref 35–45)
PEEP RESPIRATORY: 6 CMH2O
PH BLD: 7.4 [PH] (ref 7.35–7.45)
PLATELET # BLD AUTO: 83 K/UL
PMV BLD AUTO: 14.7 FL (ref 9.4–12.3)
PO2 BLD: 199 MMHG (ref 75–100)
POTASSIUM SERPL-SCNC: 4.5 MMOL/L (ref 3.5–5.1)
PPD, POC: NEGATIVE
RBC # BLD AUTO: 2.96 M/UL
SAO2 % BLD: 99.7 % (ref 95–98)
SERVICE CMNT-IMP: ABNORMAL
SERVICE CMNT-IMP: NORMAL
SODIUM SERPL-SCNC: 145 MMOL/L (ref 133–143)
SPECIMEN TYPE: ABNORMAL
VENTILATION MODE VENT: ABNORMAL
WBC # BLD AUTO: 8.5 K/UL (ref 4.3–11.1)

## 2022-10-18 PROCEDURE — 71045 X-RAY EXAM CHEST 1 VIEW: CPT

## 2022-10-18 PROCEDURE — 6360000002 HC RX W HCPCS: Performed by: THORACIC SURGERY (CARDIOTHORACIC VASCULAR SURGERY)

## 2022-10-18 PROCEDURE — 2580000003 HC RX 258: Performed by: PHYSICIAN ASSISTANT

## 2022-10-18 PROCEDURE — 97161 PT EVAL LOW COMPLEX 20 MIN: CPT

## 2022-10-18 PROCEDURE — 6370000000 HC RX 637 (ALT 250 FOR IP): Performed by: THORACIC SURGERY (CARDIOTHORACIC VASCULAR SURGERY)

## 2022-10-18 PROCEDURE — 6370000000 HC RX 637 (ALT 250 FOR IP): Performed by: PHYSICIAN ASSISTANT

## 2022-10-18 PROCEDURE — 36592 COLLECT BLOOD FROM PICC: CPT

## 2022-10-18 PROCEDURE — 83735 ASSAY OF MAGNESIUM: CPT

## 2022-10-18 PROCEDURE — 99232 SBSQ HOSP IP/OBS MODERATE 35: CPT | Performed by: INTERNAL MEDICINE

## 2022-10-18 PROCEDURE — 80048 BASIC METABOLIC PNL TOTAL CA: CPT

## 2022-10-18 PROCEDURE — 2580000003 HC RX 258: Performed by: THORACIC SURGERY (CARDIOTHORACIC VASCULAR SURGERY)

## 2022-10-18 PROCEDURE — 97110 THERAPEUTIC EXERCISES: CPT

## 2022-10-18 PROCEDURE — 85027 COMPLETE CBC AUTOMATED: CPT

## 2022-10-18 PROCEDURE — 2140000001 HC CVICU INTERMEDIATE R&B

## 2022-10-18 PROCEDURE — 6360000002 HC RX W HCPCS: Performed by: PHYSICIAN ASSISTANT

## 2022-10-18 PROCEDURE — 2500000003 HC RX 250 WO HCPCS: Performed by: PHYSICIAN ASSISTANT

## 2022-10-18 RX ORDER — BISACODYL 10 MG
10 SUPPOSITORY, RECTAL RECTAL DAILY PRN
Status: DISCONTINUED | OUTPATIENT
Start: 2022-10-18 | End: 2022-10-21 | Stop reason: HOSPADM

## 2022-10-18 RX ORDER — OXYCODONE HYDROCHLORIDE AND ACETAMINOPHEN 5; 325 MG/1; MG/1
1 TABLET ORAL EVERY 4 HOURS PRN
Status: DISCONTINUED | OUTPATIENT
Start: 2022-10-18 | End: 2022-10-18

## 2022-10-18 RX ORDER — LANOLIN ALCOHOL/MO/W.PET/CERES
400 CREAM (GRAM) TOPICAL 3 TIMES DAILY PRN
Status: DISCONTINUED | OUTPATIENT
Start: 2022-10-18 | End: 2022-10-21 | Stop reason: HOSPADM

## 2022-10-18 RX ORDER — FUROSEMIDE 40 MG/1
40 TABLET ORAL DAILY
Status: COMPLETED | OUTPATIENT
Start: 2022-10-19 | End: 2022-10-21

## 2022-10-18 RX ORDER — POLYETHYLENE GLYCOL 3350 17 G/17G
17 POWDER, FOR SOLUTION ORAL DAILY PRN
Status: DISCONTINUED | OUTPATIENT
Start: 2022-10-18 | End: 2022-10-21 | Stop reason: HOSPADM

## 2022-10-18 RX ORDER — ALBUMIN, HUMAN INJ 5% 5 %
25 SOLUTION INTRAVENOUS ONCE
Status: DISCONTINUED | OUTPATIENT
Start: 2022-10-18 | End: 2022-10-18 | Stop reason: ALTCHOICE

## 2022-10-18 RX ORDER — POTASSIUM CHLORIDE 20 MEQ/1
20 TABLET, EXTENDED RELEASE ORAL 2 TIMES DAILY PRN
Status: DISCONTINUED | OUTPATIENT
Start: 2022-10-18 | End: 2022-10-21 | Stop reason: HOSPADM

## 2022-10-18 RX ORDER — SENNA AND DOCUSATE SODIUM 50; 8.6 MG/1; MG/1
1 TABLET, FILM COATED ORAL 2 TIMES DAILY
Status: DISCONTINUED | OUTPATIENT
Start: 2022-10-18 | End: 2022-10-18

## 2022-10-18 RX ORDER — ONDANSETRON 2 MG/ML
4 INJECTION INTRAMUSCULAR; INTRAVENOUS EVERY 6 HOURS PRN
Status: DISCONTINUED | OUTPATIENT
Start: 2022-10-18 | End: 2022-10-21 | Stop reason: HOSPADM

## 2022-10-18 RX ORDER — SODIUM CHLORIDE 0.9 % (FLUSH) 0.9 %
5-40 SYRINGE (ML) INJECTION EVERY 12 HOURS SCHEDULED
Status: DISCONTINUED | OUTPATIENT
Start: 2022-10-18 | End: 2022-10-21 | Stop reason: HOSPADM

## 2022-10-18 RX ORDER — DEXTROSE MONOHYDRATE 100 MG/ML
INJECTION, SOLUTION INTRAVENOUS CONTINUOUS PRN
Status: DISCONTINUED | OUTPATIENT
Start: 2022-10-18 | End: 2022-10-21 | Stop reason: HOSPADM

## 2022-10-18 RX ORDER — ONDANSETRON 4 MG/1
4 TABLET, ORALLY DISINTEGRATING ORAL EVERY 8 HOURS PRN
Status: DISCONTINUED | OUTPATIENT
Start: 2022-10-18 | End: 2022-10-21 | Stop reason: HOSPADM

## 2022-10-18 RX ORDER — ACETAMINOPHEN 325 MG/1
650 TABLET ORAL EVERY 4 HOURS PRN
Status: DISCONTINUED | OUTPATIENT
Start: 2022-10-18 | End: 2022-10-21 | Stop reason: HOSPADM

## 2022-10-18 RX ORDER — OXYCODONE HYDROCHLORIDE AND ACETAMINOPHEN 5; 325 MG/1; MG/1
1 TABLET ORAL EVERY 4 HOURS PRN
Status: DISCONTINUED | OUTPATIENT
Start: 2022-10-18 | End: 2022-10-18 | Stop reason: SDUPTHER

## 2022-10-18 RX ORDER — POTASSIUM CHLORIDE 750 MG/1
10 TABLET, EXTENDED RELEASE ORAL DAILY
Status: COMPLETED | OUTPATIENT
Start: 2022-10-19 | End: 2022-10-21

## 2022-10-18 RX ORDER — LANOLIN ALCOHOL/MO/W.PET/CERES
400 CREAM (GRAM) TOPICAL 4 TIMES DAILY PRN
Status: DISCONTINUED | OUTPATIENT
Start: 2022-10-18 | End: 2022-10-21 | Stop reason: HOSPADM

## 2022-10-18 RX ORDER — OXYCODONE HYDROCHLORIDE AND ACETAMINOPHEN 5; 325 MG/1; MG/1
1 TABLET ORAL EVERY 4 HOURS PRN
Status: DISCONTINUED | OUTPATIENT
Start: 2022-10-18 | End: 2022-10-21 | Stop reason: HOSPADM

## 2022-10-18 RX ORDER — FAMOTIDINE 20 MG/1
20 TABLET, FILM COATED ORAL 2 TIMES DAILY
Status: DISCONTINUED | OUTPATIENT
Start: 2022-10-18 | End: 2022-10-21 | Stop reason: HOSPADM

## 2022-10-18 RX ORDER — POTASSIUM CHLORIDE 20 MEQ/1
40 TABLET, EXTENDED RELEASE ORAL 2 TIMES DAILY PRN
Status: DISCONTINUED | OUTPATIENT
Start: 2022-10-18 | End: 2022-10-21 | Stop reason: HOSPADM

## 2022-10-18 RX ORDER — SENNA AND DOCUSATE SODIUM 50; 8.6 MG/1; MG/1
2 TABLET, FILM COATED ORAL 2 TIMES DAILY
Status: DISCONTINUED | OUTPATIENT
Start: 2022-10-18 | End: 2022-10-21 | Stop reason: HOSPADM

## 2022-10-18 RX ORDER — SODIUM CHLORIDE 0.9 % (FLUSH) 0.9 %
5-40 SYRINGE (ML) INJECTION PRN
Status: DISCONTINUED | OUTPATIENT
Start: 2022-10-18 | End: 2022-10-21 | Stop reason: HOSPADM

## 2022-10-18 RX ORDER — TRAMADOL HYDROCHLORIDE 50 MG/1
100 TABLET ORAL EVERY 6 HOURS PRN
Status: DISCONTINUED | OUTPATIENT
Start: 2022-10-18 | End: 2022-10-21 | Stop reason: HOSPADM

## 2022-10-18 RX ADMIN — FAMOTIDINE 20 MG: 20 TABLET, FILM COATED ORAL at 20:43

## 2022-10-18 RX ADMIN — OXYCODONE AND ACETAMINOPHEN 1 TABLET: 5; 325 TABLET ORAL at 10:07

## 2022-10-18 RX ADMIN — FAMOTIDINE 20 MG: 20 TABLET, FILM COATED ORAL at 08:19

## 2022-10-18 RX ADMIN — ATORVASTATIN CALCIUM 80 MG: 80 TABLET, FILM COATED ORAL at 20:43

## 2022-10-18 RX ADMIN — SODIUM CHLORIDE, PRESERVATIVE FREE 10 ML: 5 INJECTION INTRAVENOUS at 17:01

## 2022-10-18 RX ADMIN — OXYCODONE AND ACETAMINOPHEN 1 TABLET: 5; 325 TABLET ORAL at 03:00

## 2022-10-18 RX ADMIN — KETOROLAC TROMETHAMINE 15 MG: 15 INJECTION, SOLUTION INTRAMUSCULAR; INTRAVENOUS at 05:35

## 2022-10-18 RX ADMIN — PHENYLEPHRINE HYDROCHLORIDE 20 MCG/MIN: 10 INJECTION INTRAVENOUS at 05:21

## 2022-10-18 RX ADMIN — SENNOSIDES AND DOCUSATE SODIUM 2 TABLET: 8.6; 5 TABLET ORAL at 20:42

## 2022-10-18 RX ADMIN — CEFAZOLIN SODIUM 2000 MG: 100 INJECTION, POWDER, LYOPHILIZED, FOR SOLUTION INTRAVENOUS at 03:00

## 2022-10-18 RX ADMIN — Medication 1 AMPULE: at 20:41

## 2022-10-18 RX ADMIN — AMIODARONE HYDROCHLORIDE 200 MG: 200 TABLET ORAL at 20:42

## 2022-10-18 RX ADMIN — OXYCODONE AND ACETAMINOPHEN 1 TABLET: 5; 325 TABLET ORAL at 20:49

## 2022-10-18 RX ADMIN — AMIODARONE HYDROCHLORIDE 200 MG: 200 TABLET ORAL at 08:19

## 2022-10-18 RX ADMIN — SODIUM CHLORIDE, PRESERVATIVE FREE 10 ML: 5 INJECTION INTRAVENOUS at 20:43

## 2022-10-18 ASSESSMENT — PAIN SCALES - GENERAL
PAINLEVEL_OUTOF10: 2
PAINLEVEL_OUTOF10: 2
PAINLEVEL_OUTOF10: 0
PAINLEVEL_OUTOF10: 2
PAINLEVEL_OUTOF10: 0
PAINLEVEL_OUTOF10: 9
PAINLEVEL_OUTOF10: 0
PAINLEVEL_OUTOF10: 6
PAINLEVEL_OUTOF10: 0
PAINLEVEL_OUTOF10: 6
PAINLEVEL_OUTOF10: 2
PAINLEVEL_OUTOF10: 0

## 2022-10-18 ASSESSMENT — PAIN DESCRIPTION - ONSET: ONSET: ON-GOING

## 2022-10-18 ASSESSMENT — PAIN - FUNCTIONAL ASSESSMENT: PAIN_FUNCTIONAL_ASSESSMENT: ACTIVITIES ARE NOT PREVENTED

## 2022-10-18 ASSESSMENT — PAIN DESCRIPTION - ORIENTATION
ORIENTATION: ANTERIOR
ORIENTATION: MID
ORIENTATION: MID

## 2022-10-18 ASSESSMENT — PAIN DESCRIPTION - DESCRIPTORS
DESCRIPTORS: ACHING

## 2022-10-18 ASSESSMENT — PAIN DESCRIPTION - LOCATION
LOCATION: CHEST
LOCATION: CHEST;INCISION
LOCATION: STERNUM

## 2022-10-18 ASSESSMENT — PAIN DESCRIPTION - PAIN TYPE: TYPE: SURGICAL PAIN

## 2022-10-18 ASSESSMENT — PAIN DESCRIPTION - FREQUENCY: FREQUENCY: INTERMITTENT

## 2022-10-18 NOTE — PROGRESS NOTES
PT was sitting in chair. [address] [place] [address] introduced self. PT shared his illness and hospitalization. [address] [place] [address] checked for unmet needs and offered support. Rev. ELANA SoteloDiv.

## 2022-10-18 NOTE — PROGRESS NOTES
Up to walk 25 feet with walker doing well. Back to recliner with 200ml dump f serosangeous blood. BP in 70s, patient states he feels fine. Already receiving bolus, maryan. Gtt started per protocol. Will monitor.

## 2022-10-18 NOTE — OP NOTE
[facility]  OPERATIVE REPORT    Name:  Ivana Alonzo  MR#:  [id number]  :  1960  ACCOUNT #:  [de-identified]  DATE OF SERVICE:  10/17/2022    PREOPERATIVE DIAGNOSIS:  Coronary artery occlusive disease. POSTOPERATIVE DIAGNOSIS:  Coronary artery occlusive disease. PROCEDURE PERFORMED:  Four-vessel coronary artery bypass grafting using reverse saphenous vein graft to the diagonal coronary; reverse saphenous vein graft to the intermediate coronary, and reverse saphenous vein graft to the posterior descending coronary; left internal mammary artery to the left anterior descending coronary; endoscopic vein harvest; (arterial line placed by Anesthesia); temporary right ventricular pacing wire. SURGEON:  Juan C Mendez. Ha Osorio MD    ASSISTANT:       ANESTHESIA:  General.    COMPLICATIONS:   .    SPECIMENS REMOVED:   .    IMPLANTS:   .    ESTIMATED BLOOD LOSS:  minimal.    CARDIOPULMONARY BYPASS TIME:  107 minutes. AORTIC CROSS-CLAMP TIME:  93 minutes. PREOPERATIVE HISTORY:  This is a 69-year-old gentleman who was evaluated for chest discomfort and exertional shortness of breath. Cardiac catheterization showed severe triple-vessel coronary disease. Surgical revascularization was then recommended. WHAT WAS FOUND/WHAT WAS DONE:  The heart was exposed through a median sternotomy. The heart was normal in size and contracted well without scarring. Four coronaries were grafted placing reverse vein to the posterior descending coronary, reverse vein to the diagonal coronary and reverse vein to the intermediate coronary. The internal mammary artery was used to bypass the left anterior descending coronary. The patient came off bypass without trouble. PROCEDURE IN DETAIL:  The patient was premedicated and brought to the operating room. The patient was placed supine on the table. Appropriate monitoring lines were placed including an arterial line with flow tract monitoring.   General endotracheal anesthesia was given. The anterior body and both legs were then prepped with Betadine and the patient draped into a sterile field. Endoscopic vein harvest was carried out with removal of the segment of vein from the left leg. The vein was prepared. The skin of the leg was closed with subcuticular closure technique. The chest was opened in the midline, a sternotomy was carried out and left pleural cavity was opened widely. The mammary artery was taken down. Next, pericardium was opened vertically and retracted. Heparin at 300 units per kg was given. The patient was cannulated, placed on bypass and cooled to 32-degree centigrade. The aorta was then cross-clamped and blood cardioplegia was given antegrade. The posterior descending coronary was identified, opened for a 5-mm length and reverse vein was sutured to this vessel with a 7-0 Prolene. Next, the intermediate coronary was identified and opened and a second reverse vein was sutured to this coronary with 7-0 Prolene. Next, a third individual segment of the reverse vein was sutured to the diagonal coronary with a 7-0 Prolene. Finally, the internal mammary artery was sutured to the mid left anterior descending coronary with a running 7-0 Prolene. Next, three 4-mm buttons of aortic wall were removed and the proximal end of all three vein grafts were sutured directly to the aorta with 6-0 Prolene. Aortic cross-clamp was released. The patient was rewarmed to 37-degree centigrade. The patient was weaned from bypass without difficulty. All blood was then returned to the patient after which cannulas were removed and the pursestring sutures tied. Protamine was then given to reverse the heparin. Temporary right ventricular pacing wires were placed. 32-Panamanian tubes were left to drain the mediastinum and left chest cavity. Hemostasis was satisfactory.   The sternum was re-approximated with interrupted stainless steel wire, soft tissues were closed with Vicryl, and the skin was closed with Vicryl using a subcuticular closure technique. The patient tolerated the procedure well and was moved to intensive care in stable condition. Sponge, needle and instrument counts were correct.       MD MARYLU [id number]  D:  10/18/2022 15:47  T:  10/18/2022 16:20  JOB #:  [id number]

## 2022-10-18 NOTE — PROGRESS NOTES
Today's Date: 10/18/2022  Date of Admission: 10/17/2022    Chart Reviewed. Subjective:     Patient feels ok. He complains of some incisional pain. He denies any dyspnea. Medications Reviewed. Objective:     Vitals:    10/18/22 0915 10/18/22 0930 10/18/22 0945 10/18/22 1000   BP: (!) 92/50 (!) 96/53 (!) 89/51 (!) 91/55   Pulse: 68 69 70 69   Resp: 10 13 15 18   Temp:       TempSrc:       SpO2:       Weight:       Height:           Intake and Output  Current Shift: 10/18 0701 - 10/18 1900  In: -   Out: 30 [Urine:30]   Last 3 Shifts: 10/16 1901 - 10/18 0700  In: 4648.8 [P.O.:360; I.V.:3788.8]  Out: 2660 [Urine:2140]    Physical Exam:  General: Well Developed, Well Nourished, No Acute Distress, Alert & Oriented x 3, Appropriate mood  Neck: supple, no JVD  Heart: S1S2 with RRR without murmurs or gallops  Lungs: Clear throughout auscultation bilaterally without adventitious sounds  Abd: soft, nontender, nondistended, with good bowel sounds  Ext: no edema bilaterally  Skin: warm and dry    LABS  Data Review:   Recent Labs     10/17/22  1251 10/17/22  1717 10/17/22  2045 10/18/22  0308   *  --   --  145*   K 3.9 4.4 4.3 4.5   MG 3.2* 2.5* 2.5* 2.4   BUN 19  --   --  20   WBC 17.4* 11.7*  --  8.5   HGB 11.6* 9.8* 9.6* 8.9*   HCT 34.6* 28.8* 28.1* 27.0*   * 88*  --  83   INR 1.4  --   --   --        Estimated Creatinine Clearance: 59 mL/min (based on SCr of 1.3 mg/dL).       Assessment/Plan:     *S/P CABG x 4  Holding ASA due to thrombocytopenia, holding BB due to hypotension, ambulate    Active Hospital Problems    CAD in native artery  S/p CABG       Encounter for weaning from ventilator [facility])      CAD, multiple vessel  S/p CABG       Abnormal stress test  S/p CABG      Dyspnea      Gastroesophageal reflux disease without esophagitis  Continue Pepcid      Controlled type 2 diabetes mellitus without complication, without long-term current use of insulin (HCC)  SSI, on Metformin at home Essential hypertension  BP low, holding meds         Zuleyma Shook PA-C

## 2022-10-18 NOTE — PROGRESS NOTES
ACUTE PHYSICAL THERAPY GOALS:   (Developed with and agreed upon by patient and/or caregiver. )    LTG:  (1.)Mr. Estevez will move from supine to sit and sit to supine , scoot up and down and roll side to side in flat bed without siderails with  INDEPENDENT within 7 day(s). (2.)Mr. Estevez will perform all functional transfers with  INDEPENDENT using  no device within 7 day(s). (3.)Mr. Estevez will ambulate with  INDEPENDENT for 750+ feet with normal vital sign response with no device within 7 day(s). (4.)Mr. Estevez will ambulate up/down 2 steps with bilateral railing with  MODIFIED INDEPENDENCE with no device within  7  day(s). PHYSICAL THERAPY Initial Assessment, Daily Note, and PM  (Link to Caseload Tracking: PT Visit Days : 1  Acknowledge Orders  Time In/Out  PT Charge Capture  Rehab Caseload Tracker    Slava Buchanan is a 60 y.o. male   PRIMARY DIAGNOSIS: S/P CABG x 4  Coronary atherosclerosis of native coronary artery [I25.10]  Abnormal cardiovascular stress test [R94.39]  Dyspnea [R06.00]  CAD in native artery [I25.10]  Procedure(s) (LRB):  CABG CORONARY ARTERY BYPASS (CABG X4) LIMA / ENDOSCOPIC VEIN HARVEST - GREATER SAPHENOUS VEIN (N/A)  TRANSESOPHAGEAL ECHOCARDIOGRAM (N/A)  1 Day Post-Op  Reason for Referral: Other abnormalities of gait and mobility (R26.89)  Inpatient: Payor: Martine Baxter / Plan: [facility] / Product Type: *No Product type* /     ASSESSMENT:     REHAB RECOMMENDATIONS:   Recommendation to date pending progress:  Setting:  No further skilled therapy after discharge from hospital    Equipment:    None     ASSESSMENT:  Mr. Alex Yuen lives alone, he is independent in home and community, works. Today he stood and ambulated with SBA. BP in standing prior to ambulation 89/46, no reports of lightheadedness. HR increased from 70's to 80's with mobility. He has declined in functional mobility after undergoing above surgery.    Zenaida would benefit from skilled physical therapy while in acute care (medically necessary) to address his deficits and maximize his function.    .     [address] [facility] [address] [address] AM-PAC 6 Clicks Basic Mobility Inpatient Short Form  AM-PAC Mobility Inpatient   How much difficulty turning over in bed?: A Little  How much difficulty sitting down on / standing up from a chair with arms?: A Little  How much difficulty moving from lying on back to sitting on side of bed?: A Little  How much help from another person moving to and from a bed to a chair?: A Little  How much help from another person needed to walk in hospital room?: A Little  How much help from another person for climbing 3-5 steps with a railing?: A Little  AM-[facility] Inpatient Mobility Raw Score : 18  AM-PAC Inpatient T-Scale Score : 43.63  Mobility Inpatient CMS 0-100% Score: 46.58  Mobility Inpatient CMS G-Code Modifier : CK    SUBJECTIVE:   Mr. Deborah Maynard states, \"I'll walk as far as you want\"     Social/Functional Lives With: Alone  Type of Home: House  Home Layout: One level  Home Access: Stairs to enter without rails  Entrance Stairs - Number of Steps: 1  Ambulation Assistance: Independent  Transfer Assistance: Independent  Active : Yes  Occupation: Full time employment    OBJECTIVE:     PAIN: [place] Adarsh / O2: Tara Drown / Genie Diaser / Bandar Rivasock:   Pre Treatment:   Pain Assessment: 0-10  Pain Level: 2  Non-Pharmaceutical Pain Intervention(s): Repositioned      Post Treatment: 2 Vitals        Oxygen      IV    RESTRICTIONS/PRECAUTIONS:                    GROSS EVALUATION: Intact Impaired (Comments):   AROM [x]     PROM []    Strength [x]     Balance []     Posture [] Forward Head  Rounded Shoulders   Sensation []     Coordination [x]      Tone [x]     Edema []    Activity Tolerance []      []      COGNITION/  PERCEPTION: Intact Impaired (Comments):   Orientation [x]     Vision [x]     Hearing [x]     Cognition  [x]       MOBILITY: I Mod I S SBA CGA Min Mod Max Total NT x2 Comments:   Bed Mobility    Rolling [] [] [] [] [] [] [] [] [] [x] []    Supine to Sit [] [] [] [] [] [] [] [] [] [x] []    Scooting [] [] [] [] [] [] [] [] [] [x] []    Sit to Supine [] [] [] [] [] [] [] [] [] [x] []    Transfers    Sit to Stand [] [] [] [x] [] [] [] [] [] [] []    Bed to Chair [] [] [] [x] [] [] [] [] [] [] []    Stand to Sit [] [] [] [x] [] [] [] [] [] [] []     [] [] [] [] [] [] [] [] [] [] []    I=Independent, Mod I=Modified Independent, S=Supervision, SBA=Standby Assistance, CGA=Contact Guard Assistance,   Min=Minimal Assistance, Mod=Moderate Assistance, Max=Maximal Assistance, Total=Total Assistance, NT=Not Tested    GAIT: I Mod I S SBA CGA Min Mod Max Total  NT x2 Comments:   Level of Assistance [] [] [] [x] [] [] [] [] [] [] []    Distance 400 feet    DME Cardiac Walker    Gait Quality Decreased rochelle  and Decreased step length    Weightbearing Status      Stairs      I=Independent, Mod I=Modified Independent, S=Supervision, SBA=Standby Assistance, CGA=Contact Guard Assistance,   Min=Minimal Assistance, Mod=Moderate Assistance, Max=Maximal Assistance, Total=Total Assistance, NT=Not Tested    PLAN:   FREQUENCY AND DURATION: BID for duration of hospital stay or until stated goals are met, whichever comes first.    THERAPY PROGNOSIS: Excellent    PROBLEM LIST:   (Skilled intervention is medically necessary to address:)  Decreased Activity Tolerance  Decreased Balance  Decreased Gait Ability  Decreased Transfer Abilities  Increased Pain INTERVENTIONS PLANNED:   (Benefits and precautions of physical therapy have been discussed with the patient.)  Therapeutic Activity  Therapeutic Exercise/HEP  Gait Training  Education       TREATMENT:   EVALUATION: LOW COMPLEXITY: (Untimed Charge)    TREATMENT:   Therapeutic Exercise (8 Minutes): Therapeutic exercises noted below to improve functional activity tolerance, strength, and mobility.      TREATMENT GRID:     DATE: 10/18/22       Ambulation Hip Flexion X10 AB       Long Arc Quads X20 AB       Hip ab/ad        Heel Raises X20 AB       Toe Raises X20 AB                                Key:  A=active, AA=active assisted, P=passive, B=bilaterally, R=right, L=left   DF=dorsiflexion, PF=plantarflexion      AFTER TREATMENT PRECAUTIONS: Bed/Chair Locked, Call light within reach, Chair, and Needs within reach    INTERDISCIPLINARY COLLABORATION:  RN/ PCT and PT/ PTA    EDUCATION: Education Given To: Patient  Education Provided: Role of Therapy;Plan of Care  Education Outcome: Verbalized understanding    TIME IN/OUT:  Time In: 1300  Time Out: [address]  Minutes: [address] [facility],

## 2022-10-18 NOTE — PROGRESS NOTES
Cardiovascular ICU Pulmonary daily Progress Note: 10/18/2022  Michael Quintero. Admission Date: 10/17/2022     Patient is seen at the request of Carleen Junior MD  for respiratory management status post cardiac surgery. Patient had CABG x 4. Currently is sedated in CV-ICU and orally intubated receiving  mechanical ventilation. We have been asked to see in the CV-ICU for mechanical ventilation management and weaning. He has a history of HTN, DM, HLD. He was seen by cardiology and reported FERRER and was found to have an elevated calcium score. Treadmill stress test showed EKG changes c/w ischemia and LHC showed severe mvCAD. There is no mention in the chart of underlying lung disease. The patient's chart is reviewed and the patient is discussed with the staff. Subjective:     Patient today is awake and alert. Off vent last night. On RA now. Still on small dose of maryan. No pain no issues. Review of Systems:  -Fever  -Headaches  -Chest pain  -Dyspnea, -wheezing, -cough  -Abdominal pain, -constipation  -Leg swelling  All other organ systems grossly normal.      Current Outpatient Medications   Medication Instructions    acetaminophen (TYLENOL) 500 mg, Oral, EVERY 6 HOURS PRN    amiodarone (CORDARONE) 600 mg, Oral, ONCE, Take 3 tablets after 4pm the evening before surgery.     aspirin 81 mg, Oral, DAILY    atorvastatin (LIPITOR) 20 MG tablet TAKE 1 TABLET BY MOUTH EVERY DAY    Cholecalciferol (VITAMIN D) 50 MCG (2000 UT) CAPS capsule Oral    Magnesium 100 MG CAPS Oral    metFORMIN (GLUCOPHAGE) 500 MG tablet TAKE 1 TABLET BY MOUTH TWICE A DAY WITH MEALS    metoprolol succinate (TOPROL XL) 25 mg, Oral, DAILY    naproxen sodium (ANAPROX) 440 mg, Oral, 2 TIMES DAILY WITH MEALS, Holding or surgery- instructed 10/13/22    olmesartan (BENICAR) 20 MG tablet TAKE 1 TABLET BY MOUTH EVERY DAY    omeprazole (PRILOSEC OTC) 20 mg, Oral, DAILY, As needed    pregabalin (LYRICA) 150 mg, Oral, 2 TIMES DAILY    vitamin B-12 (CYANOCOBALAMIN) 1,000 mcg, Oral, DAILY         Past Medical History:   Diagnosis Date    Abnormal finding on EKG 2020    low voltage and incomplete RBBB    Anxiety and depression     Bilateral foot pain     Chronic back pain     Chronic back pain     Coronary artery disease     scheduled for CABG 10/17/22    Diabetes ([facility]     type 2- metformin- A1C 6.7 (10/13/22)- denies hypo episodes    Former smoker, stopped smoking in distant past     quit -  0.5 ppd x 26 years    H/O echocardiogram 2022    Echo LVEF 50-55%    HTN (hypertension)     Hx stress fracture     left foot    Impingement syndrome of right shoulder     Dr. Delroy Hanson    Insomnia     Lumbar spinal stenosis     Periodontal disease due to type 2 diabetes mellitus ([facility]     Dr. Roberts Asp- dentist    Plantar fasciitis, bilateral     RLS (restless legs syndrome)      Past Surgical History:   Procedure Laterality Date    CARDIAC PROCEDURE N/A 2022    Left heart cath / coronary angiography performed by Sybil Anderson MD at [address] [place] [address] CATH LAB    CARDIAC PROCEDURE N/A 2022    Fractional flow reserve (FFR) performed by Sybil Anderson MD at [address] [place] [address] CATH LAB     Social History     Socioeconomic History    Marital status:      Spouse name: Not on file    Number of children: Not on file    Years of education: Not on file    Highest education level: Not on file   Occupational History    Not on file   Tobacco Use    Smoking status: Former     Packs/day: 0.50     Years: 26.00     Pack years: 13.00     Types: Cigarettes     Quit date:      Years since quittin.8    Smokeless tobacco: Never   Vaping Use    Vaping Use: Never used   Substance and Sexual Activity    Alcohol use:  Yes     Alcohol/week: 3.0 - 4.0 standard drinks     Types: 3 - 4 Cans of beer per week    Drug use: No    Sexual activity: Not on file   Other Topics Concern    Not on file Social History Narrative    Not on file     Social Determinants of Health     Financial Resource Strain: Not on file   Food Insecurity: Not on file   Transportation Needs: Not on file   Physical Activity: Not on file   Stress: Not on file   Social Connections: Not on file   Intimate Partner Violence: Not on file   Housing Stability: Not on file     Family History   Problem Relation Age of Onset    Stroke Mother     COPD Father     Coronary Art Dis Father     Diabetes Sister      No Known Allergies  Objective:   Blood pressure (!) 83/48, pulse 66, temperature 98.6 °F (37 °C), temperature source Oral, resp. rate (!) 7, height 5' 6.5\" (1.689 m), weight 180 lb (81.6 kg), SpO2 97 %. Intake/Output Summary (Last 24 hours) at 10/18/2022 0757  Last data filed at 10/18/2022 0715  Gross per 24 hour   Intake 4648. 75 ml   Output 2465 ml   Net 2183.75 ml       Physical Exam:          Constitutional:  Sedated, orally intubated and mechanically ventilated. EENMT:  Sclera clear, pupils equal, oral mucosa moist and orally intubated  Respiratory: ETT in position. Cta b, no w/r/r  Cardiovascular:  RRR. Loud rub present. Gastrointestinal:  soft; absent bowel sounds present  Musculoskeletal:  warm with no cyanosis, no lower extremity edema. [place] site left leg with ace wrap. Sedated with no movements. SKIN:  no jaundice or ecchymosis   Neurologic:  sedated but no gross neuro deficits  Psychiatric:  sedated and unable to assess at this time    CXR:    Scant atelectasis in left base. ETT is since removed. Drains noted          LINES:   mc[place], swan shirley, arterial line, chest tubes times 2 in epigastric area without air leak.     DRIPS:   Dose (mcg/kg/hr) Dexmedetomidine: 0 mcg/kg/hr (Anesthesia infusion automatically stopped by extension.)  Dose (mcg/min) Epinephrine: 0 mcg/min  Dose (mcg/min) Norepinephrine: 0 mcg/min  Dose (mcg/kg/min) Phenylephrine : 0.1332 mcg/kg/min  Dose (units/hr) Insulin : 0.8 Units/hr             Recent Labs     10/17/22  1130 10/17/22  1159 10/17/22  1253   PHAPOC 7.36 7.34* 7.40   TWR0GHUA 40.0 39.8 36.7   HY4MTSD 269* 289* 199*   IYF6GTT 22.4 21.7* 22.9       Recent Labs     10/16/22  2005 10/17/22  1251 10/17/22  1717 10/17/22  2045 10/18/22  0308   WBC 8.7 17.4* 11.7*  --  8.5   HGB 13.4* 11.6* 9.8* 9.6* 8.9*   HCT 39.6* 34.6* 28.8* 28.1* 27.0*    104* 88*  --  83   INR  --  1.4  --   --   --        Recent Labs     10/16/22  2005 10/16/22  2005 10/17/22  1251 10/17/22  1717 10/17/22  2045 10/18/22  0308     --  144*  --   --  145*   K 4.6  --  3.9 4.4 4.3 4.5   *  --  118*  --   --  119*   CO2 23  --  21  --   --  19*   BUN 24*  --  19  --   --  20   CREATININE 1.40  --  1.20  --   --  1.30   MG  --    < > 3.2* 2.5* 2.5* 2.4    < > = values in this interval not displayed. No results for input(s): PROCAL, LACTATE in the last 72 hours. 09/30/22    TRANSTHORACIC ECHOCARDIOGRAM (TTE) COMPLETE (CONTRAST/BUBBLE/3D PRN) 09/30/2022  1:30 PM, 09/30/2022 12:00 AM (Final)    Interpretation Summary    Left Ventricle: Normal left ventricular systolic function with a visually estimated EF of 50 - 55%. Left ventricle size is normal. Normal wall thickness. Normal wall motion. Normal diastolic function. Aortic Valve: Valve structure is normal.    Mitral Valve: Mild regurgitation with a centrally directed jet. Tricuspid Valve: The estimated RVSP is 26 mmHg. Technical qualifiers: Color flow Doppler was performed and pulse wave and/or continuous wave Doppler was performed. Contrast used: Definity. Signed by: Malik Mosquera MD on 9/30/2022  1:30 PM, Signed by: Unknown Provider Result on 9/30/2022 12:00 AM    Assessment and Plan :  (Medical Decision Making)   Impression: 60 y.o. y/o male s/p CV surgery for CAD in native artery    Encounter for weaning from Ventilator:  --off vent and oxygen at thist teressa. Hypoxemia:   --off oxygen.      Atelectasis:   --continue IS and mobilize    S/P Cardiovascular surgery:  S/P CABG x 4  --Monitor chest tube output, removal per surgery. Anemia  --noted lower today and would f/u. Transfuse per PMD    Hypotension  --still on small dose of pressors. If persists then consider transfusion. More than 50% of the time documented was spent in face-to-face contact with the patient and in the care of the patient on the floor/unit where the patient is located.               Corrinne Dome, MD

## 2022-10-19 ENCOUNTER — APPOINTMENT (OUTPATIENT)
Dept: GENERAL RADIOLOGY | Age: 62
DRG: 236 | End: 2022-10-19
Attending: THORACIC SURGERY (CARDIOTHORACIC VASCULAR SURGERY)
Payer: COMMERCIAL

## 2022-10-19 LAB
ANION GAP SERPL CALC-SCNC: 5 MMOL/L (ref 2–11)
BUN SERPL-MCNC: 21 MG/DL (ref 8–23)
CALCIUM SERPL-MCNC: 8.3 MG/DL (ref 8.3–10.4)
CHLORIDE SERPL-SCNC: 115 MMOL/L (ref 101–110)
CO2 SERPL-SCNC: 21 MMOL/L (ref 21–32)
CREAT SERPL-MCNC: 1.2 MG/DL (ref 0.8–1.5)
ERYTHROCYTE [DISTWIDTH] IN BLOOD BY AUTOMATED COUNT: 15.3 % (ref 11.9–14.6)
GLUCOSE BLD STRIP.AUTO-MCNC: 136 MG/DL (ref 65–100)
GLUCOSE BLD STRIP.AUTO-MCNC: 147 MG/DL (ref 65–100)
GLUCOSE BLD STRIP.AUTO-MCNC: 162 MG/DL (ref 65–100)
GLUCOSE BLD STRIP.AUTO-MCNC: 167 MG/DL (ref 65–100)
GLUCOSE SERPL-MCNC: 150 MG/DL (ref 65–100)
HCT VFR BLD AUTO: 30.3 % (ref 41.1–50.3)
HGB BLD-MCNC: 9.7 G/DL (ref 13.6–17.2)
MAGNESIUM SERPL-MCNC: 2.4 MG/DL (ref 1.8–2.4)
MCH RBC QN AUTO: 30 PG (ref 26.1–32.9)
MCHC RBC AUTO-ENTMCNC: 32 G/DL (ref 31.4–35)
MCV RBC AUTO: 93.8 FL (ref 82–102)
MM INDURATION, POC: 0 MM (ref 0–5)
NRBC # BLD: 0 K/UL (ref 0–0.2)
PLATELET # BLD AUTO: 94 K/UL (ref 150–450)
PMV BLD AUTO: 14.3 FL (ref 9.4–12.3)
POTASSIUM SERPL-SCNC: 4.5 MMOL/L (ref 3.5–5.1)
PPD, POC: NEGATIVE
RBC # BLD AUTO: 3.23 M/UL (ref 4.23–5.6)
SERVICE CMNT-IMP: ABNORMAL
SODIUM SERPL-SCNC: 141 MMOL/L (ref 133–143)
WBC # BLD AUTO: 13.5 K/UL (ref 4.3–11.1)

## 2022-10-19 PROCEDURE — 6370000000 HC RX 637 (ALT 250 FOR IP): Performed by: THORACIC SURGERY (CARDIOTHORACIC VASCULAR SURGERY)

## 2022-10-19 PROCEDURE — 2140000001 HC CVICU INTERMEDIATE R&B

## 2022-10-19 PROCEDURE — 2580000003 HC RX 258: Performed by: THORACIC SURGERY (CARDIOTHORACIC VASCULAR SURGERY)

## 2022-10-19 PROCEDURE — 36415 COLL VENOUS BLD VENIPUNCTURE: CPT

## 2022-10-19 PROCEDURE — 97110 THERAPEUTIC EXERCISES: CPT

## 2022-10-19 PROCEDURE — 80048 BASIC METABOLIC PNL TOTAL CA: CPT

## 2022-10-19 PROCEDURE — 99024 POSTOP FOLLOW-UP VISIT: CPT | Performed by: PHYSICIAN ASSISTANT

## 2022-10-19 PROCEDURE — 97530 THERAPEUTIC ACTIVITIES: CPT

## 2022-10-19 PROCEDURE — 85027 COMPLETE CBC AUTOMATED: CPT

## 2022-10-19 PROCEDURE — 6370000000 HC RX 637 (ALT 250 FOR IP): Performed by: PHYSICIAN ASSISTANT

## 2022-10-19 PROCEDURE — 82962 GLUCOSE BLOOD TEST: CPT

## 2022-10-19 PROCEDURE — 6360000002 HC RX W HCPCS: Performed by: THORACIC SURGERY (CARDIOTHORACIC VASCULAR SURGERY)

## 2022-10-19 PROCEDURE — 99232 SBSQ HOSP IP/OBS MODERATE 35: CPT | Performed by: INTERNAL MEDICINE

## 2022-10-19 PROCEDURE — 71046 X-RAY EXAM CHEST 2 VIEWS: CPT

## 2022-10-19 PROCEDURE — 83735 ASSAY OF MAGNESIUM: CPT

## 2022-10-19 RX ADMIN — ONDANSETRON 4 MG: 2 INJECTION INTRAMUSCULAR; INTRAVENOUS at 17:30

## 2022-10-19 RX ADMIN — INSULIN LISPRO 1 UNITS: 100 INJECTION, SOLUTION INTRAVENOUS; SUBCUTANEOUS at 21:15

## 2022-10-19 RX ADMIN — OXYCODONE AND ACETAMINOPHEN 1 TABLET: 5; 325 TABLET ORAL at 18:32

## 2022-10-19 RX ADMIN — SODIUM CHLORIDE, PRESERVATIVE FREE 10 ML: 5 INJECTION INTRAVENOUS at 08:16

## 2022-10-19 RX ADMIN — FAMOTIDINE 20 MG: 20 TABLET, FILM COATED ORAL at 20:14

## 2022-10-19 RX ADMIN — TRAMADOL HYDROCHLORIDE 100 MG: 50 TABLET, COATED ORAL at 12:33

## 2022-10-19 RX ADMIN — SENNOSIDES AND DOCUSATE SODIUM 2 TABLET: 8.6; 5 TABLET ORAL at 08:12

## 2022-10-19 RX ADMIN — AMIODARONE HYDROCHLORIDE 200 MG: 200 TABLET ORAL at 20:14

## 2022-10-19 RX ADMIN — SODIUM CHLORIDE, PRESERVATIVE FREE 10 ML: 5 INJECTION INTRAVENOUS at 20:15

## 2022-10-19 RX ADMIN — SENNOSIDES AND DOCUSATE SODIUM 2 TABLET: 8.6; 5 TABLET ORAL at 20:14

## 2022-10-19 RX ADMIN — ATORVASTATIN CALCIUM 80 MG: 80 TABLET, FILM COATED ORAL at 20:14

## 2022-10-19 RX ADMIN — FUROSEMIDE 40 MG: 40 TABLET ORAL at 08:12

## 2022-10-19 RX ADMIN — OXYCODONE AND ACETAMINOPHEN 1 TABLET: 5; 325 TABLET ORAL at 08:14

## 2022-10-19 RX ADMIN — FAMOTIDINE 20 MG: 20 TABLET, FILM COATED ORAL at 08:12

## 2022-10-19 RX ADMIN — Medication 1 AMPULE: at 20:14

## 2022-10-19 RX ADMIN — OXYCODONE AND ACETAMINOPHEN 1 TABLET: 5; 325 TABLET ORAL at 03:27

## 2022-10-19 RX ADMIN — Medication 1 AMPULE: at 08:12

## 2022-10-19 RX ADMIN — AMIODARONE HYDROCHLORIDE 200 MG: 200 TABLET ORAL at 08:12

## 2022-10-19 RX ADMIN — POTASSIUM CHLORIDE 10 MEQ: 750 TABLET, EXTENDED RELEASE ORAL at 08:12

## 2022-10-19 ASSESSMENT — PAIN DESCRIPTION - ORIENTATION
ORIENTATION: ANTERIOR
ORIENTATION: MID

## 2022-10-19 ASSESSMENT — PAIN - FUNCTIONAL ASSESSMENT
PAIN_FUNCTIONAL_ASSESSMENT: ACTIVITIES ARE NOT PREVENTED

## 2022-10-19 ASSESSMENT — PAIN DESCRIPTION - FREQUENCY
FREQUENCY: INTERMITTENT

## 2022-10-19 ASSESSMENT — PAIN DESCRIPTION - PAIN TYPE
TYPE: SURGICAL PAIN

## 2022-10-19 ASSESSMENT — PAIN SCALES - GENERAL
PAINLEVEL_OUTOF10: 7
PAINLEVEL_OUTOF10: 10
PAINLEVEL_OUTOF10: 5
PAINLEVEL_OUTOF10: 9
PAINLEVEL_OUTOF10: 3
PAINLEVEL_OUTOF10: 2
PAINLEVEL_OUTOF10: 2
PAINLEVEL_OUTOF10: 3

## 2022-10-19 ASSESSMENT — PAIN DESCRIPTION - LOCATION
LOCATION: CHEST
LOCATION: STERNUM

## 2022-10-19 ASSESSMENT — PAIN DESCRIPTION - DESCRIPTORS
DESCRIPTORS: ACHING

## 2022-10-19 ASSESSMENT — PAIN DESCRIPTION - ONSET
ONSET: ON-GOING

## 2022-10-19 NOTE — PROGRESS NOTES
TRANSFER - IN REPORT:    Verbal report received from Leti Barillas real5D on Markel Zimmerman.  being received from CVICU for routine progression of patient care      Report consisted of patients Situation, Background, Assessment and   Recommendations(SBAR). Information from the following report(s) Nurse Handoff Report, MAR, and Cardiac Rhythm Sinus  was reviewed with the receiving nurse. Opportunity for questions and clarification was provided.

## 2022-10-19 NOTE — PROGRESS NOTES
ACUTE PHYSICAL THERAPY GOALS:   (Developed with and agreed upon by patient and/or caregiver. )  LTG:  (1.)Mr. Estevez will move from supine to sit and sit to supine , scoot up and down and roll side to side in flat bed without siderails with  INDEPENDENT within 7 day(s). (2.)Mr. Estevez will perform all functional transfers with  INDEPENDENT using  no device within 7 day(s). (3.)Mr. Estevez will ambulate with  INDEPENDENT for 750+ feet with normal vital sign response with no device within 7 day(s). (4.)Mr. Estevez will ambulate up/down 2 steps with bilateral railing with  MODIFIED INDEPENDENCE with no device within  7  day(s). PHYSICAL THERAPY: Daily Note AM   (Link to Caseload Tracking: PT Visit Days : 1  Time In/Out PT Charge Capture  Rehab Caseload Tracker  Orders    Domonique Navarro is a 60 y.o. male   PRIMARY DIAGNOSIS: S/P CABG x 4  Coronary atherosclerosis of native coronary artery [I25.10]  Abnormal cardiovascular stress test [R94.39]  Dyspnea [R06.00]  CAD in native artery [I25.10]  Procedure(s) (LRB):  CABG CORONARY ARTERY BYPASS (CABG X4) LIMA / ENDOSCOPIC VEIN HARVEST - GREATER SAPHENOUS VEIN (N/A)  TRANSESOPHAGEAL ECHOCARDIOGRAM (N/A)  2 Days Post-Op  Inpatient: Payor: [facility] / Plan: [facility] / Product Type: *No Product type* /     ASSESSMENT:     REHAB RECOMMENDATIONS:   Recommendation to date pending progress:  Setting:  Home Health Therapy    Equipment:    None     ASSESSMENT:  Mr. Saurabh Dave is sitting in the recliner and agreeable to therapy. He requires just a little assist to stand. Gait training with rolling walker x 300 feet with good rochelle. Patient is returned to the recliner and after a rest break he is instructed in therapeutic exercises. Tolerated well. Good session with progress demonstrated. Continue PT efforts.   HHPT at d.c.     SUBJECTIVE:   Mr. Saurabh Dave states, \"good morning\"     Social/Functional Lives With: Alone  Type of Home: House  Home Layout: One level  Home Access: Stairs to enter without rails  Entrance Stairs - Number of Steps: 1  Ambulation Assistance: Independent  Transfer Assistance: Independent  Active : Yes  Occupation: Full time employment  OBJECTIVE:     PAIN: VITALS / O2: PRECAUTION / Richard Chester / Barry Jurist:   Pre Treatment: 0/10         Post Treatment: 0/10   Just had pain medication Vitals        Oxygen    Telemetry     RESTRICTIONS/PRECAUTIONS:        MOBILITY: I Mod I S SBA CGA Min Mod Max Total  NT x2 Comments:   Bed Mobility    Rolling [] [] [] [] [] [] [] [] [] [x] []    Supine to Sit [] [] [] [] [] [] [] [] [] [x] []    Scooting [] [] [] [] [] [] [] [] [] [x] []    Sit to Supine [] [] [] [] [] [] [] [] [] [x] []    Transfers    Sit to Stand [] [] [] [x] [] [] [] [] [] [] []    Bed to Chair [] [] [] [] [] [] [] [] [] [x] []    Stand to Sit [] [] [] [x] [] [] [] [] [] [] []     [] [] [] [] [] [] [] [] [] [] []    I=Independent, Mod I=Modified Independent, S=Supervision, SBA=Standby Assistance, CGA=Contact Guard Assistance,   Min=Minimal Assistance, Mod=Moderate Assistance, Max=Maximal Assistance, Total=Total Assistance, NT=Not Tested    BALANCE: Good Fair+ Fair Fair- Poor NT Comments   Sitting Static [x] [] [] [] [] []    Sitting Dynamic [x] [] [] [] [] []              Standing Static [x] [] [] [] [] []    Standing Dynamic [x] [] [] [] [] []      GAIT: I Mod I S SBA CGA Min Mod Max Total  NT x2 Comments:   Level of Assistance [] [] [x] [] [] [] [] [] [] [] []    Distance   300 feet    DME Rolling Walker    Gait Quality slow    Weightbearing Status      Stairs      I=Independent, Mod I=Modified Independent, S=Supervision, SBA=Standby Assistance, CGA=Contact Guard Assistance,   Min=Minimal Assistance, Mod=Moderate Assistance, Max=Maximal Assistance, Total=Total Assistance, NT=Not Tested    PLAN:   FREQUENCY AND DURATION: BID for duration of hospital stay or until stated goals are met, whichever comes first.    TREATMENT:   TREATMENT:   Therapeutic Activity (16 Minutes): Therapeutic activity included Transfer Training, Ambulation on level ground, Sitting balance , and Standing balance to improve functional Activity tolerance, Balance, Coordination, Mobility, and Strength. Therapeutic Exercise (12 Minutes): Therapeutic exercises noted below to improve functional activity tolerance, AROM, strength, and mobility.      TREATMENT GRID:     Date:  10/19/22 Date:   Date:     ACTIVITY/EXERCISE AM PM AM PM AM PM   LAQ 15        Shoulder shrugs 15        Gluteal sets 15        marching 15        Ankle pumps 15        abduction 15                 B = bilateral; AA = active assistive; A = active; P = passive    AFTER TREATMENT PRECAUTIONS: Bed/Chair Locked, Call light within reach, Chair, Needs within reach, and RN notified    INTERDISCIPLINARY COLLABORATION:  RN/ PCT and PT/ PTA    EDUCATION:  review POC and importance of mobility in the recovery process    TIME IN/OUT:  Time In: 8712  Time Out: 1461  Minutes: 24[facility] [place], PTA

## 2022-10-19 NOTE — CARE COORDINATION
This CM met with pt and his sister this day at bedside to complete assessment. Pt verified his PCP, insurance, emergency contact, and home address. He reports no difficulty obtaining his medications in the community. He lives at home alone with a small step to enter and no home DME. He confirms that at baseline prior to admission he is independent with his ADLs including bathing, dressing, cooking, and driving. We discussed discharge planning. Pt plans on returning home when medically stable. His sister is going to be staying with him at his home to assist in recovery at home. We reviewed the role and recommendation of home health at discharge - he is agreeable to referral.  Reviewed agencies - [facility] referral.     No additional CM needs at this time. Will continue to monitor and update as needed. 10/17/22 1324   Service Assessment   Patient Orientation Alert and Oriented   Cognition Alert   History Provided By Patient   Primary Caregiver Self   Support Systems Family Members   PCP Verified by CM Yes   Last Visit to PCP Within last 3 months   Prior Functional Level Independent in ADLs/IADLs   Current Functional Level Other (see comment)  (TBD by clinical team)   Can patient return to prior living arrangement Yes   Ability to make needs known: Good   Family able to assist with home care needs: Yes   Social/Functional History   Lives With Alone   Type of Home House   Discharge Planning   Type of [address]   DME Ordered?  No   Type of Home Care Services PT;Nursing Services   Patient expects to be discharged to: [facility] Discharge   Confirm Follow Up Transport Family   Condition of Participation: Discharge Planning   The Plan for Transition of Care is related to the following treatment goals: Home with family support and home health   The Patient and/or Patient Representative was provided with a Choice of Provider? Patient   The Patient and/Or Patient Representative agree with the Discharge Plan? Yes   Freedom of Choice list was provided with basic dialogue that supports the patient's individualized plan of care/goals, treatment preferences, and shares the quality data associated with the providers?   Yes

## 2022-10-19 NOTE — PROGRESS NOTES
Pacing wires pulled per and by [employer] PA. Patient instructed to one hour bedrest with every 15 minute blood pressure checks for one hour. Pt voices understanding.

## 2022-10-19 NOTE — PROGRESS NOTES
Physician Progress Note      Sun Albarado  [facility] #:                  [id number]  :                       1960  ADMIT DATE:       10/17/2022 4:56 AM  DISCH DATE:  RESPONDING  PROVIDER #:        Michael Arriaga MD        QUERY TEXT:    Type of Anemia: Please provide further specificity, if known. Clinical indicators include: vitamin b-12, 8 units, hgb, hct, anemia,   transfuse, transfusion  Options provided:  -- Anemia due to acute blood loss  -- Anemia due to chronic blood loss  -- Anemia due to iron deficiency  -- Anemia due to postoperative blood loss  -- Anemia due to chronic disease  -- Other - I will add my own diagnosis  -- Disagree - Not applicable / Not valid  -- Disagree - Clinically Unable to determine / Unknown        PROVIDER RESPONSE TEXT:    Provider dismissed this query because it was not applicable to the patient or   not a valid query. Please talk to CV surgery -- it is there patient. Thanks          QUERY TEXT:    Pt admitted for CABG. If possible, please document in the progress notes and   discharge summary if you are evaluating and/or treating any of the following: The medical record reflects the following:  Risk Factors: CABGx4  Clinical Indicators: HGB 13.4>8.9  RN PN states \"Up to walk 25 feet with walker doing well. Back to recliner with   200ml dump f serosangeous blood. BP in 70s\"  Treatment: pressors, ICU management, possible transfusion  Options provided:  -- Cardiogenic Shock  -- Hemorrhagic Shock  -- Hypovolemic Shock  -- Other - I will add my own diagnosis  -- Disagree - Not applicable / Not valid  -- Disagree - Clinically unable to determine / Unknown  -- Refer to Clinical Documentation Reviewer    PROVIDER RESPONSE TEXT:    Provider is clinically unable to determine a response to this query. please talk to PMD -- CV surgery to clarify. it is there patient.  Thanks    Query created by: Catalina Chavira on 10/18/2022 9:50 AM      Electronically signed by:  Dayan Browning MD 10/19/2022 9:25 AM

## 2022-10-19 NOTE — PLAN OF CARE
Problem: Chronic Conditions and Co-morbidities  Goal: Patient's chronic conditions and co-morbidity symptoms are monitored and maintained or improved  Outcome: Progressing  Flowsheets (Taken 10/19/2022 0814)  Care Plan - Patient's Chronic Conditions and Co-Morbidity Symptoms are Monitored and Maintained or Improved: Monitor and assess patient's chronic conditions and comorbid symptoms for stability, deterioration, or improvement     Problem: Discharge Planning  Goal: Discharge to home or other facility with appropriate resources  Recent Flowsheet Documentation  Taken 10/19/2022 0814 by Pablo Nevarez RN  Discharge to home or other facility with appropriate resources: Identify barriers to discharge with patient and caregiver     Problem: Pain  Goal: Verbalizes/displays adequate comfort level or baseline comfort level  Recent Flowsheet Documentation  Taken 10/19/2022 0910 by Pablo Nevarez RN  Verbalizes/displays adequate comfort level or baseline comfort level: Encourage patient to monitor pain and request assistance

## 2022-10-19 NOTE — PROGRESS NOTES
ACUTE PHYSICAL THERAPY GOALS:   (Developed with and agreed upon by patient and/or caregiver. )  LTG:  (1.)Mr. Estevez will move from supine to sit and sit to supine , scoot up and down and roll side to side in flat bed without siderails with  INDEPENDENT within 7 day(s). (2.)Mr. Estevez will perform all functional transfers with  INDEPENDENT using  no device within 7 day(s). (3.)Mr. Estevez will ambulate with  INDEPENDENT for 750+ feet with normal vital sign response with no device within 7 day(s). (4.)Mr. Estevez will ambulate up/down 2 steps with bilateral railing with  MODIFIED INDEPENDENCE with no device within  7  day(s). PHYSICAL THERAPY: Daily Note PM   (Link to Caseload Tracking: PT Visit Days : 2  Time In/Out PT Charge Capture  Rehab Caseload Tracker  Orders    Darrell Cortes is a 60 y.o. male   PRIMARY DIAGNOSIS: S/P CABG x 4  Coronary atherosclerosis of native coronary artery [I25.10]  Abnormal cardiovascular stress test [R94.39]  Dyspnea [R06.00]  CAD in native artery [I25.10]  Procedure(s) (LRB):  CABG CORONARY ARTERY BYPASS (CABG X4) LIMA / ENDOSCOPIC VEIN HARVEST - GREATER SAPHENOUS VEIN (N/A)  TRANSESOPHAGEAL ECHOCARDIOGRAM (N/A)  2 Days Post-Op  Inpatient: Payor: [facility] / Plan: [facility] / Product Type: *No Product type* /     ASSESSMENT:     REHAB RECOMMENDATIONS:   Recommendation to date pending progress:  Setting:  Home Health Therapy    Equipment:    None     ASSESSMENT:  Mr. Gina Reinoso is sitting in the recliner and agreeable to therapy. He requires just a little assist to stand. Gait training with rolling walker x 300 feet with good rochelle. Patient is returned to the recliner and after a rest break he is instructed in therapeutic exercises. Tolerated well. Good session with progress demonstrated. Continue PT efforts. HHPT at d.c.    PM note:  Patient is agreeable. Sister at bedside.   He is able to increase his gait distance no AD used, stairs and exercises continued. Making good progress towards goals. Really nice man.       SUBJECTIVE:   Mr. Graciela Mensah states, \"I'm ready\"     Social/Functional Lives With: Alone  Type of Home: House  Home Layout: One level  Home Access: Stairs to enter without rails  Entrance Stairs - Number of Steps: 1  Ambulation Assistance: Independent  Transfer Assistance: Independent  Active : Yes  Occupation: Full time employment  OBJECTIVE:     PAIN: VITALS / O2: Jean-Pierre Valdez / Tristan Nolasco / Matt Papa:   Pre Treatment: 0/10         Post Treatment: 0/10   Just had pain medication Vitals        Oxygen    Telemetry     RESTRICTIONS/PRECAUTIONS:        MOBILITY: I Mod I S SBA CGA Min Mod Max Total  NT x2 Comments:   Bed Mobility    Rolling [] [] [] [] [] [] [] [] [] [x] []    Supine to Sit [] [] [] [] [] [] [] [] [] [x] []    Scooting [] [] [] [] [] [] [] [] [] [x] []    Sit to Supine [] [] [] [] [] [] [] [] [] [x] []    Transfers    Sit to Stand [] [] [] [x] [] [] [] [] [] [] []    Bed to Chair [] [] [] [] [] [] [] [] [] [x] []    Stand to Sit [] [] [] [x] [] [] [] [] [] [] []     [] [] [] [] [] [] [] [] [] [] []    I=Independent, Mod I=Modified Independent, S=Supervision, SBA=Standby Assistance, CGA=Contact Guard Assistance,   Min=Minimal Assistance, Mod=Moderate Assistance, Max=Maximal Assistance, Total=Total Assistance, NT=Not Tested    BALANCE: Good Fair+ Fair Fair- Poor NT Comments   Sitting Static [x] [] [] [] [] []    Sitting Dynamic [x] [] [] [] [] []              Standing Static [x] [] [] [] [] []    Standing Dynamic [x] [] [] [] [] []      GAIT: I Mod I S SBA CGA Min Mod Max Total  NT x2 Comments:   Level of Assistance [] [] [x] [] [] [] [] [] [] [] []    Distance   350 feet    DME None    Gait Quality slow    Weightbearing Status      Stairs      I=Independent, Mod I=Modified Independent, S=Supervision, SBA=Standby Assistance, CGA=Contact Guard Assistance,   Min=Minimal Assistance, Mod=Moderate Assistance, Max=Maximal Assistance, Total=Total Assistance, NT=Not Tested    PLAN:   FREQUENCY AND DURATION: BID for duration of hospital stay or until stated goals are met, whichever comes first.    TREATMENT:   TREATMENT:   Therapeutic Activity (16 Minutes): Therapeutic activity included Transfer Training, Ambulation on level ground, Sitting balance , and Standing balance to improve functional Activity tolerance, Balance, Coordination, Mobility, and Strength. Therapeutic Exercise (10 Minutes): Therapeutic exercises noted below to improve functional activity tolerance, AROM, strength, and mobility.      TREATMENT GRID:     Date:  10/19/22 Date:   Date:     ACTIVITY/EXERCISE AM PM AM PM AM PM   LAQ 15 15       Shoulder shrugs 15 15       Gluteal sets 15 15       marching 15 15       Ankle pumps 15 15       abduction 15 15                B = bilateral; AA = active assistive; A = active; P = passive    AFTER TREATMENT PRECAUTIONS: Bed/Chair Locked, Call light within reach, Chair, Needs within reach, RN notified, and Visitors at bedside    INTERDISCIPLINARY COLLABORATION:  RN/ PCT and PT/ PTA    EDUCATION:  review POC and importance of mobility in the recovery process    TIME IN/OUT:  Time In: 1310  Time Out: [address]  Minutes: 32    Marj Thorne PTA

## 2022-10-19 NOTE — PROGRESS NOTES
Rashad Brito. Admission Date: 10/17/2022         Daily Progress Note: 10/19/2022    The patient's chart is reviewed and the patient is discussed with the staff. Background: Patient is seen at the request of Flora Fitzgerald MD  for respiratory management status post cardiac surgery. Patient had CABG x 4. Currently is sedated in CV-ICU and orally intubated receiving  mechanical ventilation. We have been asked to see in the CV-ICU for mechanical ventilation management and weaning. He has a history of HTN, DM, HLD. He was seen by cardiology and reported FERRER and was found to have an elevated calcium score. Treadmill stress test showed EKG changes c/w ischemia and LHC showed severe mvCAD. There is no mention in the chart of underlying lung disease. The patient's chart is reviewed and the patient is discussed with the staff. Subjective:     Pt was transferred to stepdown floor after weaning off pressors. Currently on room air. Pt denies any new complaints.          Current Facility-Administered Medications   Medication Dose Route Frequency    sodium chloride flush 0.9 % injection 5-40 mL  5-40 mL IntraVENous 2 times per day    sodium chloride flush 0.9 % injection 5-40 mL  5-40 mL IntraVENous PRN    ondansetron (ZOFRAN-ODT) disintegrating tablet 4 mg  4 mg Oral Q8H PRN    Or    ondansetron (ZOFRAN) injection 4 mg  4 mg IntraVENous Q6H PRN    acetaminophen (TYLENOL) tablet 650 mg  650 mg Oral Q4H PRN    furosemide (LASIX) tablet 40 mg  40 mg Oral Daily    potassium chloride (KLOR-CON M) extended release tablet 10 mEq  10 mEq Oral Daily    polyethylene glycol (GLYCOLAX) packet 17 g  17 g Oral Daily PRN    magnesium hydroxide (MILK OF MAGNESIA) 400 MG/5ML suspension 30 mL  30 mL Oral Daily PRN    famotidine (PEPCID) tablet 20 mg  20 mg Oral BID    Or    famotidine (PEPCID) 20 mg in sodium chloride (PF) 10 mL injection  20 mg IntraVENous BID    glucose chewable tablet 16 g  4 tablet Oral PRN    dextrose bolus 10% 125 mL  125 mL IntraVENous PRN    Or    dextrose bolus 10% 250 mL  250 mL IntraVENous PRN    dextrose 10 % infusion   IntraVENous Continuous PRN    alcohol 62% (NOZIN) nasal  1 ampule  1 ampule Topical Q12H    traMADol (ULTRAM) tablet 100 mg  100 mg Oral Q6H PRN    sennosides-docusate sodium (SENOKOT-S) 8.6-50 MG tablet 2 tablet  2 tablet Oral BID    bisacodyl (DULCOLAX) EC tablet 5 mg  5 mg Oral Daily PRN    bisacodyl (DULCOLAX) suppository 10 mg  10 mg Rectal Daily PRN    potassium chloride (KLOR-CON M) extended release tablet 20 mEq  20 mEq Oral BID PRN    potassium chloride (KLOR-CON M) extended release tablet 40 mEq  40 mEq Oral BID PRN    magnesium oxide (MAG-OX) tablet 400 mg  400 mg Oral TID PRN    magnesium oxide (MAG-OX) tablet 400 mg  400 mg Oral 4x Daily PRN    oxyCODONE-acetaminophen (PERCOCET) 5-325 MG per tablet 1 tablet  1 tablet Oral Q4H PRN    dextrose 5 % and 0.45 % NaCl with KCl 20 mEq infusion   IntraVENous Continuous    [Held by provider] aspirin EC tablet 81 mg  81 mg Oral Daily    amiodarone (CORDARONE) tablet 200 mg  200 mg Oral BID    [Held by provider] metoprolol tartrate (LOPRESSOR) tablet 25 mg  25 mg Oral BID    atorvastatin (LIPITOR) tablet 80 mg  80 mg Oral Nightly    insulin regular (HUMULIN R;NOVOLIN R) 100 Units in sodium chloride 0.9 % 100 mL infusion  1-10 Units/hr IntraVENous TITRATE    insulin lispro (HUMALOG) injection vial 0-12 Units  0-12 Units SubCUTAneous TID WC    insulin lispro (HUMALOG) injection vial 0-6 Units  0-6 Units SubCUTAneous Nightly     Review of Systems: Comprehensive ROS negative except in HPI  Objective:   Blood pressure 137/61, pulse 74, temperature 97.3 °F (36.3 °C), temperature source Temporal, resp. rate 16, height 5' 6.5\" (1.689 m), weight 179 lb 9.6 oz (81.5 kg), SpO2 95 %.    Intake/Output Summary (Last 24 hours) at 10/19/2022 1457  Last data filed at 10/19/2022 1445  Gross per 24 hour   Intake 360 ml Output 1500 ml   Net -1140 ml     Physical Exam:   Constitutional:  the patient is well developed and in no acute distress  EENMT:  Sclera clear, pupils equal, oral mucosa moist  Respiratory: symmetric chest rise. Cta b, no w/r/r  Cardiovascular:  RRR without M,G,R. There is no lower extremity edema. Gastrointestinal: soft and non-tender; with positive bowel sounds. Musculoskeletal: warm without cyanosis. Normal muscle tone. Skin:  no jaundice or rashes, surgical wounds   Neurologic: symmetric strength, fluent speech  Psychiatric:  calm, appropriate, oriented x 4    Imaging: I performed an independent interpretation of the patient's images. CXR: 10/19/22      LAB:  Recent Labs     10/17/22  1251 10/17/22  1717 10/17/22  2045 10/18/22  0308 10/19/22  0633   WBC 17.4* 11.7*  --  8.5 13.5*   HGB 11.6* 9.8* 9.6* 8.9* 9.7*   HCT 34.6* 28.8* 28.1* 27.0* 30.3*   * 88*  --  83 94*   INR 1.4  --   --   --   --      Recent Labs     10/16/22  2005 10/17/22  1251 10/17/22  1717 10/17/22  2045 10/18/22  0308 10/19/22  0633    144*  --   --  145* 141   K 4.6 3.9   < > 4.3 4.5 4.5   * 118*  --   --  119* 115*   CO2 23 21  --   --  19* 21   BUN 24* 19  --   --  20 21   CREATININE 1.40 1.20  --   --  1.30 1.20   MG  --  3.2*   < > 2.5* 2.4 2.4   BILITOT 0.6  --   --   --   --   --    AST 45*  --   --   --   --   --    ALT 77*  --   --   --   --   --    ALKPHOS 100  --   --   --   --   --     < > = values in this interval not displayed. No results for input(s): TROPHS, NTPROBNP, CRP, ESR in the last 72 hours. Recent Labs     10/17/22  1251 10/18/22  0308 10/19/22  0633   GLUCOSE 123* 100 150*      Microbiology:   No results for input(s): CULTURE in the last 72 hours.   ECHO: 09/30/22    TRANSTHORACIC ECHOCARDIOGRAM (TTE) COMPLETE (CONTRAST/BUBBLE/3D PRN) 09/30/2022  1:30 PM, 09/30/2022 12:00 AM (Final)    Interpretation Summary    Left Ventricle: Normal left ventricular systolic function with a visually estimated EF of 50 - 55%. Left ventricle size is normal. Normal wall thickness. Normal wall motion. Normal diastolic function. Aortic Valve: Valve structure is normal.    Mitral Valve: Mild regurgitation with a centrally directed jet. Tricuspid Valve: The estimated RVSP is 26 mmHg. Technical qualifiers: Color flow Doppler was performed and pulse wave and/or continuous wave Doppler was performed. Contrast used: Definity. Signed by: To Voss MD on 9/30/2022  1:30 PM, Signed by: Unknown Provider Result on 9/30/2022 12:00 AM    Assessment and Plan:  (Medical Decision Making)   Principal Problem:    S/P CABG x 4  Plan: Pt seems to be progressing well. Now on room air. CXR only with small pleural effusions. No additional pulmonary input at this time. We will sign off but please let us know if new issues arise. More than 50% of the time documented was spent in face-to-face contact with the patient and in the care of the patient on the floor/unit where the patient is located.     Eufemia Chakraborty MD

## 2022-10-19 NOTE — PROGRESS NOTES
Today's Date: 10/19/2022  Date of Admission: 10/17/2022    Chart Reviewed. Subjective:     Patient feels sore. Appetite poor this morning but doesn't always eat breakfast at home. Medications Reviewed. Objective:     Vitals:    10/18/22 2326 10/19/22 0315 10/19/22 0515 10/19/22 0709   BP: (!) 129/58 119/63  (!) 113/54   Pulse: 84 82  75   Resp: 18 16  16   Temp: 99 °F (37.2 °C) 100 °F (37.8 °C)  97 °F (36.1 °C)   TempSrc: Oral Oral  Temporal   SpO2: 95% 96%  96%   Weight:   179 lb 9.6 oz (81.5 kg)    Height:           Intake and Output  Current Shift: No intake/output data recorded. Last 3 Shifts: 10/17 1901 - 10/19 0700  In: 3288.8 [P.O.:1200; I.V.:2088.8]  Out: 1920 [Urine:1480]    Physical Exam:  General: Well Developed, Well Nourished, No Acute Distress, Alert & Oriented x 3, Appropriate mood  Neck: supple, no JVD  Heart: S1S2 with RRR without murmurs or gallops  Lungs: mostly clear throughout auscultation bilaterally  Abd: soft, nontender, nondistended, with good bowel sounds  Ext: no edema bilaterally  Sternal incision: clean, dry, and intact  Skin: warm and dry    LABS  Data Review:   Recent Labs     10/17/22  1251 10/17/22  1717 10/18/22  0308 10/19/22  0633   *  --  145* 141   K 3.9   < > 4.5 4.5   MG 3.2*   < > 2.4 2.4   BUN 19  --  20 21   WBC 17.4*   < > 8.5 13.5*   HGB 11.6*   < > 8.9* 9.7*   HCT 34.6*   < > 27.0* 30.3*   *   < > 83 94*   INR 1.4  --   --   --     < > = values in this interval not displayed. Estimated Creatinine Clearance: 64 mL/min (based on SCr of 1.2 mg/dL).       Assessment/Plan:     *S/P CABG x 4  Holding ASA due to thrombocytopenia, holding BB due to hypotension, BP improved today, continue statin, stable on room air, continue PT     Active Hospital Problems    CAD in native artery  S/p CABG        Encounter for weaning from ventilator [facility])       CAD, multiple vessel  S/p CABG       Abnormal stress test  S/p CABG       Dyspnea Gastroesophageal reflux disease without esophagitis  Continue Pepcid       Controlled type 2 diabetes mellitus without complication, without long-term current use of insulin (HCC)  SSI, on Metformin at home        Essential hypertension  BP low, holding meds       Zuleyma Shook PA-C

## 2022-10-20 LAB
ABO + RH BLD: NORMAL
ANION GAP SERPL CALC-SCNC: 3 MMOL/L (ref 2–11)
BLD PROD TYP BPU: NORMAL
BLD PROD TYP BPU: NORMAL
BLOOD BANK DISPENSE STATUS: NORMAL
BLOOD BANK DISPENSE STATUS: NORMAL
BLOOD GROUP ANTIBODIES SERPL: NORMAL
BPU ID: NORMAL
BPU ID: NORMAL
BUN SERPL-MCNC: 17 MG/DL (ref 8–23)
CALCIUM SERPL-MCNC: 8.2 MG/DL (ref 8.3–10.4)
CHLORIDE SERPL-SCNC: 110 MMOL/L (ref 101–110)
CO2 SERPL-SCNC: 25 MMOL/L (ref 21–32)
CREAT SERPL-MCNC: 1.1 MG/DL (ref 0.8–1.5)
CROSSMATCH RESULT: NORMAL
CROSSMATCH RESULT: NORMAL
ERYTHROCYTE [DISTWIDTH] IN BLOOD BY AUTOMATED COUNT: 14.9 % (ref 11.9–14.6)
GLUCOSE BLD STRIP.AUTO-MCNC: 153 MG/DL (ref 65–100)
GLUCOSE BLD STRIP.AUTO-MCNC: 160 MG/DL (ref 65–100)
GLUCOSE BLD STRIP.AUTO-MCNC: 170 MG/DL (ref 65–100)
GLUCOSE BLD STRIP.AUTO-MCNC: 174 MG/DL (ref 65–100)
GLUCOSE SERPL-MCNC: 155 MG/DL (ref 65–100)
HCT VFR BLD AUTO: 28.4 % (ref 41.1–50.3)
HGB BLD-MCNC: 9.4 G/DL (ref 13.6–17.2)
MAGNESIUM SERPL-MCNC: 2.1 MG/DL (ref 1.8–2.4)
MCH RBC QN AUTO: 30.4 PG (ref 26.1–32.9)
MCHC RBC AUTO-ENTMCNC: 33.1 G/DL (ref 31.4–35)
MCV RBC AUTO: 91.9 FL (ref 82–102)
MM INDURATION, POC: 0 MM (ref 0–5)
NRBC # BLD: 0 K/UL (ref 0–0.2)
PLATELET # BLD AUTO: 97 K/UL (ref 150–450)
PMV BLD AUTO: 13.4 FL (ref 9.4–12.3)
POTASSIUM SERPL-SCNC: 4.2 MMOL/L (ref 3.5–5.1)
PPD, POC: NEGATIVE
RBC # BLD AUTO: 3.09 M/UL (ref 4.23–5.6)
SERVICE CMNT-IMP: ABNORMAL
SODIUM SERPL-SCNC: 138 MMOL/L (ref 133–143)
SPECIMEN EXP DATE BLD: NORMAL
UNIT DIVISION: 0
UNIT DIVISION: 0
WBC # BLD AUTO: 11.1 K/UL (ref 4.3–11.1)

## 2022-10-20 PROCEDURE — 6370000000 HC RX 637 (ALT 250 FOR IP): Performed by: THORACIC SURGERY (CARDIOTHORACIC VASCULAR SURGERY)

## 2022-10-20 PROCEDURE — 83735 ASSAY OF MAGNESIUM: CPT

## 2022-10-20 PROCEDURE — 80048 BASIC METABOLIC PNL TOTAL CA: CPT

## 2022-10-20 PROCEDURE — 97110 THERAPEUTIC EXERCISES: CPT

## 2022-10-20 PROCEDURE — 82962 GLUCOSE BLOOD TEST: CPT

## 2022-10-20 PROCEDURE — 99024 POSTOP FOLLOW-UP VISIT: CPT | Performed by: PHYSICIAN ASSISTANT

## 2022-10-20 PROCEDURE — 2580000003 HC RX 258: Performed by: THORACIC SURGERY (CARDIOTHORACIC VASCULAR SURGERY)

## 2022-10-20 PROCEDURE — 97530 THERAPEUTIC ACTIVITIES: CPT

## 2022-10-20 PROCEDURE — 85027 COMPLETE CBC AUTOMATED: CPT

## 2022-10-20 PROCEDURE — 36415 COLL VENOUS BLD VENIPUNCTURE: CPT

## 2022-10-20 PROCEDURE — 2140000001 HC CVICU INTERMEDIATE R&B

## 2022-10-20 PROCEDURE — 6370000000 HC RX 637 (ALT 250 FOR IP): Performed by: PHYSICIAN ASSISTANT

## 2022-10-20 RX ADMIN — POLYETHYLENE GLYCOL 3350 17 G: 17 POWDER, FOR SOLUTION ORAL at 09:08

## 2022-10-20 RX ADMIN — AMIODARONE HYDROCHLORIDE 200 MG: 200 TABLET ORAL at 20:28

## 2022-10-20 RX ADMIN — FAMOTIDINE 20 MG: 20 TABLET, FILM COATED ORAL at 20:28

## 2022-10-20 RX ADMIN — INSULIN LISPRO 1 UNITS: 100 INJECTION, SOLUTION INTRAVENOUS; SUBCUTANEOUS at 20:37

## 2022-10-20 RX ADMIN — FUROSEMIDE 40 MG: 40 TABLET ORAL at 09:08

## 2022-10-20 RX ADMIN — TRAMADOL HYDROCHLORIDE 100 MG: 50 TABLET, COATED ORAL at 14:23

## 2022-10-20 RX ADMIN — ONDANSETRON 4 MG: 4 TABLET, ORALLY DISINTEGRATING ORAL at 05:53

## 2022-10-20 RX ADMIN — METOPROLOL TARTRATE 12.5 MG: 25 TABLET, FILM COATED ORAL at 20:30

## 2022-10-20 RX ADMIN — POTASSIUM CHLORIDE 10 MEQ: 750 TABLET, EXTENDED RELEASE ORAL at 09:08

## 2022-10-20 RX ADMIN — Medication 1 AMPULE: at 20:27

## 2022-10-20 RX ADMIN — SODIUM CHLORIDE, PRESERVATIVE FREE 10 ML: 5 INJECTION INTRAVENOUS at 09:08

## 2022-10-20 RX ADMIN — Medication 1 AMPULE: at 09:09

## 2022-10-20 RX ADMIN — AMIODARONE HYDROCHLORIDE 200 MG: 200 TABLET ORAL at 09:08

## 2022-10-20 RX ADMIN — SENNOSIDES AND DOCUSATE SODIUM 2 TABLET: 8.6; 5 TABLET ORAL at 09:07

## 2022-10-20 RX ADMIN — SENNOSIDES AND DOCUSATE SODIUM 2 TABLET: 8.6; 5 TABLET ORAL at 20:27

## 2022-10-20 RX ADMIN — ATORVASTATIN CALCIUM 80 MG: 80 TABLET, FILM COATED ORAL at 20:28

## 2022-10-20 RX ADMIN — FAMOTIDINE 20 MG: 20 TABLET, FILM COATED ORAL at 09:07

## 2022-10-20 RX ADMIN — SODIUM CHLORIDE, PRESERVATIVE FREE 10 ML: 5 INJECTION INTRAVENOUS at 20:30

## 2022-10-20 RX ADMIN — OXYCODONE AND ACETAMINOPHEN 1 TABLET: 5; 325 TABLET ORAL at 05:52

## 2022-10-20 ASSESSMENT — PAIN DESCRIPTION - PAIN TYPE
TYPE: SURGICAL PAIN
TYPE: SURGICAL PAIN

## 2022-10-20 ASSESSMENT — PAIN - FUNCTIONAL ASSESSMENT
PAIN_FUNCTIONAL_ASSESSMENT: ACTIVITIES ARE NOT PREVENTED
PAIN_FUNCTIONAL_ASSESSMENT: ACTIVITIES ARE NOT PREVENTED

## 2022-10-20 ASSESSMENT — PAIN DESCRIPTION - ORIENTATION
ORIENTATION: ANTERIOR
ORIENTATION: MID

## 2022-10-20 ASSESSMENT — PAIN SCALES - GENERAL
PAINLEVEL_OUTOF10: 2
PAINLEVEL_OUTOF10: 3
PAINLEVEL_OUTOF10: 6
PAINLEVEL_OUTOF10: 7

## 2022-10-20 ASSESSMENT — PAIN DESCRIPTION - DESCRIPTORS
DESCRIPTORS: ACHING
DESCRIPTORS: ACHING

## 2022-10-20 ASSESSMENT — PAIN DESCRIPTION - FREQUENCY
FREQUENCY: INTERMITTENT
FREQUENCY: INTERMITTENT

## 2022-10-20 ASSESSMENT — PAIN DESCRIPTION - LOCATION
LOCATION: STERNUM
LOCATION: CHEST

## 2022-10-20 ASSESSMENT — PAIN DESCRIPTION - ONSET
ONSET: ON-GOING
ONSET: ON-GOING

## 2022-10-20 NOTE — DISCHARGE SUMMARY
Discharge Summary     Patient ID:  Chantal Vigil  [id number]  60 y.o.  1960    Admit date: 10/17/2022    Discharge date:  10/21/2022    Admitting Physician: Ramírez Peters MD     Discharge Physician: Bhavin Fitzgerald. MIKE Shook/Dr. Mamadou Reyes    Admission Diagnoses: Coronary atherosclerosis of native coronary artery [I25.10]  Abnormal cardiovascular stress test [R94.39]  Dyspnea [R06.00]  CAD in native artery [I25.10]    Discharge Diagnoses:   Patient Active Problem List    Diagnosis Date Noted    CAD in native artery 10/17/2022    S/P CABG x 4 10/17/2022    Encounter for weaning from ventilator ([facility] [address] 10/17/2022    CAD, multiple vessel 10/04/2022    Abnormal stress test 09/23/2022    Agatston coronary artery calcium score greater than 400 09/09/2022    High foot arch 07/08/2022    Chronic SI joint pain 07/08/2022    Dyspnea 10/04/2022    Mixed hyperlipidemia 01/07/2022    Other bursal cyst, right hand 09/03/2021    Low back pain potentially associated with spinal stenosis 09/03/2021    Weakness of both legs 09/03/2021    Gastroesophageal reflux disease without esophagitis 09/03/2021    Restless leg syndrome 12/18/2020    Controlled type 2 diabetes mellitus without complication, without long-term current use of insulin ([facility] [address] 12/18/2020    Bilateral foot pain 08/05/2020    Essential hypertension 08/05/2020       Procedures this admission:   Coronary Artery Bypass Graft Surgery  Consults: [facility] DR JOJO BRYANT Course: Patient presented for elective CABG surgery. He was seen in the office by Dr Little Solorio for FERRER and elevated calcium score. He had noted fatigue and severe dyspnea when doing strenuous yard work. He also reported one episode of near syncope. He underwent a treadmill stress test and was noted to have EKG changes consistent with ischemia. LHC was recommended. He underwent cardiac catheterization that showed severe multivessel disease.  Echocardiogram showed a normal LV EF with mild MR. He was seen back in the office and wished to proceed with CABG surgery. He underwent CABG X 4 with LIMA to the LAD, reverse SVG to the diagonal, reverse SVG to the intermediate coronary and reverse SVG to the PDA on 10/17/22. Post op, he was hypotensive. BP improved and he was transferred to  stepdown. He was weaned off of O2. He progressed well with PT. Appetite improved. BP continued to improve. The morning of 10/21/22, patient was feeling well and ambulating without any symptoms. Patient was determined stable and ready for discharge. Patient was instructed on the importance of medication compliance and outpatient follow up. For maximized medical therapy for CAD, patient will continue ASA, BB, ARB and statin as well. ARB was resumed at discharge. The patient will have close transitional care follow up with [facility] Dr. Preethi García on October 28th at 3:00pm.   The patient will follow up with Dr. Michelle Hendrix on November 8th at 2:00pm and has been referred to cardiac rehab. DISPOSITION: The patient is being discharged home with home health services in stable condition on a low saturated fat, low cholesterol and low salt diet. The patient is instructed to advance activities as tolerated to the limit of fatigue or shortness of breath. The patient is instructed to avoid all heavy lifting or activities that strain the chest or upper arm muscles. Strenuous activity should be avoided. The patient is instructed to watch for signs of infection which include: increasing area of redness, fever or purulent drainage at the incision site. The patient is instructed to call the office or return to the ER for immediate evaluation for any shortness of breath or chest pain not relieved by NTG.     Discharge Exam: BP (!) 147/70   Pulse 70   Temp 97 °F (36.1 °C) (Temporal)   Resp 19   Ht 5' 6.5\" (1.689 m)   Wt 174 lb 12.8 oz (79.3 kg)   SpO2 93%   BMI 27.79 kg/m²       Physical Exam:  General: Well Developed, Well Nourished, No Acute Distress, Alert & Oriented  Neck: supple, no JVD  Heart: S1S2 with RRR without murmurs or gallops  Lungs: Clear throughout auscultation bilaterally without adventitious sounds  Abd: soft, nontender, nondistended, with good bowel sounds  Ext: warm, no edema  Sternal incision: clean, dry, and intact  Skin: warm and dry      Recent Results (from the past 24 hour(s))   POCT Glucose    Collection Time: 10/19/22  3:47 PM   Result Value Ref Range    POC Glucose 167 (H) 65 - 100 mg/dL    Performed by:  Alexandria    POCT Glucose    Collection Time: 10/19/22  9:02 PM   Result Value Ref Range    POC Glucose 162 (H) 65 - 100 mg/dL    Performed by: Leo    Magnesium    Collection Time: 10/20/22  5:22 AM   Result Value Ref Range    Magnesium 2.1 1.8 - 2.4 mg/dL   CBC    Collection Time: 10/20/22  5:22 AM   Result Value Ref Range    WBC 11.1 4.3 - 11.1 K/uL    RBC 3.09 (L) 4.23 - 5.6 M/uL    Hemoglobin 9.4 (L) 13.6 - 17.2 g/dL    Hematocrit 28.4 (L) 41.1 - 50.3 %    MCV 91.9 82 - 102 FL    MCH 30.4 26.1 - 32.9 PG    MCHC 33.1 31.4 - 35.0 g/dL    RDW 14.9 (H) 11.9 - 14.6 %    Platelets 97 (L) 877 - 450 K/uL    MPV 13.4 (H) 9.4 - 12.3 FL    nRBC 0.00 0.0 - 0.2 K/uL   Basic Metabolic Panel    Collection Time: 10/20/22  5:22 AM   Result Value Ref Range    Sodium 138 133 - 143 mmol/L    Potassium 4.2 3.5 - 5.1 mmol/L    Chloride 110 101 - 110 mmol/L    CO2 25 21 - 32 mmol/L    Anion Gap 3 2 - 11 mmol/L    Glucose 155 (H) 65 - 100 mg/dL    BUN 17 8 - 23 MG/DL    Creatinine 1.10 0.8 - 1.5 MG/DL    Est, Glom Filt Rate >60 >60 ml/min/1.73m2    Calcium 8.2 (L) 8.3 - 10.4 MG/DL   POCT Glucose    Collection Time: 10/20/22  6:01 AM   Result Value Ref Range    POC Glucose 160 (H) 65 - 100 mg/dL    Performed by: Leo    POCT Glucose    Collection Time: 10/20/22 11:02 AM   Result Value Ref Range    POC Glucose 174 (H) 65 - 100 mg/dL    Performed by: [facility]          Patient Instructions:      Medication List        START taking these medications      traMADol 50 MG tablet  Commonly known as: ULTRAM  Take 2 tablets by mouth every 6 hours as needed for Pain for up to 7 days. CHANGE how you take these medications      atorvastatin 80 MG tablet  Commonly known as: LIPITOR  Take 1 tablet by mouth daily  What changed:   medication strength  See the new instructions. metoprolol succinate 25 MG extended release tablet  Commonly known as: Toprol XL  Take 1 tablet by mouth daily  What changed: when to take this            CONTINUE taking these medications      acetaminophen 500 MG tablet  Commonly known as: TYLENOL     aspirin 81 MG EC tablet     Magnesium 100 MG Caps     metFORMIN 500 MG tablet  Commonly known as: GLUCOPHAGE  TAKE 1 TABLET BY MOUTH TWICE A DAY WITH MEALS     olmesartan 20 MG tablet  Commonly known as: BENICAR  TAKE 1 TABLET BY MOUTH EVERY DAY     omeprazole 20 MG tablet  Commonly known as: PRILOSEC OTC     pregabalin 150 MG capsule  Commonly known as: LYRICA     vitamin B-12 1000 MCG tablet  Commonly known as: CYANOCOBALAMIN     vitamin D 50 MCG (2000 UT) Caps capsule            STOP taking these medications      amiodarone 200 MG tablet  Commonly known as: CORDARONE     naproxen sodium 220 MG tablet  Commonly known as: ANAPROX               Where to Get Your Medications        These medications were sent to [facility]/pharmacy #[id number]- [address], [address]      Phone: [phone]   atorvastatin 80 MG tablet  traMADol 50 MG tablet            Signed:  Christina Shook PA-C  10/21/2022  8:54 AM

## 2022-10-20 NOTE — PROGRESS NOTES
ACUTE PHYSICAL THERAPY GOALS:   (Developed with and agreed upon by patient and/or caregiver. )  LTG:  (1.)Mr. Estevez will move from supine to sit and sit to supine , scoot up and down and roll side to side in flat bed without siderails with  INDEPENDENT within 7 day(s). (2.)Mr. Estevez will perform all functional transfers with  INDEPENDENT using  no device within 7 day(s). (3.)Mr. Estevez will ambulate with  INDEPENDENT for 750+ feet with normal vital sign response with no device within 7 day(s). (4.)Mr. Estevez will ambulate up/down 2 steps with bilateral railing with  MODIFIED INDEPENDENCE with no device within  7  day(s). PHYSICAL THERAPY: Daily Note PM   (Link to Caseload Tracking: PT Visit Days : 3  Time In/Out PT Charge Capture  Rehab Caseload Tracker  Orders    Albin Lambert is a 60 y.o. male   PRIMARY DIAGNOSIS: S/P CABG x 4  Coronary atherosclerosis of native coronary artery [I25.10]  Abnormal cardiovascular stress test [R94.39]  Dyspnea [R06.00]  CAD in native artery [I25.10]  Procedure(s) (LRB):  CABG CORONARY ARTERY BYPASS (CABG X4) LIMA / ENDOSCOPIC VEIN HARVEST - GREATER SAPHENOUS VEIN (N/A)  TRANSESOPHAGEAL ECHOCARDIOGRAM (N/A)  3 Days Post-Op  Inpatient: Payor: [facility] / Plan: [facility] / Product Type: *No Product type* /     ASSESSMENT:     REHAB RECOMMENDATIONS:   Recommendation to date pending progress:  Setting:  Home Health Therapy    Equipment:    None     ASSESSMENT:  Mr. Brien Neil is sitting up in chair on arrival. He is agreeable to PT and plans to go home with help from his sisters at discharge. He ambulated 350' with no AD and supervision. BLE exercises performed while up in chair. Pt left in chair with needs in reach. PM:  Pt increased ambulation this afternoon to 540' with no AD and S. Continues to do well and plans to go home tomorrow.       SUBJECTIVE:   Mr. Brien Neil states, \"Let's go\"     Social/Functional Lives With: Alone  Type of Home: House  Home Layout: One level  Home Access: Stairs to enter without rails  Entrance Stairs - Number of Steps: 1  Ambulation Assistance: Independent  Transfer Assistance: Independent  Active : Yes  Occupation: Full time employment  OBJECTIVE:     PAIN: VITALS / O2: PRECAUTION / [place] / Alee Tracy:   Pre Treatment: 0/10         Post Treatment: 0/10  Vitals        Oxygen    Telemetry     RESTRICTIONS/PRECAUTIONS:        MOBILITY: I Mod I S SBA CGA Min Mod Max Total  NT x2 Comments:   Bed Mobility    Rolling [] [] [] [] [] [] [] [] [] [x] []    Supine to Sit [] [] [] [] [] [] [] [] [] [x] []    Scooting [] [] [] [] [] [] [] [] [] [x] []    Sit to Supine [] [] [] [] [] [] [] [] [] [x] []    Transfers    Sit to Stand [] [] [] [x] [] [] [] [] [] [] []    Bed to Chair [] [] [] [] [] [] [] [] [] [x] []    Stand to Sit [] [] [] [x] [] [] [] [] [] [] []     [] [] [] [] [] [] [] [] [] [] []    I=Independent, Mod I=Modified Independent, S=Supervision, SBA=Standby Assistance, CGA=Contact Guard Assistance,   Min=Minimal Assistance, Mod=Moderate Assistance, Max=Maximal Assistance, Total=Total Assistance, NT=Not Tested    BALANCE: Good Fair+ Fair Fair- Poor NT Comments   Sitting Static [x] [] [] [] [] []    Sitting Dynamic [x] [] [] [] [] []              Standing Static [x] [] [] [] [] []    Standing Dynamic [x] [] [] [] [] []      GAIT: I Mod I S SBA CGA Min Mod Max Total  NT x2 Comments:   Level of Assistance [] [] [x] [] [] [] [] [] [] [] []    Distance 540 feet    DME None    Gait Quality slow    Weightbearing Status      Stairs      I=Independent, Mod I=Modified Independent, S=Supervision, SBA=Standby Assistance, CGA=Contact Guard Assistance,   Min=Minimal Assistance, Mod=Moderate Assistance, Max=Maximal Assistance, Total=Total Assistance, NT=Not Tested    PLAN:   FREQUENCY AND DURATION: BID for duration of hospital stay or until stated goals are met, whichever comes first.    TREATMENT:   TREATMENT: Therapeutic Activity (13 Minutes): Therapeutic activity included Transfer Training, Ambulation on level ground, Sitting balance , and Standing balance to improve functional Activity tolerance, Balance, Coordination, Mobility, and Strength. Therapeutic Exercise (10 Minutes): Therapeutic exercises noted below to improve functional activity tolerance, AROM, strength, and mobility.      TREATMENT GRID:     Date:  10/19/22 Date:  10/20/22 Date:     ACTIVITY/EXERCISE AM PM AM PM AM PM   LAQ 15 15 15 B 15 B     Shoulder shrugs 15 15 15 B 15 B     Gluteal sets 15 15 15 B 15 B     marching 15 15 15 B 15 B     Ankle pumps 15 15 15 B 15 B     abduction 15 15 15 B 15 B              B = bilateral; AA = active assistive; A = active; P = passive    AFTER TREATMENT PRECAUTIONS: Bed/Chair Locked, Call light within reach, Chair, Needs within reach, and RN notified    INTERDISCIPLINARY COLLABORATION:  RN/ PCT and PT/ PTA    EDUCATION:  review POC and importance of mobility in the recovery process    TIME IN/OUT:  Time In: 1410  Time Out: 0037  Minutes: 835 Sia Bustos PTA

## 2022-10-20 NOTE — PROGRESS NOTES
Today's Date: 10/20/2022  Date of Admission: 10/17/2022    Chart Reviewed. Subjective:     Patient feels better today. Appetite improved. He is passing gas but no BM yet. Medications Reviewed. Objective:     Vitals:    10/19/22 2322 10/20/22 0315 10/20/22 0552 10/20/22 0716   BP: 122/64 128/70  115/61   Pulse: 77 80  75   Resp: 18 18  16   Temp: 98.6 °F (37 °C) 99.2 °F (37.3 °C)  97 °F (36.1 °C)   TempSrc: Oral Oral  Temporal   SpO2: 96% 93%  93%   Weight:   176 lb 12.8 oz (80.2 kg)    Height:           Intake and Output  Current Shift: No intake/output data recorded. Last 3 Shifts: 10/18 1901 - 10/20 0700  In: 0   Out: 3250 [Urine:3250]    Physical Exam:  General: Well Developed, Well Nourished, No Acute Distress, Alert & Oriented x 3, Appropriate mood  Neck: supple, no JVD  Heart: S1S2 with RRR without murmurs or gallops  Lungs: mostly clear throughout auscultation bilaterally  Abd: soft, nontender, nondistended, with good bowel sounds  Ext: no edema bilaterally  Sternal incision: clean, dry, and intact  Skin: warm and dry    LABS  Data Review:   Recent Labs     10/17/22  1251 10/17/22  1717 10/19/22  0633 10/20/22  0522   *   < > 141 138   K 3.9   < > 4.5 4.2   MG 3.2*   < > 2.4 2.1   BUN 19   < > 21 17   WBC 17.4*   < > 13.5* 11.1   HGB 11.6*   < > 9.7* 9.4*   HCT 34.6*   < > 30.3* 28.4*   *   < > 94* 97*   INR 1.4  --   --   --     < > = values in this interval not displayed. Estimated Creatinine Clearance: 69 mL/min (based on SCr of 1.1 mg/dL).       Assessment/Plan:     *S/P CABG x 4  Holding ASA due to thrombocytopenia, BP improved, will add low dose BB, continue statin, stable on room air, continue PT     Active Hospital Problems    CAD in native artery  S/p CABG        Encounter for weaning from ventilator [facility])       CAD, multiple vessel  S/p CABG       Abnormal stress test  S/p CABG       Dyspnea       Gastroesophageal reflux disease without esophagitis  Continue Pepcid       Controlled type 2 diabetes mellitus without complication, without long-term current use of insulin (HCC)  SSI, on Metformin at home        Essential hypertension  BP improved    Dispo  Likely home with [facility] in AM        Luly Shook PA-C

## 2022-10-20 NOTE — PROGRESS NOTES
ACUTE PHYSICAL THERAPY GOALS:   (Developed with and agreed upon by patient and/or caregiver. )  LTG:  (1.)Mr. Estevez will move from supine to sit and sit to supine , scoot up and down and roll side to side in flat bed without siderails with  INDEPENDENT within 7 day(s). (2.)Mr. Estevez will perform all functional transfers with  INDEPENDENT using  no device within 7 day(s). (3.)Mr. Estevez will ambulate with  INDEPENDENT for 750+ feet with normal vital sign response with no device within 7 day(s). (4.)Mr. Estevez will ambulate up/down 2 steps with bilateral railing with  MODIFIED INDEPENDENCE with no device within  7  day(s). PHYSICAL THERAPY: Daily Note AM   (Link to Caseload Tracking: PT Visit Days : 3  Time In/Out PT Charge Capture  Rehab Caseload Tracker  Orders    Josue Hernandez is a 60 y.o. male   PRIMARY DIAGNOSIS: S/P CABG x 4  Coronary atherosclerosis of native coronary artery [I25.10]  Abnormal cardiovascular stress test [R94.39]  Dyspnea [R06.00]  CAD in native artery [I25.10]  Procedure(s) (LRB):  CABG CORONARY ARTERY BYPASS (CABG X4) LIMA / ENDOSCOPIC VEIN HARVEST - GREATER SAPHENOUS VEIN (N/A)  TRANSESOPHAGEAL ECHOCARDIOGRAM (N/A)  3 Days Post-Op  Inpatient: Payor: [facility] / Plan: [facility] / Product Type: *No Product type* /     ASSESSMENT:     REHAB RECOMMENDATIONS:   Recommendation to date pending progress:  Setting:  Home Health Therapy    Equipment:    None     ASSESSMENT:  Mr. Hany Berry is sitting up in chair on arrival. He is agreeable to PT and plans to go home with help from his sisters at discharge. He ambulated 350' with no AD and supervision. BLE exercises performed while up in chair. Pt left in chair with needs in reach.        SUBJECTIVE:   Mr. Hany Berry states, \"I'm always ready to walk\"     Social/Functional Lives With: Alone  Type of Home: House  Home Layout: One level  Home Access: Stairs to enter without rails  Entrance Stairs - Number of Steps: 1  Ambulation Assistance: Independent  Transfer Assistance: Independent  Active : Yes  Occupation: Full time employment  OBJECTIVE:     PAIN: VITALS / O2: PRECAUTION / Michelle Riddles / DRAINS:   Pre Treatment: 0/10         Post Treatment: 0/10  Vitals        Oxygen    Telemetry     RESTRICTIONS/PRECAUTIONS:        MOBILITY: I Mod I S SBA CGA Min Mod Max Total  NT x2 Comments:   Bed Mobility    Rolling [] [] [] [] [] [] [] [] [] [x] []    Supine to Sit [] [] [] [] [] [] [] [] [] [x] []    Scooting [] [] [] [] [] [] [] [] [] [x] []    Sit to Supine [] [] [] [] [] [] [] [] [] [x] []    Transfers    Sit to Stand [] [] [] [x] [] [] [] [] [] [] []    Bed to Chair [] [] [] [] [] [] [] [] [] [x] []    Stand to Sit [] [] [] [x] [] [] [] [] [] [] []     [] [] [] [] [] [] [] [] [] [] []    I=Independent, Mod I=Modified Independent, S=Supervision, SBA=Standby Assistance, CGA=Contact Guard Assistance,   Min=Minimal Assistance, Mod=Moderate Assistance, Max=Maximal Assistance, Total=Total Assistance, NT=Not Tested    BALANCE: Good Fair+ Fair Fair- Poor NT Comments   Sitting Static [x] [] [] [] [] []    Sitting Dynamic [x] [] [] [] [] []              Standing Static [x] [] [] [] [] []    Standing Dynamic [x] [] [] [] [] []      GAIT: I Mod I S SBA CGA Min Mod Max Total  NT x2 Comments:   Level of Assistance [] [] [x] [] [] [] [] [] [] [] []    Distance   350 feet    DME None    Gait Quality slow    Weightbearing Status      Stairs      I=Independent, Mod I=Modified Independent, S=Supervision, SBA=Standby Assistance, CGA=Contact Guard Assistance,   Min=Minimal Assistance, Mod=Moderate Assistance, Max=Maximal Assistance, Total=Total Assistance, NT=Not Tested    PLAN:   FREQUENCY AND DURATION: BID for duration of hospital stay or until stated goals are met, whichever comes first.    TREATMENT:   TREATMENT:   Therapeutic Activity (13 Minutes):  Therapeutic activity included Transfer Training, Ambulation on level ground, Sitting balance , and Standing balance to improve functional Activity tolerance, Balance, Coordination, Mobility, and Strength. Therapeutic Exercise (10 Minutes): Therapeutic exercises noted below to improve functional activity tolerance, AROM, strength, and mobility.      TREATMENT GRID:     Date:  10/19/22 Date:  10/20/22 Date:     ACTIVITY/EXERCISE AM PM AM PM AM PM   LAQ 15 15 15 B      Shoulder shrugs 15 15 15 B      Gluteal sets 15 15 15 B      marching 15 15 15 B      Ankle pumps 15 15 15 B      abduction 15 15 15 B               B = bilateral; AA = active assistive; A = active; P = passive    AFTER TREATMENT PRECAUTIONS: Bed/Chair Locked, Call light within reach, Chair, Needs within reach, and RN notified    INTERDISCIPLINARY COLLABORATION:  RN/ PCT and PT/ PTA    EDUCATION:  review POC and importance of mobility in the recovery process    TIME IN/OUT:  Time In: 1000  Time Out: [address]  Minutes: [facility], \[facility]\""

## 2022-10-20 NOTE — PROGRESS NOTES
Physician Progress Note      Lexi Sam  CSN #:                  [id number]  :                       1960  ADMIT DATE:       10/17/2022 4:56 AM  DISCH DATE:  Allison Vega  PROVIDER #:        Jacquelin MCKEON          QUERY TEXT:    Pt admitted for CABG. If possible, please document in the progress notes and   discharge summary if you are evaluating and/or treating any of the following: The medical record reflects the following:  Risk Factors: CABGx4  Clinical Indicators: HGB 13.4>8.9  RN PN states \"Up to walk 25 feet with walker doing well. Back to recliner with   200ml dump f serosangeous blood.  BP in 70s\"  Treatment: pressors, ICU management, possible transfusion  Options provided:  -- Cardiogenic Shock  -- Hemorrhagic Shock  -- Hypovolemic Shock  -- Other - I will add my own diagnosis  -- Disagree - Not applicable / Not valid  -- Disagree - Clinically unable to determine / Unknown  -- Refer to Clinical Documentation Reviewer    PROVIDER RESPONSE TEXT:    Hypotension without shock    Query created by: Brigitte Herrera on 10/19/2022 9:34 AM      Electronically signed by:  Josemanuel MCKEON 10/20/2022 8:57 AM

## 2022-10-20 NOTE — PROGRESS NOTES
Physician Progress Note      Apolinar Myrick  CSN #:                  [id number]  :                       1960  ADMIT DATE:       10/17/2022 4:56 AM  DISCH DATE:  Cleta Schwab  PROVIDER #:        Ashok MCKEON          QUERY TEXT:    Type of Anemia    Please provide further specificity, if known.     - Anemia due to acute blood loss  - Anemia due to chronic blood loss  - Anemia due to iron deficiency  - Anemia due to postoperative blood loss  - Anemia due to chronic disease        Options provided:  -- Respond - Create new note now  -- Disagree - Not applicable / Not valid  -- Disagree - Clinically unable to determine / Unknown  -- Refer to Clinical Documentation Reviewer    PROVIDER RESPONSE TEXT:    Postoperative anemia due to acute blood loss    Query created by: Constantin Hansen on 10/19/2022 9:35 AM      Electronically signed by:  Ashok MCKEON 10/20/2022 12:00 PM

## 2022-10-20 NOTE — PLAN OF CARE
Problem: Chronic Conditions and Co-morbidities  Goal: Patient's chronic conditions and co-morbidity symptoms are monitored and maintained or improved  10/19/2022 2215 by Jayant Richards RN  Outcome: Progressing  10/19/2022 1845 by Cordelia Angel RN  Outcome: Progressing  Flowsheets (Taken 10/19/2022 5231)  Care Plan - Patient's Chronic Conditions and Co-Morbidity Symptoms are Monitored and Maintained or Improved: Monitor and assess patient's chronic conditions and comorbid symptoms for stability, deterioration, or improvement     Problem: Discharge Planning  Goal: Discharge to home or other facility with appropriate resources  Outcome: Progressing     Problem: Pain  Goal: Verbalizes/displays adequate comfort level or baseline comfort level  Outcome: Progressing  Flowsheets (Taken 10/19/2022 0910 by Cordelia Angel RN)  Verbalizes/displays adequate comfort level or baseline comfort level: Encourage patient to monitor pain and request assistance

## 2022-10-21 VITALS
HEIGHT: 67 IN | SYSTOLIC BLOOD PRESSURE: 147 MMHG | HEART RATE: 70 BPM | TEMPERATURE: 97 F | OXYGEN SATURATION: 93 % | RESPIRATION RATE: 19 BRPM | BODY MASS INDEX: 27.44 KG/M2 | DIASTOLIC BLOOD PRESSURE: 70 MMHG | WEIGHT: 174.8 LBS

## 2022-10-21 LAB
ANION GAP SERPL CALC-SCNC: 4 MMOL/L (ref 2–11)
BUN SERPL-MCNC: 16 MG/DL (ref 8–23)
CALCIUM SERPL-MCNC: 8.2 MG/DL (ref 8.3–10.4)
CHLORIDE SERPL-SCNC: 108 MMOL/L (ref 101–110)
CO2 SERPL-SCNC: 29 MMOL/L (ref 21–32)
CREAT SERPL-MCNC: 1 MG/DL (ref 0.8–1.5)
ERYTHROCYTE [DISTWIDTH] IN BLOOD BY AUTOMATED COUNT: 14.6 % (ref 11.9–14.6)
GLUCOSE BLD STRIP.AUTO-MCNC: 126 MG/DL (ref 65–100)
GLUCOSE SERPL-MCNC: 145 MG/DL (ref 65–100)
HCT VFR BLD AUTO: 28.6 % (ref 41.1–50.3)
HGB BLD-MCNC: 9.5 G/DL (ref 13.6–17.2)
MAGNESIUM SERPL-MCNC: 2.1 MG/DL (ref 1.8–2.4)
MCH RBC QN AUTO: 30.3 PG (ref 26.1–32.9)
MCHC RBC AUTO-ENTMCNC: 33.2 G/DL (ref 31.4–35)
MCV RBC AUTO: 91.1 FL (ref 82–102)
NRBC # BLD: 0 K/UL (ref 0–0.2)
PLATELET # BLD AUTO: 132 K/UL (ref 150–450)
PMV BLD AUTO: 12.4 FL (ref 9.4–12.3)
POTASSIUM SERPL-SCNC: 4.2 MMOL/L (ref 3.5–5.1)
RBC # BLD AUTO: 3.14 M/UL (ref 4.23–5.6)
SERVICE CMNT-IMP: ABNORMAL
SODIUM SERPL-SCNC: 141 MMOL/L (ref 133–143)
WBC # BLD AUTO: 9.1 K/UL (ref 4.3–11.1)

## 2022-10-21 PROCEDURE — 99024 POSTOP FOLLOW-UP VISIT: CPT | Performed by: PHYSICIAN ASSISTANT

## 2022-10-21 PROCEDURE — 82962 GLUCOSE BLOOD TEST: CPT

## 2022-10-21 PROCEDURE — 85027 COMPLETE CBC AUTOMATED: CPT

## 2022-10-21 PROCEDURE — 83735 ASSAY OF MAGNESIUM: CPT

## 2022-10-21 PROCEDURE — 6370000000 HC RX 637 (ALT 250 FOR IP): Performed by: THORACIC SURGERY (CARDIOTHORACIC VASCULAR SURGERY)

## 2022-10-21 PROCEDURE — 36415 COLL VENOUS BLD VENIPUNCTURE: CPT

## 2022-10-21 PROCEDURE — 97110 THERAPEUTIC EXERCISES: CPT

## 2022-10-21 PROCEDURE — 97530 THERAPEUTIC ACTIVITIES: CPT

## 2022-10-21 PROCEDURE — 6370000000 HC RX 637 (ALT 250 FOR IP): Performed by: PHYSICIAN ASSISTANT

## 2022-10-21 PROCEDURE — 80048 BASIC METABOLIC PNL TOTAL CA: CPT

## 2022-10-21 RX ORDER — ATORVASTATIN CALCIUM 80 MG/1
80 TABLET, FILM COATED ORAL DAILY
Qty: 90 TABLET | Refills: 3 | Status: SHIPPED | OUTPATIENT
Start: 2022-10-21

## 2022-10-21 RX ORDER — TRAMADOL HYDROCHLORIDE 50 MG/1
100 TABLET ORAL EVERY 6 HOURS PRN
Qty: 60 TABLET | Refills: 0 | Status: SHIPPED | OUTPATIENT
Start: 2022-10-21 | End: 2022-10-28

## 2022-10-21 RX ADMIN — Medication 1 AMPULE: at 08:34

## 2022-10-21 RX ADMIN — INSULIN LISPRO 2 UNITS: 100 INJECTION, SOLUTION INTRAVENOUS; SUBCUTANEOUS at 08:39

## 2022-10-21 RX ADMIN — FAMOTIDINE 20 MG: 20 TABLET, FILM COATED ORAL at 08:33

## 2022-10-21 RX ADMIN — FUROSEMIDE 40 MG: 40 TABLET ORAL at 08:34

## 2022-10-21 RX ADMIN — METOPROLOL TARTRATE 12.5 MG: 25 TABLET, FILM COATED ORAL at 08:34

## 2022-10-21 RX ADMIN — TRAMADOL HYDROCHLORIDE 100 MG: 50 TABLET, COATED ORAL at 06:30

## 2022-10-21 RX ADMIN — ASPIRIN 81 MG: 81 TABLET ORAL at 09:00

## 2022-10-21 RX ADMIN — AMIODARONE HYDROCHLORIDE 200 MG: 200 TABLET ORAL at 08:33

## 2022-10-21 RX ADMIN — POTASSIUM CHLORIDE 10 MEQ: 750 TABLET, EXTENDED RELEASE ORAL at 08:33

## 2022-10-21 RX ADMIN — ACETAMINOPHEN 650 MG: 325 TABLET, FILM COATED ORAL at 08:32

## 2022-10-21 ASSESSMENT — PAIN SCALES - GENERAL
PAINLEVEL_OUTOF10: 5
PAINLEVEL_OUTOF10: 4

## 2022-10-21 ASSESSMENT — PAIN DESCRIPTION - DESCRIPTORS
DESCRIPTORS: ACHING;OTHER (COMMENT)
DESCRIPTORS: ACHING

## 2022-10-21 ASSESSMENT — PAIN DESCRIPTION - LOCATION
LOCATION: STERNUM
LOCATION: CHEST;TEETH

## 2022-10-21 ASSESSMENT — PAIN DESCRIPTION - ORIENTATION: ORIENTATION: MID

## 2022-10-21 ASSESSMENT — PAIN DESCRIPTION - PAIN TYPE: TYPE: SURGICAL PAIN

## 2022-10-21 ASSESSMENT — PAIN DESCRIPTION - ONSET: ONSET: ON-GOING

## 2022-10-21 ASSESSMENT — PAIN - FUNCTIONAL ASSESSMENT: PAIN_FUNCTIONAL_ASSESSMENT: ACTIVITIES ARE NOT PREVENTED

## 2022-10-21 ASSESSMENT — PAIN DESCRIPTION - FREQUENCY: FREQUENCY: INTERMITTENT

## 2022-10-21 NOTE — PROGRESS NOTES
ACUTE PHYSICAL THERAPY GOALS:   (Developed with and agreed upon by patient and/or caregiver. )  LTG:  (1.)Mr. Estevez will move from supine to sit and sit to supine , scoot up and down and roll side to side in flat bed without siderails with  INDEPENDENT within 7 day(s). (2.)Mr. Estevez will perform all functional transfers with  INDEPENDENT using  no device within 7 day(s). (3.)Mr. Estevez will ambulate with  INDEPENDENT for 750+ feet with normal vital sign response with no device within 7 day(s). (4.)Mr. Estevez will ambulate up/down 2 steps with bilateral railing with  MODIFIED INDEPENDENCE with no device within  7  day(s). PHYSICAL THERAPY: Daily Note AM   (Link to Caseload Tracking: PT Visit Days : 4  Time In/Out PT Charge Capture  Rehab Caseload Tracker  Orders    Demi Harris is a 60 y.o. male   PRIMARY DIAGNOSIS: S/P CABG x 4  Coronary atherosclerosis of native coronary artery [I25.10]  Abnormal cardiovascular stress test [R94.39]  Dyspnea [R06.00]  CAD in native artery [I25.10]  Procedure(s) (LRB):  CABG CORONARY ARTERY BYPASS (CABG X4) LIMA / ENDOSCOPIC VEIN HARVEST - GREATER SAPHENOUS VEIN (N/A)  TRANSESOPHAGEAL ECHOCARDIOGRAM (N/A)  4 Days Post-Op  Inpatient: Payor: [facility] / Plan: [facility] / Product Type: *No Product type* /     ASSESSMENT:     REHAB RECOMMENDATIONS:   Recommendation to date pending progress:  Setting:  Home Health Therapy    Equipment:    None     ASSESSMENT:  Mr. Emma Martinez was sitting up in chair on arrival. He is agreeable to PT and plans to go home today with HHPT. He is doing well with mobility and exercises. States he feels comfortable with steps at home. Went over HEP and he was able to remember all exercises but 1. Pt left sitting up in chair with needs in reach.        SUBJECTIVE:   Mr. Emma Martinez states, \"I'm ready\"     Social/Functional Lives With: Alone  Type of Home: House  Home Layout: One level  Home Access: Stairs to enter without rails  Entrance Stairs - Number of Steps: 1  Ambulation Assistance: Independent  Transfer Assistance: Independent  Active : Yes  Occupation: Full time employment  OBJECTIVE:     PAIN: VITALS / O2: PRECAUTION / Izell Blazing / Blevins Merino:   Pre Treatment: 0/10         Post Treatment: 0/10  Vitals        Oxygen    Telemetry     RESTRICTIONS/PRECAUTIONS:        MOBILITY: I Mod I S SBA CGA Min Mod Max Total  NT x2 Comments:   Bed Mobility    Rolling [] [] [] [] [] [] [] [] [] [x] []    Supine to Sit [] [] [] [] [] [] [] [] [] [x] []    Scooting [] [] [] [] [] [] [] [] [] [x] []    Sit to Supine [] [] [] [] [] [] [] [] [] [x] []    Transfers    Sit to Stand [] [] [x] [] [] [] [] [] [] [] []    Bed to Chair [] [] [] [] [] [] [] [] [] [x] []    Stand to Sit [] [] [x] [] [] [] [] [] [] [] []     [] [] [] [] [] [] [] [] [] [] []    I=Independent, Mod I=Modified Independent, S=Supervision, SBA=Standby Assistance, CGA=Contact Guard Assistance,   Min=Minimal Assistance, Mod=Moderate Assistance, Max=Maximal Assistance, Total=Total Assistance, NT=Not Tested    BALANCE: Good Fair+ Fair Fair- Poor NT Comments   Sitting Static [x] [] [] [] [] []    Sitting Dynamic [x] [] [] [] [] []              Standing Static [x] [] [] [] [] []    Standing Dynamic [x] [] [] [] [] []      GAIT: I Mod I S SBA CGA Min Mod Max Total  NT x2 Comments:   Level of Assistance [] [] [x] [] [] [] [] [] [] [] []    Distance 540 feet    DME None    Gait Quality slow    Weightbearing Status      Stairs      I=Independent, Mod I=Modified Independent, S=Supervision, SBA=Standby Assistance, CGA=Contact Guard Assistance,   Min=Minimal Assistance, Mod=Moderate Assistance, Max=Maximal Assistance, Total=Total Assistance, NT=Not Tested    PLAN:   FREQUENCY AND DURATION: BID for duration of hospital stay or until stated goals are met, whichever comes first.    TREATMENT:   TREATMENT:   Therapeutic Activity (15 Minutes):  Therapeutic activity included Ambulation on level ground, Sitting balance , and Standing balance to improve functional Activity tolerance, Balance, Mobility, and Strength. Therapeutic Exercise (10 Minutes): Therapeutic exercises noted below to improve functional activity tolerance, AROM, strength, and mobility.      TREATMENT GRID:     Date:  10/19/22 Date:  10/20/22 Date:  10/21/22   ACTIVITY/EXERCISE AM PM AM PM AM PM   LAQ 15 15 15 B 15 B 20 B    Shoulder shrugs 15 15 15 B 15 B 20 B    Gluteal sets 15 15 15 B 15 B 20 B    marching 15 15 15 B 15 B 20 B    Ankle pumps 15 15 15 B 15 B 20 B    abduction 15 15 15 B 15 B 20 B             B = bilateral; AA = active assistive; A = active; P = passive    AFTER TREATMENT PRECAUTIONS: Bed/Chair Locked, Call light within reach, Chair, Needs within reach, and RN notified    INTERDISCIPLINARY COLLABORATION:  RN/ PCT and PT/ PTA    EDUCATION:  review POC and importance of mobility in the recovery process    TIME IN/OUT:  Time In: 0925  Time Out: 4379  Minutes: [address] PTA

## 2022-10-21 NOTE — PLAN OF CARE
Problem: Chronic Conditions and Co-morbidities  Goal: Patient's chronic conditions and co-morbidity symptoms are monitored and maintained or improved  Outcome: Progressing  Flowsheets (Taken 10/20/2022 0815 by Vicki Agudelo RN)  Care Plan - Patient's Chronic Conditions and Co-Morbidity Symptoms are Monitored and Maintained or Improved: Monitor and assess patient's chronic conditions and comorbid symptoms for stability, deterioration, or improvement     Problem: Discharge Planning  Goal: Discharge to home or other facility with appropriate resources  Outcome: Progressing  Flowsheets (Taken 10/20/2022 0815 by Vicki Agudelo RN)  Discharge to home or other facility with appropriate resources: Identify barriers to discharge with patient and caregiver     Problem: Pain  Goal: Verbalizes/displays adequate comfort level or baseline comfort level  Outcome: Progressing  Flowsheets (Taken 10/20/2022 0815 by Vicki Agudelo RN)  Verbalizes/displays adequate comfort level or baseline comfort level: Encourage patient to monitor pain and request assistance

## 2022-10-21 NOTE — PROGRESS NOTES
Discharge instructions, follow up appointments and prescriptions reviewed with patient and family. Both verbalize understanding. All personal belongings taken with patient. Family member will drive patient home. Patient escorted to discharge area via wheelchair. Patient is stable at discharge. PIV, CT sutures, and monitor removed.

## 2022-10-21 NOTE — CARE COORDINATION
Pt with discharge orders this day. Pt to return home with support of his sister this day. Home health arranged already and [place] liaison updated on pt discharge home this day. No additional CM needs at discharge. 10/17/22 1324   Service Assessment   Patient Orientation Alert and Oriented   Cognition Alert   History Provided By Patient   Primary Caregiver Self   Support Systems Family Members   PCP Verified by CM Yes   Last Visit to PCP Within last 3 months   Prior Functional Level Independent in ADLs/IADLs   Current Functional Level Other (see comment)  (TBD by clinical team)   Can patient return to prior living arrangement Yes   Ability to make needs known: Good   Family able to assist with home care needs: Yes   Social/Functional History   Lives With Alone   Type of Home House   Discharge Planning   Type of [address]   DME Ordered? No   Type of Home Care Services PT;Nursing Services   Patient expects to be discharged to: [address] Discharge   Confirm Follow Up Transport Family   Condition of Participation: Discharge Planning   The Plan for Transition of Care is related to the following treatment goals: Home with family support and home health   The Patient and/or Patient Representative was provided with a Choice of Provider? Patient   The Patient and/Or Patient Representative agree with the Discharge Plan? Yes   Freedom of Choice list was provided with basic dialogue that supports the patient's individualized plan of care/goals, treatment preferences, and shares the quality data associated with the providers?   Yes

## 2022-10-21 NOTE — PROGRESS NOTES
Cardiac Rehab: Spoke with patient regarding referral to cardiac rehab. Patient meets admission criteria based on CABG x4 (10/17/22). Written information about Cardiac Rehab given and reviewed with patient. Discussed lifestyle modifications to promote cardiac wellness. Patient indicated that he wants to participate in the cardiac rehab program and his orientation has been scheduled. His Cardiologist is Dr. Carlos Alberto Milton.       Thank you,  JULES DoughertyN, RN  Cardiopulmonary Rehabilitation Nurse Liaison  [facility]

## 2022-10-24 ENCOUNTER — CARE COORDINATION (OUTPATIENT)
Dept: CARE COORDINATION | Facility: CLINIC | Age: 62
End: 2022-10-24

## 2022-10-24 NOTE — CARE COORDINATION
[facility] Care Transitions Initial Follow Up Call    Call within 2 business days of discharge: Yes    Care Transition Nurse contacted the family by telephone to perform post hospital discharge assessment. Verified name and  with family as identifiers. Provided introduction to self, and explanation of the Care Transition Nurse role. Patient: Author Res. Patient : 1960   MRN: [id number]  Reason for Admission: s/p CABG x 4  Discharge Date: 10/21/22 RARS: Readmission Risk Score: 11      Last Discharge [address]       Date Complaint Diagnosis Description Type Department Provider    10/17/22  S/P CABG x 4 Admission (Discharged) [id number] Vaibhav Hernandez MD            Was this an external facility discharge? No Discharge Facility: [facility]    Challenges to be reviewed by the provider   Additional needs identified to be addressed with provider: No  none               Method of communication with provider: none. Care Transition Nurse reviewed discharge instructions and red flags with family who verbalized understanding. The family was given an opportunity to ask questions and does not have any further questions or concerns at this time. Were discharge instructions available to patient? Yes. Reviewed appropriate site of care based on symptoms and resources available to patient including: PCP  Specialist  Urgent care clinics  [facility]. The family agrees to contact the PCP office for questions related to their healthcare. Advance Care Planning:   Does patient have an Advance Directive: decision maker updated. Medication reconciliation was performed with family, who verbalizes understanding of administration of home medications.  Medications reviewed, 1111F entered: N/A    Was patient discharged with a pulse oximeter? no    Non-face-to-face services provided:  Obtained and reviewed discharge summary and/or continuity of care documents  Reviewed follow up appointments  Confirmed Interim HH resumed services 10/22/2022  Reviewed discharge disposition by  Cardiothoracic Surgery with patient's sister: The patient is being discharged home with home health services in stable condition on a low saturated fat, low cholesterol and low salt diet. The patient is instructed to advance activities as tolerated to the limit of fatigue or shortness of breath. The patient is instructed to avoid all heavy lifting or activities that strain the chest or upper arm muscles. Strenuous activity should be avoided. The patient is instructed to watch for signs of infection which include: increasing area of redness, fever or purulent drainage at the incision site. The patient is instructed to call the office or return to the ER for immediate evaluation for any shortness of breath or chest pain not relieved by NTG. Goals Addressed                   This Visit's Progress     Returns to baseline activity level. Patient/Family able to obtain medicine after d/c     Patient/Family able to verbalize medicine changes     Patient/Family aware and attends follow up appointments s/p d/c. Patient/Family to monitor for signs of infection: increasing redness, fever/hot to touch or purulent drainage at  site                Offered patient enrollment in the Remote Patient Monitoring (RPM) program for in-home monitoring: NA.    Care Transitions 24 Hour Call    Schedule Follow Up Appointment with PCP: Completed  Do you have a copy of your discharge instructions?: Yes  Do you have all of your prescriptions and are they filled?: Yes  Have you been contacted by a [facility] Pharmacist?: No  Have you scheduled your follow up appointment?: Yes  How are you going to get to your appointment?: Car - family or friend to transport  Do you have support at home?: Alone -sister with patient during recovering for assistance as needed.   Do you feel like you have everything you need to keep you well at home?: Yes  Are you an active caregiver in your home?: No        Follow Up  Future Appointments   Date Time Provider Homa Mnia   10/28/2022  3:00 PM Pankaj Estrada MD [facility] AMB   11/7/2022  2:00 PM Amy Dobson RN [facility]   11/8/2022  2:00 PM MD AZRA Hernandez [facility] AMB   1/13/2023  9:00 AM Quintin Celeste MD [facility]       Care Transition Nurse provided contact information. Plan for follow-up call in 5-7 days based on severity of symptoms and risk factors.   Plan for next call: symptom management    Bernie Dyson RN

## 2022-10-28 ENCOUNTER — OFFICE VISIT (OUTPATIENT)
Dept: CARDIOLOGY CLINIC | Age: 62
End: 2022-10-28
Payer: COMMERCIAL

## 2022-10-28 VITALS
HEART RATE: 64 BPM | DIASTOLIC BLOOD PRESSURE: 70 MMHG | SYSTOLIC BLOOD PRESSURE: 128 MMHG | BODY MASS INDEX: 26.65 KG/M2 | HEIGHT: 66 IN | WEIGHT: 165.8 LBS

## 2022-10-28 DIAGNOSIS — E78.2 MIXED HYPERLIPIDEMIA: ICD-10-CM

## 2022-10-28 DIAGNOSIS — Z95.1 S/P CABG X 4: ICD-10-CM

## 2022-10-28 DIAGNOSIS — I25.10 CAD, MULTIPLE VESSEL: Primary | ICD-10-CM

## 2022-10-28 DIAGNOSIS — I10 ESSENTIAL HYPERTENSION: ICD-10-CM

## 2022-10-28 PROCEDURE — 99496 TRANSJ CARE MGMT HIGH F2F 7D: CPT | Performed by: INTERNAL MEDICINE

## 2022-10-28 ASSESSMENT — ENCOUNTER SYMPTOMS: SHORTNESS OF BREATH: 0

## 2022-10-28 NOTE — PROGRESS NOTES
[address], [address] [facility], [address] [place] [address]  PHONE: [phone]    Brandon Marc.  1960      SUBJECTIVE:   Brandon Marte is a 60 y.o. male seen for a follow up visit regarding the following:     Chief Complaint   Patient presents with    Follow-up       HPI:    67-year-old male comes back for transitional care 7 visit follow-up of bypass surgery. He was sent to me for some symptoms I did a stress test on him which was abnormal catheterization revealed multivessel coronary disease. He had bypass surgery x4 with a LIMA to the LAD vein to the diagonal vein to marginal to PDA. He has been doing well since that he says he feels a lot better already. His soreness is gotten better and nurse called in for transitional care visit and is doing well      Past Medical History, Past Surgical History, Family history, Social History, and Medications were all reviewed with the patient today and updated as necessary.        No Known Allergies  Past Medical History:   Diagnosis Date    Abnormal finding on EKG 08/05/2020    low voltage and incomplete RBBB    Anxiety and depression     Bilateral foot pain     Chronic back pain     Chronic back pain     Coronary artery disease     scheduled for CABG 10/17/22    Diabetes ([facility] [address]     type 2- metformin- A1C 6.7 (10/13/22)- denies hypo episodes    Former smoker, stopped smoking in distant past     quit 2004-  0.5 ppd x 26 years    H/O echocardiogram 09/30/2022    Echo LVEF 50-55%    HTN (hypertension)     Hx stress fracture     left foot    Impingement syndrome of right shoulder 2020    Dr. Leroy Wilburn    Insomnia     Lumbar spinal stenosis     Periodontal disease due to type 2 diabetes mellitus ([facility] [address]     Dr. Jaymie Ramirez- dentist    Plantar fasciitis, bilateral     RLS (restless legs syndrome)      Past Surgical History:   Procedure Laterality Date    CARDIAC PROCEDURE N/A 9/30/2022    Left heart cath / coronary angiography performed by Isla Cheadle, MD at [facility] CARDIAC CATH LAB    CARDIAC PROCEDURE N/A 2022    Fractional flow reserve (FFR) performed by Lizzy Botello MD at [address] [place] [address] CATH LAB    CORONARY ARTERY BYPASS GRAFT N/A 10/17/2022    CABG CORONARY ARTERY BYPASS (CABG X4) LIMA / ENDOSCOPIC VEIN HARVEST - GREATER SAPHENOUS VEIN performed by Reyes Bergeron, MD at [facility] MAIN OR    TRANSESOPHAGEAL ECHOCARDIOGRAM N/A 10/17/2022    TRANSESOPHAGEAL ECHOCARDIOGRAM performed by Reyes Bergeron, MD at [facility] MAIN OR     Family History   Problem Relation Age of Onset    Stroke Mother     COPD Father     Coronary Art Dis Father     Diabetes Sister       Social History     Tobacco Use    Smoking status: Former     Packs/day: 0.50     Years: 26.00     Pack years: 13.00     Types: Cigarettes     Quit date:      Years since quittin.8    Smokeless tobacco: Never   Substance Use Topics    Alcohol use: Yes     Alcohol/week: 3.0 - 4.0 standard drinks     Types: 3 - 4 Cans of beer per week       ROS:    Review of Systems   Constitutional: Negative for decreased appetite. Cardiovascular:  Negative for chest pain, dyspnea on exertion, leg swelling, near-syncope and palpitations. Respiratory:  Negative for shortness of breath. Hematologic/Lymphatic: Negative for bleeding problem. PHYSICAL EXAM:    /70   Pulse 64   Ht 5' 6\" (1.676 m)   Wt 165 lb 12.8 oz (75.2 kg)   BMI 26.76 kg/m²        Wt Readings from Last 3 Encounters:   10/28/22 165 lb 12.8 oz (75.2 kg)   10/21/22 174 lb 12.8 oz (79.3 kg)   10/16/22 166 lb (75.3 kg)     BP Readings from Last 3 Encounters:   10/28/22 128/70   10/21/22 (!) 147/70   10/16/22 118/75         Physical Exam  Constitutional:       General: He is not in acute distress. Cardiovascular:      Rate and Rhythm: Normal rate and regular rhythm. Heart sounds: No murmur heard. No gallop.       Comments: midline sternotomy incision looks like it is healing well  Pulmonary:      Effort: Pulmonary effort is normal.      Breath

## 2022-10-31 ENCOUNTER — CARE COORDINATION (OUTPATIENT)
Dept: CARE COORDINATION | Facility: CLINIC | Age: 62
End: 2022-10-31

## 2022-10-31 NOTE — CARE COORDINATION
[facility] Care Transitions Follow Up Call    [facility] Care Coordinator contacted the patient by telephone to follow up after admission on 10/17/22. Verified name and  with patient as identifiers. Patient: Stefano Mast Patient : 1960   MRN: [id number]  Reason for Admission: s/p CABG x 4  Discharge Date: 10/21/22 RARS: Readmission Risk Score: 11      Needs to be reviewed by the provider   Additional needs identified to be addressed with provider: No  none             Method of communication with provider: none. Patient reports feeling much better. 7 day follow up completed with Cardiology. Patient on schedule for initial cardiac rehab consult on 22. Follow up with thoracic surgery on 22. Patient denies any questions or concerns this day. Addressed changes since last contact:  none  Discussed follow-up appointments. If no appointment was previously scheduled, appointment scheduling offered: n/a. Is follow up appointment scheduled within 7 days of discharge? 7 day f/u completed. Follow Up  Future Appointments   Date Time Provider Homa Mina   2022  2:00 PM Kathy Mcgarry RN [facility]   2022  2:00 PM Dudley Peck MD [facility] AMB   2022  2:30 PM Jessica Carter MD [facility] AMB   2023  9:00 AM Мария Dash MD [facility] Care Coordinator reviewed discharge instructions, medical action plan, and red flags with patient and discussed any barriers to care and/or understanding of plan of care after discharge. Discussed appropriate site of care based on symptoms and resources available to patient including: PCP  Specialist  When to call [address] The patient agrees to contact the PCP office for questions related to their healthcare. Advance Care Planning:   decision maker updated.      Patients top risk factors for readmission:  s/p CABG x4  Interventions to address risk factors: Obtained and reviewed discharge summary and/or continuity of care documents  Reviewed f/u appointments      Offered patient enrollment in the Remote Patient Monitoring (RPM) program for in-home monitoring: NA.     Care Transitions Subsequent and Final Call    Subsequent and Final Calls  Do you have any ongoing symptoms?: No  Have your medications changed?: No  Do you have any questions related to your medications?: No  Do you currently have any active services?: No  Do you have any needs or concerns that I can assist you with?: No  Identified Barriers: None  Care Transitions Interventions  No Identified Needs  Other Interventions:             LPN Care Coordinator provided contact information for future needs. Plan for follow-up call in 7-10 days based on severity of symptoms and risk factors.   Plan for next call: symptom management    Hyun Zimmerman LPN

## 2022-11-07 ENCOUNTER — HOSPITAL ENCOUNTER (OUTPATIENT)
Dept: CARDIAC REHAB | Age: 62
Setting detail: RECURRING SERIES
Discharge: HOME | End: 2022-11-10
Payer: COMMERCIAL

## 2022-11-07 ENCOUNTER — CARE COORDINATION (OUTPATIENT)
Dept: CARE COORDINATION | Facility: CLINIC | Age: 62
End: 2022-11-07

## 2022-11-07 ASSESSMENT — PATIENT HEALTH QUESTIONNAIRE - PHQ9
9. THOUGHTS THAT YOU WOULD BE BETTER OFF DEAD, OR OF HURTING YOURSELF: 0
3. TROUBLE FALLING OR STAYING ASLEEP: 3
6. FEELING BAD ABOUT YOURSELF - OR THAT YOU ARE A FAILURE OR HAVE LET YOURSELF OR YOUR FAMILY DOWN: 0
10. IF YOU CHECKED OFF ANY PROBLEMS, HOW DIFFICULT HAVE THESE PROBLEMS MADE IT FOR YOU TO DO YOUR WORK, TAKE CARE OF THINGS AT HOME, OR GET ALONG WITH OTHER PEOPLE: 0
2. FEELING DOWN, DEPRESSED OR HOPELESS: 0
SUM OF ALL RESPONSES TO PHQ9 QUESTIONS 1 & 2: 0
SUM OF ALL RESPONSES TO PHQ QUESTIONS 1-9: 4
8. MOVING OR SPEAKING SO SLOWLY THAT OTHER PEOPLE COULD HAVE NOTICED. OR THE OPPOSITE, BEING SO FIGETY OR RESTLESS THAT YOU HAVE BEEN MOVING AROUND A LOT MORE THAN USUAL: 0
4. FEELING TIRED OR HAVING LITTLE ENERGY: 1
SUM OF ALL RESPONSES TO PHQ QUESTIONS 1-9: 4
7. TROUBLE CONCENTRATING ON THINGS, SUCH AS READING THE NEWSPAPER OR WATCHING TELEVISION: 0
SUM OF ALL RESPONSES TO PHQ QUESTIONS 1-9: 4
1. LITTLE INTEREST OR PLEASURE IN DOING THINGS: 0
5. POOR APPETITE OR OVEREATING: 0
SUM OF ALL RESPONSES TO PHQ QUESTIONS 1-9: 4

## 2022-11-07 ASSESSMENT — LIFESTYLE VARIABLES
SMOKELESS_TOBACCO: NO
ALCOHOL_TYPE: BEER
ALCOHOL_USE: WEEKLY
ALCOHOL_AMOUNT: 2 BEERS PER WEEK
CIGARETTES_PER_DAY: 0.5PPD

## 2022-11-07 ASSESSMENT — EJECTION FRACTION: EF_VALUE: 50

## 2022-11-07 NOTE — CARE COORDINATION
[facility] Care Transitions Follow Up Call    N Care Coordinator contacted the patient by telephone to follow up after admission on 10/17/22. Verified name and  with patient as identifiers. Patient: Nolan Lou Patient : 1960   MRN: [id number]  Reason for Admission: CABG x4  Discharge Date: 10/21/22 RARS: Readmission Risk Score: 11      Needs to be reviewed by the provider   Additional needs identified to be addressed with provider: No  none             Method of communication with provider: none. Patient states \"I am doing great! \"   Patient reports going to Cardiac Rehab this day and has been walking daily in his neighborhood. Patient denies any questions or concerns at time of call. Addressed changes since last contact:  none  Discussed follow-up appointments. If no appointment was previously scheduled, appointment scheduling offered: n/a. Is follow up appointment scheduled within 7 days of discharge? 7 d follow up completed. Follow Up  Future Appointments   Date Time Provider Homa Mina   2022  2:00 PM Sean Diego RN [facility]   2022  2:00 PM Delroy Cho MD [facility] AMB   2022  2:30 PM Saravanan Torre MD [facility] AMB   2023  9:00 AM Lety Martel MD [facility] Care Coordinator reviewed discharge instructions, medical action plan, and red flags with patient and discussed any barriers to care and/or understanding of plan of care after discharge. Discussed appropriate site of care based on symptoms and resources available to patient including: PCP  Specialist  Home health  When to call [address] The patient agrees to contact the PCP office for questions related to their healthcare. Advance Care Planning:   decision maker updated.      Patients top risk factors for readmission: s/p CABG x4  Interventions to address risk factors: Obtained and reviewed discharge summary and/or continuity of care documents  Reviewed f/u appointments   Goals Addressed                   This Visit's Progress     Returns to baseline activity level. On track     Patient/Family able to obtain medicine after d/c     Patient/Family able to verbalize medicine changes     Patient/Family aware and attends follow up appointments s/p d/c. Patient/Family to monitor for signs of infection: increasing redness, fever/hot to touch or purulent drainage at  site                       Offered patient enrollment in the Remote Patient Monitoring (RPM) program for in-home monitoring: NA.     Care Transitions Subsequent and Final Call    Schedule Follow Up Appointment with PCP: Completed  Subsequent and Final Calls  Do you have any ongoing symptoms?: No  Have your medications changed?: No  Do you have any questions related to your medications?: No  Do you currently have any active services?: Yes  Are you currently active with any services?: Home Health  Do you have any needs or concerns that I can assist you with?: No  Identified Barriers: None  Care Transitions Interventions  Other Interventions:             LPN Care Coordinator provided contact information for future needs. Plan for follow-up call in 7-10 days based on severity of symptoms and risk factors.   Plan for next call: symptom management    Simin Patrick LPN

## 2022-11-07 NOTE — PROGRESS NOTES
Dear Dr. Lindsey Fay,    Thank you for referring your patient,Mr. Alvaro Villarreal ( 1960), to the Cardiopulmonary Rehabilitation Program at [facility]. He is a good candidate for the Cardiac Rehab Program and should see improvements with regular participation. We will be addressing appropriate interventions for modifiable risk factors with your patient during the next 12 weeks. We will contact you with any issues or concerns that may arise, or you can follow your patient's progress through [address] [place] [address] at any time. A final summary will be sent to you when the program is completed. Again, thank you for the referral. If we can be of further assistance, please feel free to contact the Cardiopulmonary Rehab staff at [phone].     Sincerely,    ELIECER Hager, RN  Cardiopulmonary Rehabilitation Nurse Liaison  [facility]

## 2022-11-08 ENCOUNTER — OFFICE VISIT (OUTPATIENT)
Dept: CARDIOTHORACIC SURGERY | Age: 62
End: 2022-11-08

## 2022-11-08 VITALS
BODY MASS INDEX: 26.52 KG/M2 | SYSTOLIC BLOOD PRESSURE: 130 MMHG | HEART RATE: 76 BPM | WEIGHT: 165 LBS | HEIGHT: 66 IN | DIASTOLIC BLOOD PRESSURE: 76 MMHG

## 2022-11-08 DIAGNOSIS — Z95.1 S/P CABG X 4: Primary | ICD-10-CM

## 2022-11-08 PROCEDURE — 99024 POSTOP FOLLOW-UP VISIT: CPT | Performed by: PHYSICIAN ASSISTANT

## 2022-11-08 NOTE — PROGRESS NOTES
[address] [facility]  Olya Crockett. MD Gokul Luu. Shikha Nuno MD          [address] [place] [address].       [id number]  11/8/2022 1960      [address] [place] [address]. is seen today for follow-up status post CABG X 4 with LIMA to the LAD, reverse SVG to the diagonal, reverse SVG to the intermediate coronary and reverse SVG to the PDA on 10/17/22. Post op, he was hypotensive. BP improved and he was transferred to [facility]. He was weaned off of O2. He progressed well with PT. Appetite improved. BP continued to improve. He was discharged home on 10/21/22. he is very active and ambulatory. He walked 1 1/4 miles today already. There is no fever or shortness of breath. Appetite is good. Pain has improved. Pt is sleeping ok, sleeps 2-3 hours at a time but has chronic insomnia. EXAM:  Vitals:  Blood pressure 130/76, pulse 76, height 5' 6\" (1.676 m), weight 165 lb (74.8 kg). Lungs:  Clear to auscultation bilaterally. Heart: Regular rate and rhythm without murmurs. Incision: Sternum stable. Incision healing well. Leg incisions healing well. IMPRESSION and PLAN:  DC from CT surgery. Pt may drive. Pt is planning to begin cardiac rehab. Sternal precautions: Pt advised to avoid any heavy lifting for another 4 weeks but can increase activity as tolerated. Pt has cardiology follow-up with Dr. Logan Mcdaniels. No need to schedule follow-up at this point but the patient may call or return anytime if issues or questions arise. Mai Felder.  KRISTINE Shook  11/8/2022 2:03 PM

## 2022-11-11 ENCOUNTER — HOSPITAL ENCOUNTER (OUTPATIENT)
Dept: CARDIAC REHAB | Age: 62
Setting detail: RECURRING SERIES
Discharge: HOME | End: 2022-11-14
Payer: COMMERCIAL

## 2022-11-11 VITALS — WEIGHT: 161.6 LBS | BODY MASS INDEX: 25.97 KG/M2 | OXYGEN SATURATION: 100 % | HEIGHT: 66 IN

## 2022-11-11 PROCEDURE — 93798 PHYS/QHP OP CAR RHAB W/ECG: CPT

## 2022-11-11 ASSESSMENT — LIFESTYLE VARIABLES
ALCOHOL_AMOUNT: 2 BEERS PER WEEK
ALCOHOL_USE: WEEKLY
SMOKELESS_TOBACCO: NO
CIGARETTES_PER_DAY: 0.5PPD
ALCOHOL_TYPE: BEER

## 2022-11-11 ASSESSMENT — EXERCISE STRESS TEST
PEAK_BP: 144/62
PEAK_BP: 144/62
PEAK_HR: 87
PEAK_BP: 144/62
PEAK_HR: 87
PEAK_RPE: 7
PEAK_METS: 2.3

## 2022-11-11 ASSESSMENT — EJECTION FRACTION
EF_VALUE: 50
EF_VALUE: 50

## 2022-11-14 ENCOUNTER — HOSPITAL ENCOUNTER (OUTPATIENT)
Dept: CARDIAC REHAB | Age: 62
Setting detail: RECURRING SERIES
Discharge: HOME | End: 2022-11-17
Payer: COMMERCIAL

## 2022-11-14 ENCOUNTER — CARE COORDINATION (OUTPATIENT)
Dept: CARE COORDINATION | Facility: CLINIC | Age: 62
End: 2022-11-14

## 2022-11-14 PROCEDURE — 93798 PHYS/QHP OP CAR RHAB W/ECG: CPT

## 2022-11-14 ASSESSMENT — EXERCISE STRESS TEST
PEAK_HR: 81
PEAK_METS: 2.4
PEAK_BP: 110/68

## 2022-11-14 NOTE — CARE COORDINATION
[facility] Care Transitions Follow Up Call    Care Transition Nurse contacted the patient by telephone to follow up after admission on 10/17/2022. Verified name and  with patient as identifiers. Patient: Albin Cosby. Patient : 1960   MRN: [id number]  Reason for Admission: CABG x4  Discharge Date: 10/21/22 RARS: Readmission Risk Score: 11      Needs to be reviewed by the provider   Additional needs identified to be addressed with provider: No  none             Method of communication with provider: none. Addressed changes since last contact:   Patient reports doing well with  no issues at time of call. Scheduled to begin Cardio Rehab today. Agrees to contact CTN with any issues prior to next outreach   Discussed follow-up appointments. If no appointment was previously scheduled, appointment scheduling offered: n/a. Is follow up appointment scheduled within 7 days of discharge? Yes.     Follow Up  Future Appointments   Date Time Provider Homa Mina   2022  1:00 PM Tedra Montesano [facility]   2022  1:00 PM Tedra Loc [facility]   2022  1:00 PM Tedra Montesano [facility]   2022  1:00 PM Tedra Loc [facility]   2022  1:00 PM Tedra Montesano [facility]   2022  1:00 PM Tedra Loc [facility]   2022  1:00 PM Tedra Loc [facility]   2022  1:00 PM Tedra Montesano [facility]   2022  1:00 PM Tedra Loc [facility]   2022  1:00 PM Tedra Montesano [facility]   2022  1:00 PM Tedra Montesano [facility]   2022  2:30 PM Severiano Fate, MD [facility]   2022  1:00 PM Tedra Loc [facility]   2022  1:00 PM Tedra Loc [facility]   2022  1:00 PM Tedra Loc [facility]   2022  1:00 PM Tedra Montesano [facility] O   2022  1:00 PM More Monterroso [facility] O   2022  1:00 PM More Monterroso [facility] O   2022  1:00 PM Danay Jerome [facility] O   2022  1:00 PM Melvi [facility]   1/13/2023  9:00 AM Marjorie Albert MD [facility]     Non-[facility] follow up appointment(s): n/a    Care Transition Nurse reviewed medical action plan and red flags with patient and discussed any barriers to care and/or understanding of plan of care after discharge. Discussed appropriate site of care based on symptoms and resources available to patient including: PCP  Specialist  Urgent care clinics  [facility] 24/7. The patient agrees to contact the PCP office for questions related to their healthcare. Advance Care Planning:   decision maker updated. Patients top risk factors for readmission:  CABGx4  Interventions to address risk factors:  Continue taking all medications as ordered  Continue with adequate nutrition and fluid intake  Attend Cardio Rehab as scheduled   Goals Addressed                   This Visit's Progress     Returns to baseline activity level. On track     Patient/Family able to obtain medicine after d/c     Patient/Family able to verbalize medicine changes     Patient/Family aware and attends follow up appointments s/p d/c. Patient/Family to monitor for signs of infection: increasing redness, fever/hot to touch or purulent drainage at  site                Offered patient enrollment in the Remote Patient Monitoring (RPM) program for in-home monitoring: NA.     Care Transitions Subsequent and Final Call    Schedule Follow Up Appointment with PCP: Completed  Subsequent and Final Calls  Do you have any ongoing symptoms?: No  Have your medications changed?: No  Do you have any questions related to your medications?: No  Do you currently have any active services?: Yes  Are you currently active with any services?: Home Health, Other  Do you have any needs or concerns that I can assist you with?: No        Care Transition Nurse provided contact information for future needs.  Plan for follow-up call in 7-10 days based on severity of symptoms and risk factors.   Plan for next call: self management    Elo Argueta RN

## 2022-11-16 ENCOUNTER — HOSPITAL ENCOUNTER (OUTPATIENT)
Dept: CARDIAC REHAB | Age: 62
Setting detail: RECURRING SERIES
Discharge: HOME | End: 2022-11-19
Payer: COMMERCIAL

## 2022-11-16 VITALS — WEIGHT: 160 LBS | BODY MASS INDEX: 25.82 KG/M2

## 2022-11-16 PROCEDURE — 93798 PHYS/QHP OP CAR RHAB W/ECG: CPT

## 2022-11-16 ASSESSMENT — EXERCISE STRESS TEST
PEAK_METS: 3
PEAK_HR: 89
PEAK_BP: 118/60

## 2022-11-18 ENCOUNTER — HOSPITAL ENCOUNTER (OUTPATIENT)
Dept: CARDIAC REHAB | Age: 62
Setting detail: RECURRING SERIES
Discharge: HOME | End: 2022-11-21
Payer: COMMERCIAL

## 2022-11-18 PROCEDURE — 93798 PHYS/QHP OP CAR RHAB W/ECG: CPT

## 2022-11-18 ASSESSMENT — EXERCISE STRESS TEST
PEAK_BP: 126/66
PEAK_HR: 95
PEAK_METS: 3.5

## 2022-11-21 ENCOUNTER — PATIENT MESSAGE (OUTPATIENT)
Dept: CARDIOLOGY CLINIC | Age: 62
End: 2022-11-21

## 2022-11-21 ENCOUNTER — TELEPHONE (OUTPATIENT)
Dept: CARDIOLOGY CLINIC | Age: 62
End: 2022-11-21

## 2022-11-21 ENCOUNTER — HOSPITAL ENCOUNTER (OUTPATIENT)
Dept: CARDIAC REHAB | Age: 62
Setting detail: RECURRING SERIES
Discharge: HOME | End: 2022-11-24
Payer: COMMERCIAL

## 2022-11-21 PROCEDURE — 93798 PHYS/QHP OP CAR RHAB W/ECG: CPT

## 2022-11-21 ASSESSMENT — EXERCISE STRESS TEST
PEAK_METS: 3.5
PEAK_HR: 114
PEAK_BP: 106/60

## 2022-11-21 NOTE — TELEPHONE ENCOUNTER
----- Message from Issac Reynoso sent at 11/21/2022  4:25 PM EST -----  Regarding: FW: pain on R side chest     ----- Message -----  From: Albin Cosby. Sent: 11/21/2022   4:18 PM EST  To: [facility] Clinical Staff  Subject: pain on R side chest                             seems to have increased while moving around. When laying down & sleeping no pain.  Just wondering if this is normal?

## 2022-11-21 NOTE — CARDIO/PULMONARY
1121/2022 Patient states since surgery he's noticed chest wall pain, specifically R side under pectoral muscle having increasing pain, 6/10 at times. Patient is following sternal precautions, avoiding arm exercises and reports no pain during exercise in Cardiac Rehab but states while walking, active and breathing at random the pain has gotten worse over time. No pain while resting or lying. Advised patient to notify cardiologist, patient verbalized understanding. /72, RHR 73, Sinus Rhythm and achieved 3.5 METs and 47 minutes of exercise today.  For your review, thank you. Jumana Huynh

## 2022-11-21 NOTE — TELEPHONE ENCOUNTER
Pt c/o right sided CP under his cazares; worse with a deep breath and when he reaches and sneezes. Had CABG surgery in October. Tylenol does not relieve pain completely. Pt states he is able to do his cardiac rehab without any problems of CP. Triage informed pt symptoms sound more musculoskeletal in nature than cardiac. Informed pt he may take occasional ibuprofen to help with pain and call our office back with worsening symptoms. Pt verbalizes understanding and agrees to plan.

## 2022-11-23 ENCOUNTER — CARE COORDINATION (OUTPATIENT)
Dept: CARE COORDINATION | Facility: CLINIC | Age: 62
End: 2022-11-23

## 2022-11-23 ENCOUNTER — HOSPITAL ENCOUNTER (OUTPATIENT)
Dept: CARDIAC REHAB | Age: 62
Setting detail: RECURRING SERIES
Discharge: HOME | End: 2022-11-26
Payer: COMMERCIAL

## 2022-11-23 VITALS — BODY MASS INDEX: 26.08 KG/M2 | WEIGHT: 161.6 LBS

## 2022-11-23 PROCEDURE — 93798 PHYS/QHP OP CAR RHAB W/ECG: CPT

## 2022-11-23 ASSESSMENT — EXERCISE STRESS TEST
PEAK_BP: 136/66
PEAK_METS: 4.6
PEAK_HR: 103

## 2022-11-23 NOTE — CARE COORDINATION
Patient has graduated from the Care Transitions program on 11/23/2022. Patient/family has the ability to self-manage at this time. Patient has no further care management needs, no referral to the [facility] team for further management. Patient has Care Transition Nurse's contact information for any further questions, concerns, or needs.   Patients upcoming visits:    Future Appointments   Date Time Provider Homa Mina   11/23/2022  1:00 PM Randall Prost [facility]   11/28/2022  1:00 PM Randall Prost [facility]   11/30/2022  1:00 PM Randall Prost [facility]   12/2/2022  1:00 PM Randall Prost [facility]   12/5/2022  1:00 PM Randall Prost [facility]   12/7/2022  1:00 PM Randall Prost [facility]   12/9/2022  1:00 PM Randall Prost [facility]   12/9/2022  2:30 PM MD TAQUERIA Burnette [facility]   12/12/2022  1:00 PM Randall Prost [facility]   12/14/2022  1:00 PM Randall Prost [facility]   12/16/2022  1:00 PM Randall Prost [facility]   12/19/2022  1:00 PM Randall Prost [facility]   12/21/2022  1:00 PM Randall Prost [facility]   12/23/2022  1:00 PM Randall Prost [facility]   12/28/2022  1:00 PM Randall Prost [facility]   12/30/2022  1:00 PM Randall Prost [facility]   1/13/2023  9:00 AM MD ANNIE Owens [facility]

## 2022-11-28 ENCOUNTER — HOSPITAL ENCOUNTER (OUTPATIENT)
Dept: CARDIAC REHAB | Age: 62
Setting detail: RECURRING SERIES
Discharge: HOME | End: 2022-12-01
Payer: COMMERCIAL

## 2022-11-28 PROCEDURE — 93798 PHYS/QHP OP CAR RHAB W/ECG: CPT

## 2022-11-28 ASSESSMENT — EXERCISE STRESS TEST
PEAK_BP: 114/58
PEAK_HR: 112
PEAK_METS: 4.6

## 2022-11-30 ENCOUNTER — HOSPITAL ENCOUNTER (OUTPATIENT)
Dept: CARDIAC REHAB | Age: 62
Setting detail: RECURRING SERIES
Discharge: HOME | End: 2022-12-03
Payer: COMMERCIAL

## 2022-11-30 VITALS — BODY MASS INDEX: 26.47 KG/M2 | WEIGHT: 164 LBS

## 2022-11-30 PROCEDURE — 93798 PHYS/QHP OP CAR RHAB W/ECG: CPT

## 2022-11-30 ASSESSMENT — EXERCISE STRESS TEST
PEAK_METS: 4.6
PEAK_HR: 116

## 2022-12-02 ENCOUNTER — HOSPITAL ENCOUNTER (OUTPATIENT)
Dept: CARDIAC REHAB | Age: 62
Setting detail: RECURRING SERIES
Discharge: HOME | End: 2022-12-05
Payer: COMMERCIAL

## 2022-12-02 PROCEDURE — 93798 PHYS/QHP OP CAR RHAB W/ECG: CPT

## 2022-12-02 ASSESSMENT — EXERCISE STRESS TEST
PEAK_BP: 118/56
PEAK_METS: 4.6
PEAK_HR: 125

## 2022-12-05 ENCOUNTER — HOSPITAL ENCOUNTER (OUTPATIENT)
Dept: CARDIAC REHAB | Age: 62
Setting detail: RECURRING SERIES
Discharge: HOME | End: 2022-12-08
Payer: COMMERCIAL

## 2022-12-05 PROCEDURE — 93798 PHYS/QHP OP CAR RHAB W/ECG: CPT

## 2022-12-05 ASSESSMENT — EXERCISE STRESS TEST
PEAK_HR: 120
PEAK_METS: 5.3

## 2022-12-07 ENCOUNTER — HOSPITAL ENCOUNTER (OUTPATIENT)
Dept: CARDIAC REHAB | Age: 62
Setting detail: RECURRING SERIES
Discharge: HOME | End: 2022-12-10
Payer: COMMERCIAL

## 2022-12-07 VITALS — WEIGHT: 163.3 LBS | BODY MASS INDEX: 26.36 KG/M2

## 2022-12-07 PROCEDURE — 93798 PHYS/QHP OP CAR RHAB W/ECG: CPT

## 2022-12-07 ASSESSMENT — EXERCISE STRESS TEST
PEAK_METS: 5.3
PEAK_BP: 114/60
PEAK_HR: 126

## 2022-12-08 ASSESSMENT — LIFESTYLE VARIABLES
CIGARETTES_PER_DAY: 0.5PPD
ALCOHOL_USE: WEEKLY
ALCOHOL_AMOUNT: 2 BEERS PER WEEK
ALCOHOL_TYPE: BEER
SMOKELESS_TOBACCO: NO

## 2022-12-08 ASSESSMENT — EXERCISE STRESS TEST: PEAK_BP: 114/60

## 2022-12-08 ASSESSMENT — EJECTION FRACTION: EF_VALUE: 50

## 2022-12-09 ENCOUNTER — HOSPITAL ENCOUNTER (OUTPATIENT)
Dept: CARDIAC REHAB | Age: 62
Setting detail: RECURRING SERIES
Discharge: HOME | End: 2022-12-12
Payer: COMMERCIAL

## 2022-12-09 ENCOUNTER — OFFICE VISIT (OUTPATIENT)
Dept: CARDIOLOGY CLINIC | Age: 62
End: 2022-12-09
Payer: COMMERCIAL

## 2022-12-09 VITALS
SYSTOLIC BLOOD PRESSURE: 110 MMHG | HEART RATE: 64 BPM | WEIGHT: 170 LBS | HEIGHT: 66 IN | DIASTOLIC BLOOD PRESSURE: 62 MMHG | BODY MASS INDEX: 27.32 KG/M2

## 2022-12-09 DIAGNOSIS — I25.10 CAD, MULTIPLE VESSEL: ICD-10-CM

## 2022-12-09 DIAGNOSIS — Z95.1 S/P CABG X 4: ICD-10-CM

## 2022-12-09 DIAGNOSIS — I10 ESSENTIAL HYPERTENSION: ICD-10-CM

## 2022-12-09 DIAGNOSIS — E78.2 MIXED HYPERLIPIDEMIA: Primary | ICD-10-CM

## 2022-12-09 PROCEDURE — 93798 PHYS/QHP OP CAR RHAB W/ECG: CPT

## 2022-12-09 PROCEDURE — 99213 OFFICE O/P EST LOW 20 MIN: CPT | Performed by: INTERNAL MEDICINE

## 2022-12-09 PROCEDURE — G8428 CUR MEDS NOT DOCUMENT: HCPCS | Performed by: INTERNAL MEDICINE

## 2022-12-09 PROCEDURE — 3074F SYST BP LT 130 MM HG: CPT | Performed by: INTERNAL MEDICINE

## 2022-12-09 PROCEDURE — G8484 FLU IMMUNIZE NO ADMIN: HCPCS | Performed by: INTERNAL MEDICINE

## 2022-12-09 PROCEDURE — G8419 CALC BMI OUT NRM PARAM NOF/U: HCPCS | Performed by: INTERNAL MEDICINE

## 2022-12-09 PROCEDURE — 3017F COLORECTAL CA SCREEN DOC REV: CPT | Performed by: INTERNAL MEDICINE

## 2022-12-09 PROCEDURE — 3078F DIAST BP <80 MM HG: CPT | Performed by: INTERNAL MEDICINE

## 2022-12-09 PROCEDURE — 1036F TOBACCO NON-USER: CPT | Performed by: INTERNAL MEDICINE

## 2022-12-09 RX ORDER — METOPROLOL SUCCINATE 25 MG/1
25 TABLET, EXTENDED RELEASE ORAL EVERY MORNING
Qty: 90 TABLET | Refills: 3 | Status: SHIPPED | OUTPATIENT
Start: 2022-12-09

## 2022-12-09 ASSESSMENT — ENCOUNTER SYMPTOMS: SHORTNESS OF BREATH: 0

## 2022-12-09 ASSESSMENT — EXERCISE STRESS TEST
PEAK_HR: 133
PEAK_METS: 5.3

## 2022-12-09 NOTE — PROGRESS NOTES
[address], [address] [facility], [address] [place] [address]  PHONE: [phone]    Eliud Busby.  1960      SUBJECTIVE:   Eliud Yip is a 60 y.o. male seen for a follow up visit regarding the following:     Chief Complaint   Patient presents with    Hypertension       HPI:    58-year-old male comes back for follow-up of his multivessel CAD post CABG. He is doing very well with going to rehab is working out a lot. Did have some chest soreness on the right side but that is better now. Is not having any other probably can walk better than he did before. He is ready go back to work      Past Medical History, Past Surgical History, Family history, Social History, and Medications were all reviewed with the patient today and updated as necessary.        Allergies   Allergen Reactions    Other      Dial soap caused rash     Past Medical History:   Diagnosis Date    Abnormal finding on EKG 08/05/2020    low voltage and incomplete RBBB    Anxiety and depression     Bilateral foot pain     Chronic back pain     Chronic back pain     Coronary artery disease     scheduled for CABG 10/17/22    Diabetes ([facility] [address]     type 2- metformin- A1C 6.7 (10/13/22)- denies hypo episodes    Former smoker, stopped smoking in distant past     quit 2004-  0.5 ppd x 26 years    H/O echocardiogram 09/30/2022    Echo LVEF 50-55%    HTN (hypertension)     Hx stress fracture     left foot    Impingement syndrome of right shoulder 2020    Dr. Stephanie Reid    Insomnia     Lumbar spinal stenosis     Periodontal disease due to type 2 diabetes mellitus ([facility] [address]     Dr. Halina Johnson- dentist    Plantar fasciitis, bilateral     RLS (restless legs syndrome)      Past Surgical History:   Procedure Laterality Date    CARDIAC PROCEDURE N/A 9/30/2022    Left heart cath / coronary angiography performed by Gwyn Mejia MD at [address] [place] [address] N/A 9/30/2022    Fractional flow reserve (FFR) performed by Gwyn Mejia MD at [facility] CARDIAC CATH LAB    CORONARY ARTERY BYPASS GRAFT N/A 10/17/2022    CABG CORONARY ARTERY BYPASS (CABG X4) LIMA / ENDOSCOPIC VEIN HARVEST - GREATER SAPHENOUS VEIN performed by Art Orozco MD at [facility] MAIN OR    TRANSESOPHAGEAL ECHOCARDIOGRAM N/A 10/17/2022    TRANSESOPHAGEAL ECHOCARDIOGRAM performed by Art Orozco MD at [facility] MAIN OR     Family History   Problem Relation Age of Onset    Stroke Mother     COPD Father     Coronary Art Dis Father     Diabetes Sister       Social History     Tobacco Use    Smoking status: Former     Packs/day: 0.50     Years: 26.00     Pack years: 13.00     Types: Cigarettes     Start date: 1978     Quit date: 2004     Years since quittin.9    Smokeless tobacco: Never   Substance Use Topics    Alcohol use: Not on file       ROS:    Review of Systems   Constitutional: Negative for decreased appetite. Cardiovascular:  Negative for chest pain, dyspnea on exertion and leg swelling. Respiratory:  Negative for shortness of breath. PHYSICAL EXAM:    /62   Pulse 64   Ht 5' 6\" (1.676 m)   Wt 170 lb (77.1 kg)   BMI 27.44 kg/m²        Wt Readings from Last 3 Encounters:   22 170 lb (77.1 kg)   22 163 lb 4.8 oz (74.1 kg)   22 164 lb (74.4 kg)     BP Readings from Last 3 Encounters:   22 110/62   22 130/76   10/28/22 128/70         Physical Exam  Constitutional:       General: He is not in acute distress. Cardiovascular:      Rate and Rhythm: Normal rate and regular rhythm. Heart sounds: No murmur heard. No gallop. Pulmonary:      Effort: Pulmonary effort is normal.      Breath sounds: Normal breath sounds. Musculoskeletal:         General: No swelling. Neurological:      Mental Status: He is alert. Medical problems and test results were reviewed with the patient today. No results found for any visits on 22.   Lab Results   Component Value Date/Time     10/21/2022 07:19 AM    K 4.2 10/21/2022 07:19 AM     10/21/2022 07:19 AM    CO2 29 10/21/2022 07:19 AM    BUN 16 10/21/2022 07:19 AM    GFRAA >60 09/28/2022 12:37 PM     Lab Results   Component Value Date/Time    CHOL 120 07/08/2022 09:44 AM    HDL 29 07/08/2022 09:44 AM    VLDL 19 01/07/2022 10:45 AM         ASSESSMENT and PLAN    Yancy Kessler was seen today for hypertension. Diagnoses and all orders for this visit:    Mixed hyperlipidemia continue high-dose statin therapy we will check a lipid profile in the next year in January. I think overall stable  -     Lipid Panel; Future    S/P CABG x 4 he is doing well post CABG has had some soreness but that seems to be better as usual.  I will get approval as well to    Essential hypertension his blood pressure looks stable. He will stay on his meds    CAD, multiple vessel currently stable. Other orders  -     metoprolol succinate (TOPROL XL) 25 MG extended release tablet; Take 1 tablet by mouth every morning  Surgery back in September overall he is stable to return to work. [unfilled]      No follow-up provider specified.     Aguila Ozuna MD  12/9/2022  2:57 PM

## 2022-12-09 NOTE — LETTER
[facility] CARDIOLOGY  [address] [place] [address]  [address] [place] [address]  Phone: [phone]  Fax: [phone]    MARY Reeder MD        December 9, 2022     Patient: Yani Valdez. YOB: 1960   Date of Visit: 12/9/2022       To Whom It May Concern: It is my medical opinion that Yuliya Suazo may return to full duty immediately with no restrictions. If you have any questions or concerns, please don't hesitate to call.           Sincerely,        Aguila Ozuna MD

## 2022-12-12 ENCOUNTER — HOSPITAL ENCOUNTER (OUTPATIENT)
Dept: CARDIAC REHAB | Age: 62
Setting detail: RECURRING SERIES
Discharge: HOME | End: 2022-12-15
Payer: COMMERCIAL

## 2022-12-12 PROCEDURE — 93798 PHYS/QHP OP CAR RHAB W/ECG: CPT

## 2022-12-12 ASSESSMENT — EXERCISE STRESS TEST
PEAK_METS: 5.3
PEAK_HR: 125

## 2022-12-14 ENCOUNTER — HOSPITAL ENCOUNTER (OUTPATIENT)
Dept: CARDIAC REHAB | Age: 62
Setting detail: RECURRING SERIES
Discharge: HOME | End: 2022-12-17
Payer: COMMERCIAL

## 2022-12-14 VITALS — BODY MASS INDEX: 26.57 KG/M2 | WEIGHT: 164.6 LBS

## 2022-12-14 PROCEDURE — 93798 PHYS/QHP OP CAR RHAB W/ECG: CPT

## 2022-12-14 ASSESSMENT — EXERCISE STRESS TEST
PEAK_METS: 5.5
PEAK_BP: 130/64
PEAK_HR: 123

## 2022-12-16 ENCOUNTER — HOSPITAL ENCOUNTER (OUTPATIENT)
Dept: CARDIAC REHAB | Age: 62
Setting detail: RECURRING SERIES
Discharge: HOME | End: 2022-12-19
Payer: COMMERCIAL

## 2022-12-16 PROCEDURE — 93798 PHYS/QHP OP CAR RHAB W/ECG: CPT

## 2022-12-16 ASSESSMENT — EXERCISE STRESS TEST
PEAK_METS: 5.7
PEAK_HR: 118

## 2022-12-19 ENCOUNTER — HOSPITAL ENCOUNTER (OUTPATIENT)
Dept: CARDIAC REHAB | Age: 62
Setting detail: RECURRING SERIES
Discharge: HOME | End: 2022-12-22
Payer: COMMERCIAL

## 2022-12-19 PROCEDURE — 93798 PHYS/QHP OP CAR RHAB W/ECG: CPT

## 2022-12-19 ASSESSMENT — EXERCISE STRESS TEST
PEAK_BP: 142/58
PEAK_METS: 5.7
PEAK_HR: 118

## 2022-12-21 ENCOUNTER — HOSPITAL ENCOUNTER (OUTPATIENT)
Dept: CARDIAC REHAB | Age: 62
Setting detail: RECURRING SERIES
Discharge: HOME | End: 2022-12-24
Payer: COMMERCIAL

## 2022-12-21 VITALS — BODY MASS INDEX: 26.73 KG/M2 | WEIGHT: 165.6 LBS

## 2022-12-21 PROCEDURE — 93798 PHYS/QHP OP CAR RHAB W/ECG: CPT

## 2022-12-21 ASSESSMENT — EXERCISE STRESS TEST
PEAK_METS: 5.7
PEAK_HR: 129
PEAK_BP: 120/58

## 2022-12-23 ENCOUNTER — HOSPITAL ENCOUNTER (OUTPATIENT)
Dept: CARDIAC REHAB | Age: 62
Setting detail: RECURRING SERIES
Discharge: HOME | End: 2022-12-26
Payer: COMMERCIAL

## 2022-12-23 VITALS — WEIGHT: 164 LBS | BODY MASS INDEX: 26.47 KG/M2

## 2022-12-23 PROCEDURE — 93798 PHYS/QHP OP CAR RHAB W/ECG: CPT

## 2022-12-23 ASSESSMENT — EXERCISE STRESS TEST
PEAK_METS: 7
PEAK_BP: 146/60
PEAK_HR: 131

## 2022-12-28 ENCOUNTER — HOSPITAL ENCOUNTER (OUTPATIENT)
Dept: CARDIAC REHAB | Age: 62
Setting detail: RECURRING SERIES
Discharge: HOME | End: 2022-12-31
Payer: COMMERCIAL

## 2022-12-28 VITALS — WEIGHT: 167.2 LBS | BODY MASS INDEX: 26.99 KG/M2

## 2022-12-28 PROCEDURE — 93798 PHYS/QHP OP CAR RHAB W/ECG: CPT

## 2022-12-28 ASSESSMENT — EXERCISE STRESS TEST
PEAK_HR: 119
PEAK_BP: 152/70
PEAK_METS: 5.5

## 2022-12-30 ENCOUNTER — HOSPITAL ENCOUNTER (OUTPATIENT)
Dept: CARDIAC REHAB | Age: 62
Setting detail: RECURRING SERIES
End: 2022-12-30
Payer: COMMERCIAL

## 2022-12-30 DIAGNOSIS — E78.2 MIXED HYPERLIPIDEMIA: ICD-10-CM

## 2022-12-30 LAB
CHOLEST SERPL-MCNC: 99 MG/DL
HDLC SERPL-MCNC: 29 MG/DL (ref 40–60)
HDLC SERPL: 3.4
LDLC SERPL CALC-MCNC: 44.8 MG/DL
TRIGL SERPL-MCNC: 126 MG/DL (ref 35–150)
VLDLC SERPL CALC-MCNC: 25.2 MG/DL (ref 6–23)

## 2022-12-30 PROCEDURE — 93798 PHYS/QHP OP CAR RHAB W/ECG: CPT

## 2022-12-30 ASSESSMENT — PATIENT HEALTH QUESTIONNAIRE - PHQ9
2. FEELING DOWN, DEPRESSED OR HOPELESS: 0
7. TROUBLE CONCENTRATING ON THINGS, SUCH AS READING THE NEWSPAPER OR WATCHING TELEVISION: 0
SUM OF ALL RESPONSES TO PHQ QUESTIONS 1-9: 1
6. FEELING BAD ABOUT YOURSELF - OR THAT YOU ARE A FAILURE OR HAVE LET YOURSELF OR YOUR FAMILY DOWN: 0
4. FEELING TIRED OR HAVING LITTLE ENERGY: 0
SUM OF ALL RESPONSES TO PHQ QUESTIONS 1-9: 1
5. POOR APPETITE OR OVEREATING: 0
3. TROUBLE FALLING OR STAYING ASLEEP: 1
9. THOUGHTS THAT YOU WOULD BE BETTER OFF DEAD, OR OF HURTING YOURSELF: 0
1. LITTLE INTEREST OR PLEASURE IN DOING THINGS: 0
10. IF YOU CHECKED OFF ANY PROBLEMS, HOW DIFFICULT HAVE THESE PROBLEMS MADE IT FOR YOU TO DO YOUR WORK, TAKE CARE OF THINGS AT HOME, OR GET ALONG WITH OTHER PEOPLE: 0
8. MOVING OR SPEAKING SO SLOWLY THAT OTHER PEOPLE COULD HAVE NOTICED. OR THE OPPOSITE, BEING SO FIGETY OR RESTLESS THAT YOU HAVE BEEN MOVING AROUND A LOT MORE THAN USUAL: 0
SUM OF ALL RESPONSES TO PHQ QUESTIONS 1-9: 1
SUM OF ALL RESPONSES TO PHQ9 QUESTIONS 1 & 2: 0
SUM OF ALL RESPONSES TO PHQ QUESTIONS 1-9: 1

## 2022-12-30 ASSESSMENT — LIFESTYLE VARIABLES
CIGARETTES_PER_DAY: 0.5PPD
SMOKELESS_TOBACCO: NO
ALCOHOL_AMOUNT: 2 BEERS PER WEEK
ALCOHOL_TYPE: BEER
ALCOHOL_USE: WEEKLY

## 2022-12-30 ASSESSMENT — EXERCISE STRESS TEST
PEAK_METS: 7
PEAK_BP: 152/70
PEAK_HR: 133

## 2022-12-30 ASSESSMENT — EJECTION FRACTION: EF_VALUE: 50

## 2023-01-13 ENCOUNTER — OFFICE VISIT (OUTPATIENT)
Dept: INTERNAL MEDICINE CLINIC | Facility: CLINIC | Age: 63
End: 2023-01-13

## 2023-01-13 VITALS
HEIGHT: 66 IN | DIASTOLIC BLOOD PRESSURE: 70 MMHG | BODY MASS INDEX: 27.68 KG/M2 | SYSTOLIC BLOOD PRESSURE: 118 MMHG | WEIGHT: 172.2 LBS

## 2023-01-13 DIAGNOSIS — M54.50 LOW BACK PAIN POTENTIALLY ASSOCIATED WITH SPINAL STENOSIS: ICD-10-CM

## 2023-01-13 DIAGNOSIS — D64.9 ANEMIA, UNSPECIFIED TYPE: ICD-10-CM

## 2023-01-13 DIAGNOSIS — R19.5 HEME POSITIVE STOOL: ICD-10-CM

## 2023-01-13 DIAGNOSIS — M79.631 RIGHT FOREARM PAIN: ICD-10-CM

## 2023-01-13 DIAGNOSIS — I10 ESSENTIAL HYPERTENSION: ICD-10-CM

## 2023-01-13 DIAGNOSIS — K21.9 GASTROESOPHAGEAL REFLUX DISEASE WITHOUT ESOPHAGITIS: ICD-10-CM

## 2023-01-13 DIAGNOSIS — E11.9 CONTROLLED TYPE 2 DIABETES MELLITUS WITHOUT COMPLICATION, WITHOUT LONG-TERM CURRENT USE OF INSULIN (HCC): ICD-10-CM

## 2023-01-13 DIAGNOSIS — M79.641 RIGHT HAND PAIN: ICD-10-CM

## 2023-01-13 DIAGNOSIS — E13.42 OTHER SPECIFIED DIABETES MELLITUS WITH DIABETIC POLYNEUROPATHY, WITHOUT LONG-TERM CURRENT USE OF INSULIN (HCC): ICD-10-CM

## 2023-01-13 DIAGNOSIS — I25.118 CORONARY ARTERY DISEASE OF NATIVE ARTERY OF NATIVE HEART WITH STABLE ANGINA PECTORIS (HCC): ICD-10-CM

## 2023-01-13 DIAGNOSIS — E11.9 CONTROLLED TYPE 2 DIABETES MELLITUS WITHOUT COMPLICATION, WITHOUT LONG-TERM CURRENT USE OF INSULIN (HCC): Primary | ICD-10-CM

## 2023-01-13 DIAGNOSIS — E78.2 MIXED HYPERLIPIDEMIA: ICD-10-CM

## 2023-01-13 PROBLEM — Z99.11 ENCOUNTER FOR WEANING FROM VENTILATOR (HCC): Status: RESOLVED | Noted: 2022-10-17 | Resolved: 2023-01-13

## 2023-01-13 LAB
ALBUMIN SERPL-MCNC: 3.6 G/DL (ref 3.2–4.6)
ALBUMIN/GLOB SERPL: 1.1 (ref 0.4–1.6)
ALP SERPL-CCNC: 84 U/L (ref 50–136)
ALT SERPL-CCNC: 34 U/L (ref 12–65)
ANION GAP SERPL CALC-SCNC: 5 MMOL/L (ref 2–11)
AST SERPL-CCNC: 22 U/L (ref 15–37)
BASOPHILS # BLD: 0.1 K/UL (ref 0–0.2)
BASOPHILS NFR BLD: 1 % (ref 0–2)
BILIRUB SERPL-MCNC: 0.7 MG/DL (ref 0.2–1.1)
BUN SERPL-MCNC: 17 MG/DL (ref 8–23)
CALCIUM SERPL-MCNC: 10.2 MG/DL (ref 8.3–10.4)
CHLORIDE SERPL-SCNC: 106 MMOL/L (ref 101–110)
CO2 SERPL-SCNC: 27 MMOL/L (ref 21–32)
CREAT SERPL-MCNC: 1.1 MG/DL (ref 0.8–1.5)
DIFFERENTIAL METHOD BLD: ABNORMAL
EOSINOPHIL # BLD: 0.1 K/UL (ref 0–0.8)
EOSINOPHIL NFR BLD: 1 % (ref 0.5–7.8)
ERYTHROCYTE [DISTWIDTH] IN BLOOD BY AUTOMATED COUNT: 16 % (ref 11.9–14.6)
EST. AVERAGE GLUCOSE BLD GHB EST-MCNC: 146 MG/DL
GLOBULIN SER CALC-MCNC: 3.4 G/DL (ref 2.8–4.5)
GLUCOSE SERPL-MCNC: 137 MG/DL (ref 65–100)
HBA1C MFR BLD: 6.7 % (ref 4.8–5.6)
HCT VFR BLD AUTO: 39.6 % (ref 41.1–50.3)
HGB BLD-MCNC: 12.4 G/DL (ref 13.6–17.2)
IMM GRANULOCYTES # BLD AUTO: 0 K/UL (ref 0–0.5)
IMM GRANULOCYTES NFR BLD AUTO: 0 % (ref 0–5)
LYMPHOCYTES # BLD: 2.4 K/UL (ref 0.5–4.6)
LYMPHOCYTES NFR BLD: 26 % (ref 13–44)
MCH RBC QN AUTO: 27.4 PG (ref 26.1–32.9)
MCHC RBC AUTO-ENTMCNC: 31.3 G/DL (ref 31.4–35)
MCV RBC AUTO: 87.4 FL (ref 82–102)
MONOCYTES # BLD: 1.5 K/UL (ref 0.1–1.3)
MONOCYTES NFR BLD: 16 % (ref 4–12)
NEUTS SEG # BLD: 5.4 K/UL (ref 1.7–8.2)
NEUTS SEG NFR BLD: 56 % (ref 43–78)
NRBC # BLD: 0 K/UL (ref 0–0.2)
PLATELET # BLD AUTO: 253 K/UL (ref 150–450)
PMV BLD AUTO: 12.2 FL (ref 9.4–12.3)
POTASSIUM SERPL-SCNC: 5 MMOL/L (ref 3.5–5.1)
PROT SERPL-MCNC: 7 G/DL (ref 6.3–8.2)
RBC # BLD AUTO: 4.53 M/UL (ref 4.23–5.6)
SODIUM SERPL-SCNC: 138 MMOL/L (ref 133–143)
WBC # BLD AUTO: 9.5 K/UL (ref 4.3–11.1)

## 2023-01-13 ASSESSMENT — PATIENT HEALTH QUESTIONNAIRE - PHQ9
1. LITTLE INTEREST OR PLEASURE IN DOING THINGS: 0
SUM OF ALL RESPONSES TO PHQ QUESTIONS 1-9: 0
SUM OF ALL RESPONSES TO PHQ QUESTIONS 1-9: 0
2. FEELING DOWN, DEPRESSED OR HOPELESS: 0
SUM OF ALL RESPONSES TO PHQ QUESTIONS 1-9: 0
SUM OF ALL RESPONSES TO PHQ9 QUESTIONS 1 & 2: 0
SUM OF ALL RESPONSES TO PHQ QUESTIONS 1-9: 0

## 2023-01-13 ASSESSMENT — ENCOUNTER SYMPTOMS: BACK PAIN: 1

## 2023-01-20 ENCOUNTER — OFFICE VISIT (OUTPATIENT)
Dept: ORTHOPEDIC SURGERY | Age: 63
End: 2023-01-20

## 2023-01-20 DIAGNOSIS — M79.603 PAIN OF UPPER EXTREMITY, UNSPECIFIED LATERALITY: Primary | ICD-10-CM

## 2023-01-20 RX ORDER — MELOXICAM 15 MG/1
15 TABLET ORAL DAILY
Qty: 30 TABLET | Refills: 0 | Status: SHIPPED | OUTPATIENT
Start: 2023-01-20 | End: 2023-02-19

## 2023-01-20 NOTE — PROGRESS NOTES
The patient was prescribed and fitted with a Reparel arm sleeve for the right arm, size large. Patient read and signed documenting they understand and agree to [facility]'s current DME return policy.

## 2023-01-20 NOTE — LETTER
DME Patient Authorization Form    Name: Araceli Cabezas. : 1960  MRN: [id number]   Age: 60 y.o. Gender: male  Delivery Address: [facility]     Diagnosis: No diagnosis found. Requested DME:  Reparel Arm Sleeve** AARM ($35) X 1 - right        Clinical Notes:     **Indicates non-covered items by insurance. Payment expected on date of service. Electronically signed by  Provider: Wenceslao Arceo MD__Date: 2023                            [facility]/[address] Tax ID # [id number]        Durable Medical Equipment and/or Orthotics Patient Consent     I understand that my physician has prescribed this medical supply as part of my treatment plan as a matter of Medical Necessity.  I understand that I have a choice in where I receive my prescribed orthopedic supplies and/or services.  I authorize [facility] to furnish this service/product and to provide my insurance carrier with any information requested in order to process for payment.  I instruct my insurance carrier to pay [facility] directly for these services/products.  I understand that my insurance carrier may deny payment for this supply because it is a non-covered item, deemed not medically necessary or considered experimental.   I understand that any cost not covered by my insurance carrier will be solely my financial responsibility.  I have received the Supplier Standards and have reviewed them.  I have received the prescribed item and have been fully instructed on the proper use of the above services/products.    ______ (Patient Initials) I understand that all DME items are non-returnable after being dispensed. Items still in sealed packaging may be returned up to 14 days after purchasing.  [address] will replace items that are defective.    ______ (Patient Initials) I understand that [facility] will not file a claim with my insurance carrier for this service/product and I am waiving my right to file a claim on my own for this service/product with my insurance company as this item is NON-COVERED (Denoted by the **) by my Insurance company/policy. ______ (Patient Initials) I understand that I am responsible to bring my equipment to the hospital for any surgery. ______________________________________________  ________________________  Patient / Juan Carlos Londono            Thank you for considering [address] Your physician has prescribed specific medical equipment or devices for your home use. The following describes your rights and responsibilities as our customer. Right to Choose Providers: You have a choice regarding which company supplies your home medical equipment and devices, and to consult your physician in this decision. You may choose a medical supply store, a home medical equipment provider, or a specialist such as [facility]. [facility] will coordinate with your physician to provide the medical equipment or devices prescribed for your home use. Right to Service:  You have the right to considerate, respectful and nondiscriminatory care. You have the right to receive accurate and easily understood information about your health care. If you speak a foreign language, or don't understand the discussions, assistance will be provided to allow you to make informed health care decisions. You have the right to know your treatment options and to participate in decisions about your care, including the right to accept or refuse treatment. You have the right to expect a reasonable response to your requests for treatment or service. You have the right to talk in confidence with health care providers and to have your health care information protected. You have the right to receive an explanation of your bill. You have the right to complain about the service or product you receive. Patient Responsibilities:  Please provide complete and accurate information about your health insurance benefits and make arrangements for the timely payment of your bill. [facility] will, if possible, assume responsibility for billing your insurance (Medicare, Medicaid and commercial) for the prescribed equipment or devices. If your policy does not cover the prescribed product, or only covers a portion of the bill, you are responsible for any remaining balance. Return and Exchange Policy:  [facility] will honor published  Warranties for products. [facility] will accept returns or exchanges within 14 days from the date of receipt, providin) the product must be in new condition; 2) receipt as required; and 3) used disposable and hygiene products may only be returned due to a defective product. Note: Refunds will be issued in a timely manner, please allow 4-6 weeks for processing. Complaint Procedures and DME Consumer Protection Resources:  [facility] values you as a customer, and is committed to resolving patient concerns. This commitment includes understanding and documenting your concerns, conducting a review of internal procedures, and providing you with an explanation and resolution to your concerns. Should you have any questions about our services or billing process, please contact our office at (practice phone number). If we are unable to resolve the concern, you have the right to direct comments to the office of Consumer Protection, in the [address] or the [facility] office, without fear of repercussion. DMEPOS SUPPLIER STANDARDS    A supplier must be in compliance with all applicable Federal and [employer] and regulatory requirements.   A supplier must provide complete and accurate information on the DMEPOS supplier application. Any changes to this information must be reported to the [facility] within 30 days. An authorized individual (one whose signature is binding) must sign the application for billing privileges. A supplier must fill orders from its own inventory, or must contract with other companies for the purchase of items necessary to fill the order. A supplier may not contract with any entity that is currently excluded from the Medicare program, any [facility] program, or from any other Federal procurement or Nonprocurement programs. A supplier must advise beneficiaries that they may rent or purchase inexpensive or routinely purchased durable medical equipment, and of the purchase option for capped rental equipment. A supplier must notify beneficiaries of warranty coverage and honor all warranties under applicable State Law, and repair or replace free of charge Medicare covered items that are under warranty. A supplier must maintain a physical facility on an appropriate site. A supplier must permit CMS, or its agents to conduct on-site inspections to ascertain the supplier's compliance with these standards. The supplier location must be accessible to beneficiaries during reasonable business hours, and must maintain a visible sign and posted hours of operation. A supplier must maintain a primary business telephone listed under the name of the business in a [employer] or a toll free number available through directory assistance. The exclusive use of a beeper, answering machine or cell phone is prohibited. A supplier must have comprehensive liability insurance in the amount of at least $300,000 that covers both the supplier's place of business and all customers and employees of the supplier. If the supplier manufactures its own items, this insurance must also cover product liability and completed operations.   A supplier must agree not to initiate telephone contact with beneficiaries, with a few exceptions allowed. This standard prohibits suppliers from calling beneficiaries in order to solicit new business. A supplier is responsible for delivery and must instruct beneficiaries on use of Medicare covered items, and maintain proof of delivery. A supplier must answer questions, and respond to complaints of the beneficiaries, and maintain documentation of such contacts. A supplier must maintain and replace at no charge or repair directly, or through a service contract with another company, Medicare covered items it has rented to beneficiaries. A supplier must accept returns of substandard (less than full quality for the particular item) or unsuitable items (inappropriate for the beneficiary at the time it was fitted and rented or sold) from beneficiaries. A supplier must disclose these supplier standards to each beneficiary to whom it supplies a Medicare-covered item. A supplier must disclose to the government any person having ownership, financial, or control interest in the supplier. A supplier must not convey or reassign a supplier number; i.e., the supplier may not sell or allow another entity to use its Medicare billing number. A supplier must have a complaint resolution protocol established to address beneficiary complaints that relate to these standards. A record of these complaints must be maintained at the physical facility. Complaint records must include: the name, address, telephone number and health insurance claim number of the beneficiary, a summary of the complaint, and any action taken to resolve it. A supplier must agree to furnish CMS any information required by the Medicare statute and implementing regulations. A supplier of DMEPOS and other items and services must be accredited by a CMS-approved accreditation organization in order to receive and retain a supplier billing number.  The accreditation must indicate the specific products and services, for which the supplier is accredited in order for the supplier to receive payment for those specific products and services. A DMEPOS supplier must notify their accreditation organization when a new DMEPOS location is opened. The accreditation organization may accredit the new supplier location for three months after it is operational without requiring a new site visit. All DMEPOS supplier locations, whether owned or subcontracted, must meet the [employer] and be separately accredited in order to bill Medicare. An accredited supplier may be denied enrollment or their enrollment may be revoked, if CMS determines that they are not in compliance with the DMEPOS quality standards. A DMEPOS supplier must disclose upon enrollment all products and services, including the addition of new product lines for which they are seeking accreditation. If a new product line is added after enrollment, the DMEPOS supplier will be responsible for notifying the accrediting body of the new product so that the DMEPOS supplier can be re-surveyed and accredited for these new products. Must meet the surety bond requirements specified in 42 C. F.R. 424.57(c). Implementation date- May 4, 2009. A supplier must obtain oxygen from a state-licensed oxygen supplier. A supplier must maintain ordering and referring documentation consistent with provisions found in 42 C. F.R. 424.516(f). DMEPOS suppliers are prohibited from sharing a practice location with certain other Medicare providers and suppliers. DMEPOS suppliers must remain open to the public for a minimum of 30 hours per week with certain exceptions.

## 2023-01-20 NOTE — PROGRESS NOTES
Orthopaedic Hand Clinic Note    Name: Lai Brown YOB: 1960  Gender: male  MRN: [id number]      CC: Patient referred for evaluation of upper extremity pain    HPI: Lai Brown is a 60 y.o. male Right hand dominant with a chief complaint of right forearm pain, symptoms started 4 months ago when he felt sharp pain in the right forearm, this occurred right before he had heart surgery so he was unable to address this initially, he reports mass and pain on the proximal forearm on the right side, this impacts his ability to perform everyday life, he reports very occasional numbness into the thumb and index finger. ROS/Meds/PSH/PMH/FH/SH: I personally reviewed the patients standard intake form. Pertinents are discussed in the HPI    Physical Examination:  General: Awake and alert. HEENT: Normocephalic, atraumatic  CV/Pulm: Breathing even and unlabored  Skin: No obvious rashes noted. Lymphatic: No obvious evidence of lymphedema or lymphadenopathy    Musculoskeletal Exam:  Examination on the right upper extremity demonstrates cap refill < 5 seconds in all fingers, very minimal tenderness palpation of the lateral epicondyle, there is a palpable mass on the extensor supinator mass about 5 cm distal to the lateral epicondyle, this is slightly indurated and severely tender, there is pain with resisted wrist extension and chairlift test, this is all referred to the area of mass but not the lateral epicondyle, normal sensation in all fingers today, negative finger extension test for radial tunnel syndrome. Imaging / Electrodiagnostic Tests:     Ultrasound examination today demonstrates some hypoechoic area of the extensor supinator mass but no distinct mass is noted    Assessment:   1. Pain of upper extremity, unspecified laterality        Plan:   We discussed the diagnosis and different treatment options.  We discussed observation, therapy, antiinflammatory medications and other pertinent treatment modalities. After discussing in detail the patient elects to proceed with Mobic 15 daily for 4 weeks, compressive sleeve was provided, I offered to obtain an x-ray in order to obtain an MRI and rule out any masses, I find this to be very unlikely given that on ultrasound I could only visualize some hypoechoic area within the extensor supinator muscle mass so more likely not he had a musculotendinous junction injury of the extensor supinator mass, most of these tend to improve with time, the patient wanted to give it 2 more months to see if this improves. Patient voiced accordance and understanding of the information provided and the formulated plan. All questions were answered to the patient's satisfaction during the encounter.     Gregory Kraus MD  Orthopaedic Surgery  01/20/23  10:46 AM

## 2023-02-16 DIAGNOSIS — M79.603 PAIN OF UPPER EXTREMITY, UNSPECIFIED LATERALITY: ICD-10-CM

## 2023-02-16 RX ORDER — MELOXICAM 15 MG/1
TABLET ORAL
Qty: 30 TABLET | Refills: 0 | OUTPATIENT
Start: 2023-02-16

## 2023-03-03 ENCOUNTER — OFFICE VISIT (OUTPATIENT)
Dept: CARDIOLOGY CLINIC | Age: 63
End: 2023-03-03

## 2023-03-03 VITALS
SYSTOLIC BLOOD PRESSURE: 142 MMHG | HEIGHT: 66 IN | BODY MASS INDEX: 27.64 KG/M2 | DIASTOLIC BLOOD PRESSURE: 68 MMHG | HEART RATE: 68 BPM | WEIGHT: 172 LBS

## 2023-03-03 DIAGNOSIS — Z95.1 S/P CABG X 4: Primary | ICD-10-CM

## 2023-03-03 DIAGNOSIS — I10 ESSENTIAL HYPERTENSION: ICD-10-CM

## 2023-03-03 DIAGNOSIS — I73.9 CLAUDICATION (HCC): ICD-10-CM

## 2023-03-03 DIAGNOSIS — E11.9 CONTROLLED TYPE 2 DIABETES MELLITUS WITHOUT COMPLICATION, WITHOUT LONG-TERM CURRENT USE OF INSULIN (HCC): ICD-10-CM

## 2023-03-03 DIAGNOSIS — E78.2 MIXED HYPERLIPIDEMIA: ICD-10-CM

## 2023-03-03 ASSESSMENT — ENCOUNTER SYMPTOMS
NAIL CHANGES: 0
STRIDOR: 0
APHONIA: 0
ABDOMINAL PAIN: 0
COUGH: 0
EYE PAIN: 0

## 2023-03-03 NOTE — PROGRESS NOTES
[address], [address] [place] [address]  PHONE: [phone]    SUBJECTIVE:   Pablo Dozier is a 60 y.o. male 1960   seen for a follow up visit regarding the following:     Chief Complaint   Patient presents with    Coronary Artery Disease    Hypertension    3 Month Follow-Up    Established New Doctor         History of present illness: 60 y.o. male presented for follow-up 3/3/23 history of coronary artery disease status post coronary artery bypass grafting. The patient was previously followed by Dr. Kera Chávez prior to his halfway. Cardiac history:  10/2022 coronary artery bypass grafting LIMA to LAD SVG to diagonal SVG to indeterminate coronary artery SVG to PDA  9/2022  Left Ventricle: Normal left ventricular systolic function with a visually estimated EF of 50 - 55%. Left ventricle size is normal. Normal wall thickness. Normal wall motion. Normal diastolic function. Aortic Valve: Valve structure is normal. Mitral Valve: Mild regurgitation with a centrally directed jet. Assessment:   Coronary artery disease  Hyperlipidemia  atorvastatin - 80 MG   Diabetes  metFORMIN - 500 MG   Hypertension    Current Outpatient Medications   Medication Sig    meloxicam (MOBIC) 15 MG tablet Take 1 tablet by mouth daily    metFORMIN (GLUCOPHAGE) 500 MG tablet TAKE 1 TABLET BY MOUTH TWICE A DAY WITH MEALS    metoprolol succinate (TOPROL XL) 25 MG extended release tablet Take 1 tablet by mouth every morning    atorvastatin (LIPITOR) 80 MG tablet Take 1 tablet by mouth daily    aspirin 81 MG EC tablet Take 81 mg by mouth daily    acetaminophen (TYLENOL) 500 MG tablet Take 500 mg by mouth every 6 hours as needed for Pain    Magnesium 100 MG CAPS Take by mouth nightly    olmesartan (BENICAR) 20 MG tablet TAKE 1 TABLET BY MOUTH EVERY DAY    pregabalin (LYRICA) 150 MG capsule Take 150 mg by mouth 2 times daily.     vitamin B-12 (CYANOCOBALAMIN) 1000 MCG tablet Take 1,000 mcg by mouth daily    Cholecalciferol (VITAMIN D) 50 MCG (2000 UT) CAPS capsule Take by mouth nightly    omeprazole (PRILOSEC OTC) 20 MG tablet Take 20 mg by mouth daily As needed     No current facility-administered medications for this visit. Past Medical History, Past Surgical History, Family history, Social History, and Medications were all reviewed with the patient today and updated as necessary.        Allergies   Allergen Reactions    Other      Dial soap caused rash     Past Medical History:   Diagnosis Date    Abnormal finding on EKG 08/05/2020    low voltage and incomplete RBBB    Anxiety and depression     Bilateral foot pain     Chronic back pain     Chronic back pain     Coronary artery disease     scheduled for CABG 10/17/22    Diabetes ([facility]     type 2- metformin- A1C 6.7 (10/13/22)- denies hypo episodes    Former smoker, stopped smoking in distant past     quit 2004-  0.5 ppd x 26 years    H/O echocardiogram 09/30/2022    Echo LVEF 50-55%    HTN (hypertension)     Hx stress fracture     left foot    Impingement syndrome of right shoulder 2020    Dr. Ira Garcia    Insomnia     Lumbar spinal stenosis     Periodontal disease due to type 2 diabetes mellitus ([facility]     Dr. Felipe Jensen- dentist    Plantar fasciitis, bilateral     RLS (restless legs syndrome)      Past Surgical History:   Procedure Laterality Date    CARDIAC PROCEDURE N/A 9/30/2022    Left heart cath / coronary angiography performed by Stephanie Pathak MD at [address] [place] [address] N/A 9/30/2022    Fractional flow reserve (FFR) performed by Stephanie Pathak MD at [place] [address] N/A 10/17/2022    CABG CORONARY ARTERY BYPASS (CABG X4) LIMA / ENDOSCOPIC VEIN HARVEST - GREATER SAPHENOUS VEIN performed by Shayna Vasques MD at [facility] MAIN OR    TRANSESOPHAGEAL ECHOCARDIOGRAM N/A 10/17/2022    TRANSESOPHAGEAL ECHOCARDIOGRAM performed by Shayna Vasques MD at [facility] MAIN OR     Family History   Problem Relation Age of Onset    Stroke Mother     COPD Father     Coronary Art Dis Father     Diabetes Sister       Social History     Tobacco Use    Smoking status: Former     Packs/day: 0.50     Years: 26.00     Pack years: 13.00     Types: Cigarettes     Start date: 1978     Quit date: 2004     Years since quittin.1    Smokeless tobacco: Never   Substance Use Topics    Alcohol use: Not on file       ROS:    Review of Systems   Constitutional: Negative for fever. HENT:  Negative for stridor. Eyes:  Negative for pain. Cardiovascular:  Negative for chest pain. Respiratory:  Negative for cough. Endocrine: Negative for cold intolerance. Skin:  Negative for nail changes. Musculoskeletal:  Negative for arthritis. Gastrointestinal:  Negative for abdominal pain. Genitourinary:  Negative for dysuria. Neurological:  Negative for aphonia. Psychiatric/Behavioral:  Negative for altered mental status. Allergic/Immunologic: Negative for hives. PHYSICAL EXAM:    BP (!) 142/68   Pulse 68   Ht 5' 6\" (1.676 m)   Wt 172 lb (78 kg)   BMI 27.76 kg/m²        Wt Readings from Last 3 Encounters:   23 172 lb (78 kg)   23 172 lb 3.2 oz (78.1 kg)   22 167 lb 3.2 oz (75.8 kg)     BP Readings from Last 3 Encounters:   23 (!) 142/68   23 118/70   22 110/62         Physical Exam  Vitals reviewed. HENT:      Head: Normocephalic. Right Ear: External ear normal.      Left Ear: External ear normal.      Nose: Nose normal.   Eyes:      General: No scleral icterus. Pulmonary:      Effort: Pulmonary effort is normal.   Abdominal:      General: There is no distension. Musculoskeletal:      Cervical back: Neck supple. Skin:     General: Skin is warm. Neurological:      Mental Status: He is alert. Mental status is at baseline. Medical problems and test results were reviewed with the patient today.            No results found for this or any previous visit (from the past 672 hour(s)). Lab Results   Component Value Date/Time    CHOL 99 12/30/2022 03:14 PM    HDL 29 12/30/2022 03:14 PM    VLDL 19 01/07/2022 10:45 AM       No results found for any visits on 03/03/23. Sunny Oliver was seen today for coronary artery disease, hypertension, 3 month follow-up and established new doctor. Diagnoses and all orders for this visit:    S/P CABG x 4    Mixed hyperlipidemia    Essential hypertension    Controlled type 2 diabetes mellitus without complication, without long-term current use of insulin ([facility]    Return in about 6 months (around 9/3/2023).        Adrianna Means MD  3/3/2023  1:14 PM

## 2023-03-22 RX ORDER — OLMESARTAN MEDOXOMIL 20 MG/1
20 TABLET ORAL DAILY
Qty: 90 TABLET | Refills: 2 | Status: SHIPPED | OUTPATIENT
Start: 2023-03-22

## 2023-03-24 ENCOUNTER — OFFICE VISIT (OUTPATIENT)
Dept: ORTHOPEDIC SURGERY | Age: 63
End: 2023-03-24
Payer: COMMERCIAL

## 2023-03-24 DIAGNOSIS — M79.603 PAIN OF UPPER EXTREMITY, UNSPECIFIED LATERALITY: Primary | ICD-10-CM

## 2023-03-24 PROCEDURE — 3017F COLORECTAL CA SCREEN DOC REV: CPT | Performed by: ORTHOPAEDIC SURGERY

## 2023-03-24 PROCEDURE — 1036F TOBACCO NON-USER: CPT | Performed by: ORTHOPAEDIC SURGERY

## 2023-03-24 PROCEDURE — G8428 CUR MEDS NOT DOCUMENT: HCPCS | Performed by: ORTHOPAEDIC SURGERY

## 2023-03-24 PROCEDURE — 99214 OFFICE O/P EST MOD 30 MIN: CPT | Performed by: ORTHOPAEDIC SURGERY

## 2023-03-24 PROCEDURE — G8484 FLU IMMUNIZE NO ADMIN: HCPCS | Performed by: ORTHOPAEDIC SURGERY

## 2023-03-24 PROCEDURE — G8419 CALC BMI OUT NRM PARAM NOF/U: HCPCS | Performed by: ORTHOPAEDIC SURGERY

## 2023-03-24 RX ORDER — MELOXICAM 15 MG/1
15 TABLET ORAL DAILY
Qty: 42 TABLET | Refills: 0 | Status: SHIPPED | OUTPATIENT
Start: 2023-03-24 | End: 2023-05-05

## 2023-03-24 NOTE — PROGRESS NOTES
Orthopaedic Hand Clinic Note    Name: Rey Orona. Age: 60 y.o. YOB: 1960  Gender: male  MRN: [id number]      Follow up visit:   1. Pain of upper extremity, unspecified laterality        HPI: Rey Jefferson is a 60 y.o. male who is following up for right forearm pain, on the last visit I provided home exercise program and anti-inflammatories, patient reports he is about 90% better, he still has some pain with certain activities. ROS/Meds/PSH/PMH/FH/SH: I personally reviewed the patients standard intake form. Pertinents are discussed in the HPI    Physical Examination:  General: Awake and alert. HEENT: Normocephalic, atraumatic  CV/Pulm: Breathing even and unlabored  Skin: No obvious rashes noted. Lymphatic: No obvious evidence of lymphedema or lymphadenopathy    Musculoskeletal Examination:  Examination on the right upper extremity demonstrates cap refill < 5 seconds in all fingers, mild tender palpation of the muscular tendon junction of the common extensor mass distal to the lateral epicondyle, there is very minimal pain with resisted wrist extension. Imaging / Electrodiagnostic Tests:     none    Assessment:   1. Pain of upper extremity, unspecified laterality        Plan:   We discussed the diagnosis and different treatment options. We discussed observation, therapy, antiinflammatory medications and other pertinent treatment modalities. After discussing in detail the patient elects to proceed with continued home exercise program, Mobic 15 daily for 6 weeks, he will return on an as-needed basis. Patient voiced accordance and understanding of the information provided and the formulated plan. All questions were answered to the patient's satisfaction during the encounter.     Mallory Palencia MD  Orthopaedic Surgery  03/24/23  10:53 AM

## 2023-04-20 RX ORDER — MELOXICAM 15 MG/1
TABLET ORAL
Qty: 30 TABLET | Refills: 1 | OUTPATIENT
Start: 2023-04-20

## 2023-05-13 ENCOUNTER — PATIENT MESSAGE (OUTPATIENT)
Dept: NEUROSURGERY | Age: 63
End: 2023-05-13

## 2023-05-15 NOTE — TELEPHONE ENCOUNTER
From: Jenaro Blank. To: Dr. Suzi Lamar: 2023 9:39 AM EDT  Subject: Legs    I had something happen with my legs 1 night. I was sitting in my chair & got up to go to bed . When i stood up to walk, My hips would not let my legs move easily. when i forced my legs to move pain shoot all the way down to my feet & i mean alot of pain. I made to my couch layed on my back bent my legs to my chest & squeezed real hard . Then let go. I had too do this for about 30 minutes before i could walk again. Any idea what happened? And will it happen again.

## 2023-05-16 SDOH — ECONOMIC STABILITY: TRANSPORTATION INSECURITY
IN THE PAST 12 MONTHS, HAS LACK OF TRANSPORTATION KEPT YOU FROM MEETINGS, WORK, OR FROM GETTING THINGS NEEDED FOR DAILY LIVING?: NO

## 2023-05-16 SDOH — ECONOMIC STABILITY: FOOD INSECURITY: WITHIN THE PAST 12 MONTHS, THE FOOD YOU BOUGHT JUST DIDN'T LAST AND YOU DIDN'T HAVE MONEY TO GET MORE.: NEVER TRUE

## 2023-05-16 SDOH — ECONOMIC STABILITY: FOOD INSECURITY: WITHIN THE PAST 12 MONTHS, YOU WORRIED THAT YOUR FOOD WOULD RUN OUT BEFORE YOU GOT MONEY TO BUY MORE.: NEVER TRUE

## 2023-05-16 SDOH — ECONOMIC STABILITY: HOUSING INSECURITY
IN THE LAST 12 MONTHS, WAS THERE A TIME WHEN YOU DID NOT HAVE A STEADY PLACE TO SLEEP OR SLEPT IN A SHELTER (INCLUDING NOW)?: NO

## 2023-05-16 SDOH — ECONOMIC STABILITY: INCOME INSECURITY: HOW HARD IS IT FOR YOU TO PAY FOR THE VERY BASICS LIKE FOOD, HOUSING, MEDICAL CARE, AND HEATING?: NOT HARD AT ALL

## 2023-05-19 ENCOUNTER — OFFICE VISIT (OUTPATIENT)
Dept: INTERNAL MEDICINE CLINIC | Facility: CLINIC | Age: 63
End: 2023-05-19
Payer: COMMERCIAL

## 2023-05-19 VITALS
WEIGHT: 171 LBS | SYSTOLIC BLOOD PRESSURE: 124 MMHG | BODY MASS INDEX: 27.48 KG/M2 | HEIGHT: 66 IN | DIASTOLIC BLOOD PRESSURE: 70 MMHG

## 2023-05-19 DIAGNOSIS — E11.9 CONTROLLED TYPE 2 DIABETES MELLITUS WITHOUT COMPLICATION, WITHOUT LONG-TERM CURRENT USE OF INSULIN (HCC): ICD-10-CM

## 2023-05-19 DIAGNOSIS — E78.2 MIXED HYPERLIPIDEMIA: ICD-10-CM

## 2023-05-19 DIAGNOSIS — E11.9 CONTROLLED TYPE 2 DIABETES MELLITUS WITHOUT COMPLICATION, WITHOUT LONG-TERM CURRENT USE OF INSULIN (HCC): Primary | ICD-10-CM

## 2023-05-19 DIAGNOSIS — M48.062 SPINAL STENOSIS OF LUMBAR REGION WITH NEUROGENIC CLAUDICATION: ICD-10-CM

## 2023-05-19 DIAGNOSIS — I10 ESSENTIAL HYPERTENSION: ICD-10-CM

## 2023-05-19 DIAGNOSIS — K21.9 GASTROESOPHAGEAL REFLUX DISEASE WITHOUT ESOPHAGITIS: ICD-10-CM

## 2023-05-19 DIAGNOSIS — M79.671 BILATERAL FOOT PAIN: ICD-10-CM

## 2023-05-19 DIAGNOSIS — M79.672 BILATERAL FOOT PAIN: ICD-10-CM

## 2023-05-19 DIAGNOSIS — I25.10 CAD, MULTIPLE VESSEL: ICD-10-CM

## 2023-05-19 PROBLEM — M54.50 LOW BACK PAIN POTENTIALLY ASSOCIATED WITH SPINAL STENOSIS: Status: RESOLVED | Noted: 2021-09-03 | Resolved: 2023-05-19

## 2023-05-19 LAB
ALBUMIN SERPL-MCNC: 4.2 G/DL (ref 3.2–4.6)
ALBUMIN/GLOB SERPL: 1.4 (ref 0.4–1.6)
ALP SERPL-CCNC: 128 U/L (ref 50–136)
ALT SERPL-CCNC: 80 U/L (ref 12–65)
ANION GAP SERPL CALC-SCNC: ABNORMAL MMOL/L (ref 2–11)
AST SERPL-CCNC: 46 U/L (ref 15–37)
BASOPHILS # BLD: 0.1 K/UL (ref 0–0.2)
BASOPHILS NFR BLD: 1 % (ref 0–2)
BILIRUB SERPL-MCNC: 0.7 MG/DL (ref 0.2–1.1)
BUN SERPL-MCNC: 16 MG/DL (ref 8–23)
CALCIUM SERPL-MCNC: 9.3 MG/DL (ref 8.3–10.4)
CHLORIDE SERPL-SCNC: 109 MMOL/L (ref 101–110)
CHOLEST SERPL-MCNC: 107 MG/DL
CO2 SERPL-SCNC: 29 MMOL/L (ref 21–32)
CREAT SERPL-MCNC: 1.1 MG/DL (ref 0.8–1.5)
CREAT UR-MCNC: 55 MG/DL
DIFFERENTIAL METHOD BLD: ABNORMAL
EOSINOPHIL # BLD: 0.1 K/UL (ref 0–0.8)
EOSINOPHIL NFR BLD: 2 % (ref 0.5–7.8)
ERYTHROCYTE [DISTWIDTH] IN BLOOD BY AUTOMATED COUNT: 16.7 % (ref 11.9–14.6)
EST. AVERAGE GLUCOSE BLD GHB EST-MCNC: 154 MG/DL
GLOBULIN SER CALC-MCNC: 2.9 G/DL (ref 2.8–4.5)
GLUCOSE SERPL-MCNC: 146 MG/DL (ref 65–100)
HBA1C MFR BLD: 7 % (ref 4.8–5.6)
HCT VFR BLD AUTO: 41.7 % (ref 41.1–50.3)
HDLC SERPL-MCNC: 33 MG/DL (ref 40–60)
HDLC SERPL: 3.2
HGB BLD-MCNC: 13.4 G/DL (ref 13.6–17.2)
IMM GRANULOCYTES # BLD AUTO: 0 K/UL (ref 0–0.5)
IMM GRANULOCYTES NFR BLD AUTO: 0 % (ref 0–5)
LDLC SERPL CALC-MCNC: 50.6 MG/DL
LYMPHOCYTES # BLD: 2.1 K/UL (ref 0.5–4.6)
LYMPHOCYTES NFR BLD: 31 % (ref 13–44)
MCH RBC QN AUTO: 29.1 PG (ref 26.1–32.9)
MCHC RBC AUTO-ENTMCNC: 32.1 G/DL (ref 31.4–35)
MCV RBC AUTO: 90.5 FL (ref 82–102)
MICROALBUMIN UR-MCNC: 2.64 MG/DL
MICROALBUMIN/CREAT UR-RTO: 48 MG/G (ref 0–30)
MONOCYTES # BLD: 0.8 K/UL (ref 0.1–1.3)
MONOCYTES NFR BLD: 12 % (ref 4–12)
NEUTS SEG # BLD: 3.6 K/UL (ref 1.7–8.2)
NEUTS SEG NFR BLD: 54 % (ref 43–78)
NRBC # BLD: 0 K/UL (ref 0–0.2)
PLATELET # BLD AUTO: 169 K/UL (ref 150–450)
PMV BLD AUTO: 13.2 FL (ref 9.4–12.3)
POTASSIUM SERPL-SCNC: 5.4 MMOL/L (ref 3.5–5.1)
PROT SERPL-MCNC: 7.1 G/DL (ref 6.3–8.2)
RBC # BLD AUTO: 4.61 M/UL (ref 4.23–5.6)
SODIUM SERPL-SCNC: 134 MMOL/L (ref 133–143)
TRIGL SERPL-MCNC: 117 MG/DL (ref 35–150)
VLDLC SERPL CALC-MCNC: 23.4 MG/DL (ref 6–23)
WBC # BLD AUTO: 6.7 K/UL (ref 4.3–11.1)

## 2023-05-19 PROCEDURE — 1036F TOBACCO NON-USER: CPT | Performed by: INTERNAL MEDICINE

## 2023-05-19 PROCEDURE — 2022F DILAT RTA XM EVC RTNOPTHY: CPT | Performed by: INTERNAL MEDICINE

## 2023-05-19 PROCEDURE — 3078F DIAST BP <80 MM HG: CPT | Performed by: INTERNAL MEDICINE

## 2023-05-19 PROCEDURE — 3074F SYST BP LT 130 MM HG: CPT | Performed by: INTERNAL MEDICINE

## 2023-05-19 PROCEDURE — G8419 CALC BMI OUT NRM PARAM NOF/U: HCPCS | Performed by: INTERNAL MEDICINE

## 2023-05-19 PROCEDURE — 99214 OFFICE O/P EST MOD 30 MIN: CPT | Performed by: INTERNAL MEDICINE

## 2023-05-19 PROCEDURE — 3044F HG A1C LEVEL LT 7.0%: CPT | Performed by: INTERNAL MEDICINE

## 2023-05-19 PROCEDURE — G8427 DOCREV CUR MEDS BY ELIG CLIN: HCPCS | Performed by: INTERNAL MEDICINE

## 2023-05-19 PROCEDURE — 3017F COLORECTAL CA SCREEN DOC REV: CPT | Performed by: INTERNAL MEDICINE

## 2023-05-19 RX ORDER — MELOXICAM 15 MG/1
15 TABLET ORAL DAILY
Qty: 30 TABLET | Refills: 5 | Status: SHIPPED | OUTPATIENT
Start: 2023-05-19 | End: 2023-06-18

## 2023-05-19 ASSESSMENT — ENCOUNTER SYMPTOMS
BACK PAIN: 1
SHORTNESS OF BREATH: 0

## 2023-05-19 ASSESSMENT — PATIENT HEALTH QUESTIONNAIRE - PHQ9
2. FEELING DOWN, DEPRESSED OR HOPELESS: 0
SUM OF ALL RESPONSES TO PHQ QUESTIONS 1-9: 0
1. LITTLE INTEREST OR PLEASURE IN DOING THINGS: 0
SUM OF ALL RESPONSES TO PHQ9 QUESTIONS 1 & 2: 0

## 2023-05-19 NOTE — PROGRESS NOTES
HPI: Tyler Kent. (: 1960)    Going to dentist this am    Need to update labs     Has been having more issues with pain in his back and pain going down both legs  Had one episode where he felt he could not walk due to weakness and pain    Is taking Mobic daily but also having more arthritis pain in his hands    Has been trying to schedule his colonoscopy     Problem List:  Patient Active Problem List   Diagnosis    Mixed hyperlipidemia    Other bursal cyst, right hand    Restless leg syndrome    Controlled type 2 diabetes mellitus without complication, without long-term current use of insulin ([facility])    Weakness of both legs    Bilateral foot pain    Gastroesophageal reflux disease without esophagitis    Essential hypertension    High foot arch    Chronic SI joint pain    Agatston coronary artery calcium score greater than 400    Abnormal stress test    CAD, multiple vessel    Dyspnea    Coronary artery disease of native artery of native heart with stable angina pectoris ([address]    S/P CABG x 4    Other specified diabetes mellitus with diabetic polyneuropathy, without long-term current use of insulin ([address]    Spinal stenosis of lumbar region with neurogenic claudication       History:  Past Medical History:   Diagnosis Date    Abnormal finding on EKG 2020    low voltage and incomplete RBBB    Anxiety and depression     Bilateral foot pain     Chronic back pain     Chronic back pain     Coronary artery disease     scheduled for CABG 10/17/22    Diabetes ([address]     type 2- metformin- A1C 6.7 (10/13/22)- denies hypo episodes    Former smoker, stopped smoking in distant past     quit -  0.5 ppd x 26 years    H/O echocardiogram 2022    Echo LVEF 50-55%    HTN (hypertension)     Hx stress fracture     left foot    Impingement syndrome of right shoulder     Dr. Davin James Insomnia     Lumbar spinal stenosis     Periodontal disease due to type 2 diabetes

## 2023-09-22 ENCOUNTER — OFFICE VISIT (OUTPATIENT)
Age: 63
End: 2023-09-22

## 2023-09-22 VITALS
WEIGHT: 173 LBS | DIASTOLIC BLOOD PRESSURE: 70 MMHG | BODY MASS INDEX: 27.8 KG/M2 | SYSTOLIC BLOOD PRESSURE: 138 MMHG | HEIGHT: 66 IN | HEART RATE: 58 BPM | OXYGEN SATURATION: 99 %

## 2023-09-22 DIAGNOSIS — E78.5 HYPERLIPIDEMIA LDL GOAL <70: ICD-10-CM

## 2023-09-22 DIAGNOSIS — E08.65 DIABETES MELLITUS DUE TO UNDERLYING CONDITION WITH HYPERGLYCEMIA, UNSPECIFIED WHETHER LONG TERM INSULIN USE (HCC): ICD-10-CM

## 2023-09-22 DIAGNOSIS — Z95.1 S/P CABG X 4: Primary | ICD-10-CM

## 2023-09-22 RX ORDER — METOPROLOL SUCCINATE 25 MG/1
25 TABLET, EXTENDED RELEASE ORAL EVERY MORNING
Qty: 90 TABLET | Refills: 3 | Status: SHIPPED | OUTPATIENT
Start: 2023-09-22

## 2023-09-22 RX ORDER — SEMAGLUTIDE 0.68 MG/ML
INJECTION, SOLUTION SUBCUTANEOUS
Qty: 3 ML | Refills: 2 | Status: SHIPPED | OUTPATIENT
Start: 2023-09-22 | End: 2023-11-20

## 2023-09-22 RX ORDER — SEMAGLUTIDE 1.34 MG/ML
1 INJECTION, SOLUTION SUBCUTANEOUS
Qty: 3 ML | Refills: 5 | Status: SHIPPED | OUTPATIENT
Start: 2023-11-22 | End: 2023-12-22

## 2023-09-22 ASSESSMENT — ENCOUNTER SYMPTOMS
EYE PAIN: 0
COUGH: 0
APHONIA: 0
NAIL CHANGES: 0
ABDOMINAL PAIN: 0
STRIDOR: 0

## 2023-09-22 NOTE — PROGRESS NOTES
[address] [id number] [address] [place] [address], [facility], [address]  PHONE: [phone]    SUBJECTIVE:   Kristy Gastelum is a 58 y.o. male 1960   seen for a follow up visit regarding the following:     Chief Complaint   Patient presents with    6 Month Follow-Up    Coronary Artery Disease    Hypertension         History of present illness: 58 y.o. male presented for follow-up 9/22/23 A1c 7     Interval Hx    history of coronary artery disease status post coronary artery bypass grafting. The patient was previously followed by Dr. Enoc Mejia prior to his assisted. Cardiac history:  10/2022 coronary artery bypass grafting LIMA to LAD SVG to diagonal SVG to indeterminate coronary artery SVG to PDA  9/2022  Left Ventricle: Normal left ventricular systolic function with a visually estimated EF of 50 - 55%. Left ventricle size is normal. Normal wall thickness. Normal wall motion. Normal diastolic function. Aortic Valve: Valve structure is normal. Mitral Valve: Mild regurgitation with a centrally directed jet. Assessment:   Coronary artery disease  Hyperlipidemia  atorvastatin - 80 MG   Diabetes  Not controlled last A1c 7.0  metFORMIN - 500 MG   Start GL-P therapy   Hypertension    Current Outpatient Medications   Medication Sig    metoprolol succinate (TOPROL XL) 25 MG extended release tablet Take 1 tablet by mouth every morning    Semaglutide,0.25 or 0.5MG/DOS, (OZEMPIC, 0.25 OR 0.5 MG/DOSE,) 2 MG/3ML SOPN Inject 0.25 Doses into the skin every 7 days for 30 days, THEN 0.5 Doses every 7 days.     [START ON 11/22/2023] Semaglutide, 1 MG/DOSE, (OZEMPIC, 1 MG/DOSE,) 4 MG/3ML SOPN Inject 1 mg into the skin every 7 days    meloxicam (MOBIC) 15 MG tablet Take 1 tablet by mouth daily    metFORMIN (GLUCOPHAGE) 500 MG tablet Take 1 tablet by mouth 2 times daily (with meals)    olmesartan (BENICAR) 20 MG tablet Take 1 tablet by mouth daily    atorvastatin (LIPITOR) 80 MG tablet Take 1 tablet by

## 2023-09-23 NOTE — TELEPHONE ENCOUNTER
Requested Prescriptions     Pending Prescriptions Disp Refills    olmesartan (BENICAR) 20 MG tablet [Pharmacy Med Name: OLMESARTAN MEDOXOMIL 20 MG TAB] 90 tablet 1     Sig: TAKE 1 TABLET BY MOUTH EVERY DAY
Statement Selected

## 2023-10-23 RX ORDER — ATORVASTATIN CALCIUM 40 MG/1
40 TABLET, FILM COATED ORAL DAILY
Qty: 90 TABLET | Refills: 1 | Status: SHIPPED | OUTPATIENT
Start: 2023-10-23

## 2023-11-03 NOTE — PROGRESS NOTES
HPI: Samanta Deras. (: 1960)     hurt his right forearm and hand at work and did not get a referral to [facility] but still having pain in his right elbow, forearm and hand    Had a laceration on a piece of metal at work on the left hand and had sutures and got a Tetanus in early Oct    Has anemia and heme positive stool but never got the GI eval as he had to have emergent CABG so now need to recheck anemia  And get f/u with GI  Wanted to restart Naproxen for pain in his back as Tylenol and Lyrica are not helping but concerned about GI source of anemia    Will update labs  Problem List:  Patient Active Problem List   Diagnosis    Mixed hyperlipidemia    Other bursal cyst, right hand    Low back pain potentially associated with spinal stenosis    Restless leg syndrome    Controlled type 2 diabetes mellitus without complication, without long-term current use of insulin ([facility])    Weakness of both legs    Bilateral foot pain    Gastroesophageal reflux disease without esophagitis    Essential hypertension    High foot arch    Chronic SI joint pain    Agatston coronary artery calcium score greater than 400    Abnormal stress test    CAD, multiple vessel    Dyspnea    Coronary artery disease of native artery of native heart with stable angina pectoris ([address]    S/P CABG x 4    Other specified diabetes mellitus with diabetic polyneuropathy, without long-term current use of insulin ([address]       History:  Past Medical History:   Diagnosis Date    Abnormal finding on EKG 2020    low voltage and incomplete RBBB    Anxiety and depression     Bilateral foot pain     Chronic back pain     Chronic back pain     Coronary artery disease     scheduled for CABG 10/17/22    Diabetes ([address]     type 2- metformin- A1C 6.7 (10/13/22)- denies hypo episodes    Former smoker, stopped smoking in distant past     quit -  0.5 ppd x 26 years    H/O echocardiogram 2022    Echo LVEF 50-55%    HTN (hypertension)     Hx stress fracture     left foot    Impingement syndrome of right shoulder 2020    Dr. Berto Hayden Insomnia     Lumbar spinal stenosis     Period[facility] disease due to type 2 diabetes mellitus ([facility]     Dr. Ariel Finn Plantar fasciitis, bilateral     RLS (restless legs syndrome)        Allergies: Allergies   Allergen Reactions    Other      Dial soap caused rash       Current Medications:  Current Outpatient Medications   Medication Sig Dispense Refill    metFORMIN (GLUCOPHAGE) 500 MG tablet TAKE 1 TABLET BY MOUTH TWICE A DAY WITH MEALS 180 tablet 1    metoprolol succinate (TOPROL XL) 25 MG extended release tablet Take 1 tablet by mouth every morning 90 tablet 3    atorvastatin (LIPITOR) 80 MG tablet Take 1 tablet by mouth daily 90 tablet 3    aspirin 81 MG EC tablet Take 81 mg by mouth daily      acetaminophen (TYLENOL) 500 MG tablet Take 500 mg by mouth every 6 hours as needed for Pain      Magnesium 100 MG CAPS Take by mouth nightly      olmesartan (BENICAR) 20 MG tablet TAKE 1 TABLET BY MOUTH EVERY DAY 90 tablet 1    pregabalin (LYRICA) 150 MG capsule Take 150 mg by mouth 2 times daily.  vitamin B-12 (CYANOCOBALAMIN) 1000 MCG tablet Take 1,000 mcg by mouth daily      Cholecalciferol (VITAMIN D) 50 MCG (2000 UT) CAPS capsule Take by mouth nightly      omeprazole (PRILOSEC OTC) 20 MG tablet Take 20 mg by mouth daily As needed       No current facility-administered medications for this visit. Review of Systems:  Review of Systems   Constitutional:  Positive for unexpected weight change. Weight gain   Gastrointestinal:         Ervin Shows   Musculoskeletal:  Positive for arthralgias and back pain. All other systems reviewed and are negative. Vitals:  Ht 5' 6\" (1.676 m)   Wt 172 lb 3.2 oz (78.1 kg)   BMI 27.79 kg/m²     Physical Exam:  Physical Exam  Vitals reviewed. Constitutional:       Appearance: Normal appearance.    HENT:      Head: Normocephalic and atraumatic. Eyes:      Extraocular Movements: Extraocular movements intact. Pupils: Pupils are equal, round, and reactive to light. Cardiovascular:      Rate and Rhythm: Normal rate and regular rhythm. Heart sounds: Normal heart sounds. Pulmonary:      Effort: Pulmonary effort is normal.      Breath sounds: Normal breath sounds. Musculoskeletal:         General: Tenderness present. Normal range of motion. Cervical back: Normal range of motion and neck supple. Comments: Right elbow into right forearm tenderness   Skin:     General: Skin is warm and dry. Neurological:      General: No focal deficit present. Mental Status: He is alert and oriented to person, place, and time. Psychiatric:         Mood and Affect: Mood normal.         Behavior: Behavior normal.         Thought Content: Thought content normal.         Judgment: Judgment normal.        Assessment/Plan:   Ale Ramachandran was seen today for follow-up. Diagnoses and all orders for this visit:    Controlled type 2 diabetes mellitus without complication, without long-term current use of insulin (HCC)  -     CBC with Auto Differential; Future  -     Comprehensive Metabolic Panel; Future  -     Hemoglobin A1C; Future    Other specified diabetes mellitus with diabetic polyneuropathy, without long-term current use of insulin (HCC)    Coronary artery disease of native artery of native heart with stable angina pectoris ([address]  Back at work  Mixed hyperlipidemia  On statin therapy  Essential hypertension  -     CBC with Auto Differential; Future  -     Comprehensive Metabolic Panel;  Future    Gastroesophageal reflux disease without esophagitis    Anemia, unspecified type  -     CBC with Auto Differential; Future  -     [facility]    Low back pain potentially associated with spinal stenosis  Continue meds but hold on Naproxen  Right hand pain  -     [facility] [address]    Right forearm pain  -     [address]    Heme positive stool  -     [facility]      Need to reeval etiology of anemia with GI     Need to update labs    Need to refer to Ortho    Current medications are therapeutic at this time; continue as prescribed.         Crystal Barrett MD 1 person assist

## 2023-11-15 ASSESSMENT — PATIENT HEALTH QUESTIONNAIRE - PHQ9
SUM OF ALL RESPONSES TO PHQ9 QUESTIONS 1 & 2: 0
2. FEELING DOWN, DEPRESSED OR HOPELESS: 0
1. LITTLE INTEREST OR PLEASURE IN DOING THINGS: NOT AT ALL
SUM OF ALL RESPONSES TO PHQ9 QUESTIONS 1 & 2: 0
SUM OF ALL RESPONSES TO PHQ QUESTIONS 1-9: 0
1. LITTLE INTEREST OR PLEASURE IN DOING THINGS: 0
SUM OF ALL RESPONSES TO PHQ QUESTIONS 1-9: 0
2. FEELING DOWN, DEPRESSED OR HOPELESS: NOT AT ALL
SUM OF ALL RESPONSES TO PHQ QUESTIONS 1-9: 0
SUM OF ALL RESPONSES TO PHQ QUESTIONS 1-9: 0

## 2023-11-17 ENCOUNTER — OFFICE VISIT (OUTPATIENT)
Dept: INTERNAL MEDICINE CLINIC | Facility: CLINIC | Age: 63
End: 2023-11-17
Payer: COMMERCIAL

## 2023-11-17 ENCOUNTER — HOSPITAL ENCOUNTER (OUTPATIENT)
Dept: GENERAL RADIOLOGY | Age: 63
Discharge: HOME | End: 2023-11-17
Payer: COMMERCIAL

## 2023-11-17 VITALS
WEIGHT: 168 LBS | BODY MASS INDEX: 27 KG/M2 | SYSTOLIC BLOOD PRESSURE: 128 MMHG | HEIGHT: 66 IN | DIASTOLIC BLOOD PRESSURE: 70 MMHG

## 2023-11-17 DIAGNOSIS — E78.2 MIXED HYPERLIPIDEMIA: ICD-10-CM

## 2023-11-17 DIAGNOSIS — M25.551 RIGHT HIP PAIN: ICD-10-CM

## 2023-11-17 DIAGNOSIS — E11.9 CONTROLLED TYPE 2 DIABETES MELLITUS WITHOUT COMPLICATION, WITHOUT LONG-TERM CURRENT USE OF INSULIN (HCC): Primary | ICD-10-CM

## 2023-11-17 DIAGNOSIS — M48.062 SPINAL STENOSIS OF LUMBAR REGION WITH NEUROGENIC CLAUDICATION: ICD-10-CM

## 2023-11-17 DIAGNOSIS — Z12.5 PROSTATE CANCER SCREENING: ICD-10-CM

## 2023-11-17 DIAGNOSIS — I10 ESSENTIAL HYPERTENSION: ICD-10-CM

## 2023-11-17 DIAGNOSIS — I25.10 CAD, MULTIPLE VESSEL: ICD-10-CM

## 2023-11-17 DIAGNOSIS — M15.9 PRIMARY OSTEOARTHRITIS INVOLVING MULTIPLE JOINTS: ICD-10-CM

## 2023-11-17 DIAGNOSIS — E11.9 CONTROLLED TYPE 2 DIABETES MELLITUS WITHOUT COMPLICATION, WITHOUT LONG-TERM CURRENT USE OF INSULIN (HCC): ICD-10-CM

## 2023-11-17 PROBLEM — M15.0 PRIMARY OSTEOARTHRITIS INVOLVING MULTIPLE JOINTS: Status: ACTIVE | Noted: 2023-11-17

## 2023-11-17 LAB
ALBUMIN SERPL-MCNC: 4.6 G/DL (ref 3.2–4.6)
ALBUMIN/GLOB SERPL: 1.4 (ref 0.4–1.6)
ALP SERPL-CCNC: 122 U/L (ref 50–136)
ALT SERPL-CCNC: 71 U/L (ref 12–65)
ANION GAP SERPL CALC-SCNC: 7 MMOL/L (ref 2–11)
AST SERPL-CCNC: 40 U/L (ref 15–37)
BASOPHILS # BLD: 0.1 K/UL (ref 0–0.2)
BASOPHILS NFR BLD: 1 % (ref 0–2)
BILIRUB SERPL-MCNC: 0.7 MG/DL (ref 0.2–1.1)
BUN SERPL-MCNC: 21 MG/DL (ref 8–23)
CALCIUM SERPL-MCNC: 9.7 MG/DL (ref 8.3–10.4)
CHLORIDE SERPL-SCNC: 106 MMOL/L (ref 101–110)
CHOLEST SERPL-MCNC: 78 MG/DL
CO2 SERPL-SCNC: 25 MMOL/L (ref 21–32)
CREAT SERPL-MCNC: 1.3 MG/DL (ref 0.8–1.5)
DIFFERENTIAL METHOD BLD: ABNORMAL
EOSINOPHIL # BLD: 0.1 K/UL (ref 0–0.8)
EOSINOPHIL NFR BLD: 2 % (ref 0.5–7.8)
ERYTHROCYTE [DISTWIDTH] IN BLOOD BY AUTOMATED COUNT: 14.1 % (ref 11.9–14.6)
EST. AVERAGE GLUCOSE BLD GHB EST-MCNC: 146 MG/DL
GLOBULIN SER CALC-MCNC: 3.3 G/DL (ref 2.8–4.5)
GLUCOSE SERPL-MCNC: 116 MG/DL (ref 65–100)
HBA1C MFR BLD: 6.7 % (ref 4.8–5.6)
HCT VFR BLD AUTO: 41.6 % (ref 41.1–50.3)
HDLC SERPL-MCNC: 26 MG/DL (ref 40–60)
HDLC SERPL: 3
HGB BLD-MCNC: 13.5 G/DL (ref 13.6–17.2)
IMM GRANULOCYTES # BLD AUTO: 0 K/UL (ref 0–0.5)
IMM GRANULOCYTES NFR BLD AUTO: 0 % (ref 0–5)
LDLC SERPL CALC-MCNC: 31.6 MG/DL
LYMPHOCYTES # BLD: 1.8 K/UL (ref 0.5–4.6)
LYMPHOCYTES NFR BLD: 27 % (ref 13–44)
MCH RBC QN AUTO: 29.2 PG (ref 26.1–32.9)
MCHC RBC AUTO-ENTMCNC: 32.5 G/DL (ref 31.4–35)
MCV RBC AUTO: 90 FL (ref 82–102)
MONOCYTES # BLD: 0.8 K/UL (ref 0.1–1.3)
MONOCYTES NFR BLD: 12 % (ref 4–12)
NEUTS SEG # BLD: 3.7 K/UL (ref 1.7–8.2)
NEUTS SEG NFR BLD: 58 % (ref 43–78)
NRBC # BLD: 0 K/UL (ref 0–0.2)
PLATELET # BLD AUTO: 185 K/UL (ref 150–450)
PMV BLD AUTO: 12.7 FL (ref 9.4–12.3)
POTASSIUM SERPL-SCNC: 5.3 MMOL/L (ref 3.5–5.1)
PROT SERPL-MCNC: 7.9 G/DL (ref 6.3–8.2)
PSA SERPL-MCNC: 0.8 NG/ML
RBC # BLD AUTO: 4.62 M/UL (ref 4.23–5.6)
SODIUM SERPL-SCNC: 138 MMOL/L (ref 133–143)
TRIGL SERPL-MCNC: 102 MG/DL (ref 35–150)
VLDLC SERPL CALC-MCNC: 20.4 MG/DL (ref 6–23)
WBC # BLD AUTO: 6.5 K/UL (ref 4.3–11.1)

## 2023-11-17 PROCEDURE — 73502 X-RAY EXAM HIP UNI 2-3 VIEWS: CPT

## 2023-11-17 PROCEDURE — 2022F DILAT RTA XM EVC RTNOPTHY: CPT | Performed by: INTERNAL MEDICINE

## 2023-11-17 PROCEDURE — 1036F TOBACCO NON-USER: CPT | Performed by: INTERNAL MEDICINE

## 2023-11-17 PROCEDURE — 3051F HG A1C>EQUAL 7.0%<8.0%: CPT | Performed by: INTERNAL MEDICINE

## 2023-11-17 PROCEDURE — G8427 DOCREV CUR MEDS BY ELIG CLIN: HCPCS | Performed by: INTERNAL MEDICINE

## 2023-11-17 PROCEDURE — 99214 OFFICE O/P EST MOD 30 MIN: CPT | Performed by: INTERNAL MEDICINE

## 2023-11-17 PROCEDURE — G8419 CALC BMI OUT NRM PARAM NOF/U: HCPCS | Performed by: INTERNAL MEDICINE

## 2023-11-17 PROCEDURE — 3017F COLORECTAL CA SCREEN DOC REV: CPT | Performed by: INTERNAL MEDICINE

## 2023-11-17 PROCEDURE — 3078F DIAST BP <80 MM HG: CPT | Performed by: INTERNAL MEDICINE

## 2023-11-17 PROCEDURE — G8484 FLU IMMUNIZE NO ADMIN: HCPCS | Performed by: INTERNAL MEDICINE

## 2023-11-17 PROCEDURE — 3074F SYST BP LT 130 MM HG: CPT | Performed by: INTERNAL MEDICINE

## 2023-11-17 RX ORDER — MELOXICAM 15 MG/1
15 TABLET ORAL DAILY
Qty: 30 TABLET | Refills: 5 | Status: SHIPPED | OUTPATIENT
Start: 2023-11-17 | End: 2024-05-15

## 2023-11-17 ASSESSMENT — ENCOUNTER SYMPTOMS
SHORTNESS OF BREATH: 0
BACK PAIN: 1

## 2023-11-17 NOTE — PROGRESS NOTES
Constitutional:       Appearance: Normal appearance. HENT:      Head: Normocephalic and atraumatic. Eyes:      Extraocular Movements: Extraocular movements intact. Pupils: Pupils are equal, round, and reactive to light. Cardiovascular:      Rate and Rhythm: Normal rate and regular rhythm. Heart sounds: Normal heart sounds. Pulmonary:      Effort: Pulmonary effort is normal.      Breath sounds: Normal breath sounds. Musculoskeletal:      Cervical back: Normal range of motion and neck supple. Comments: Decrease ROM of right hip  Difficulty with abduction   Skin:     General: Skin is warm and dry. Neurological:      Mental Status: He is alert and oriented to person, place, and time. Gait: Gait abnormal.   Psychiatric:         Mood and Affect: Mood normal.         Behavior: Behavior normal.         Thought Content: Thought content normal.         Judgment: Judgment normal.        Assessment/Plan:   Joan Purcell was seen today for follow-up. Diagnoses and all orders for this visit:    Controlled type 2 diabetes mellitus without complication, without long-term current use of insulin (HCC)  -     CBC with Auto Differential; Future  -     Comprehensive Metabolic Panel; Future  -     Hemoglobin A1C; Future    Essential hypertension  -     CBC with Auto Differential; Future  -     Comprehensive Metabolic Panel; Future    CAD, multiple vessel    Mixed hyperlipidemia  -     Lipid Panel; Future    Primary osteoarthritis involving multiple joints    Spinal stenosis of lumbar region with neurogenic claudication    Prostate cancer screening  -     PSA Screening; Future    Right hip pain  -     XR HIP RIGHT (2-3 VIEWS); Future    Other orders  -     meloxicam (MOBIC) 15 MG tablet; Take 1 tablet by mouth daily  -     metFORMIN (GLUCOPHAGE) 500 MG tablet;  Take 1 tablet by mouth 2 times daily (with meals)    Continue Mobic for DJD for now- states not really helping but prob is     Will xray hip for

## 2023-11-20 DIAGNOSIS — R74.8 ELEVATED LIVER ENZYMES: Primary | ICD-10-CM

## 2023-12-11 RX ORDER — OLMESARTAN MEDOXOMIL 20 MG/1
20 TABLET ORAL DAILY
Qty: 90 TABLET | Refills: 2 | Status: SHIPPED | OUTPATIENT
Start: 2023-12-11

## 2024-02-16 DIAGNOSIS — R74.8 ELEVATED LIVER ENZYMES: ICD-10-CM

## 2024-02-16 LAB
ALBUMIN SERPL-MCNC: 4.2 G/DL (ref 3.2–4.6)
ALBUMIN/GLOB SERPL: 1.2 (ref 0.4–1.6)
ALP SERPL-CCNC: 78 U/L (ref 50–136)
ALT SERPL-CCNC: 41 U/L (ref 12–65)
ANION GAP SERPL CALC-SCNC: 4 MMOL/L (ref 2–11)
AST SERPL-CCNC: 21 U/L (ref 15–37)
BILIRUB SERPL-MCNC: 1 MG/DL (ref 0.2–1.1)
BUN SERPL-MCNC: 19 MG/DL (ref 8–23)
CALCIUM SERPL-MCNC: 9.7 MG/DL (ref 8.3–10.4)
CHLORIDE SERPL-SCNC: 106 MMOL/L (ref 103–113)
CO2 SERPL-SCNC: 29 MMOL/L (ref 21–32)
CREAT SERPL-MCNC: 1.5 MG/DL (ref 0.8–1.5)
GLOBULIN SER CALC-MCNC: 3.6 G/DL (ref 2.8–4.5)
GLUCOSE SERPL-MCNC: 145 MG/DL (ref 65–100)
POTASSIUM SERPL-SCNC: 5.7 MMOL/L (ref 3.5–5.1)
PROT SERPL-MCNC: 7.8 G/DL (ref 6.3–8.2)
SODIUM SERPL-SCNC: 139 MMOL/L (ref 136–146)

## 2024-02-20 RX ORDER — HYDROCHLOROTHIAZIDE 12.5 MG/1
12.5 TABLET ORAL DAILY
Qty: 90 TABLET | Refills: 1 | Status: SHIPPED | OUTPATIENT
Start: 2024-02-20

## 2024-03-15 ENCOUNTER — OFFICE VISIT (OUTPATIENT)
Age: 64
End: 2024-03-15
Payer: COMMERCIAL

## 2024-03-15 VITALS
DIASTOLIC BLOOD PRESSURE: 78 MMHG | BODY MASS INDEX: 26.07 KG/M2 | WEIGHT: 162.2 LBS | HEIGHT: 66 IN | SYSTOLIC BLOOD PRESSURE: 130 MMHG | HEART RATE: 69 BPM

## 2024-03-15 DIAGNOSIS — E78.5 HYPERLIPIDEMIA LDL GOAL <70: ICD-10-CM

## 2024-03-15 DIAGNOSIS — E87.5 HYPERKALEMIA: ICD-10-CM

## 2024-03-15 DIAGNOSIS — I25.10 CAD, MULTIPLE VESSEL: Primary | ICD-10-CM

## 2024-03-15 DIAGNOSIS — E11.65 TYPE 2 DIABETES MELLITUS WITH HYPERGLYCEMIA, WITHOUT LONG-TERM CURRENT USE OF INSULIN (HCC): ICD-10-CM

## 2024-03-15 DIAGNOSIS — I10 ESSENTIAL HYPERTENSION: ICD-10-CM

## 2024-03-15 PROCEDURE — G8419 CALC BMI OUT NRM PARAM NOF/U: HCPCS | Performed by: INTERNAL MEDICINE

## 2024-03-15 PROCEDURE — 3046F HEMOGLOBIN A1C LEVEL >9.0%: CPT | Performed by: INTERNAL MEDICINE

## 2024-03-15 PROCEDURE — 93000 ELECTROCARDIOGRAM COMPLETE: CPT | Performed by: INTERNAL MEDICINE

## 2024-03-15 PROCEDURE — G8427 DOCREV CUR MEDS BY ELIG CLIN: HCPCS | Performed by: INTERNAL MEDICINE

## 2024-03-15 PROCEDURE — 2022F DILAT RTA XM EVC RTNOPTHY: CPT | Performed by: INTERNAL MEDICINE

## 2024-03-15 PROCEDURE — G8484 FLU IMMUNIZE NO ADMIN: HCPCS | Performed by: INTERNAL MEDICINE

## 2024-03-15 PROCEDURE — 3017F COLORECTAL CA SCREEN DOC REV: CPT | Performed by: INTERNAL MEDICINE

## 2024-03-15 PROCEDURE — 3078F DIAST BP <80 MM HG: CPT | Performed by: INTERNAL MEDICINE

## 2024-03-15 PROCEDURE — 99214 OFFICE O/P EST MOD 30 MIN: CPT | Performed by: INTERNAL MEDICINE

## 2024-03-15 PROCEDURE — 3075F SYST BP GE 130 - 139MM HG: CPT | Performed by: INTERNAL MEDICINE

## 2024-03-15 PROCEDURE — 1036F TOBACCO NON-USER: CPT | Performed by: INTERNAL MEDICINE

## 2024-03-15 RX ORDER — SEMAGLUTIDE 1.34 MG/ML
1 INJECTION, SOLUTION SUBCUTANEOUS
COMMUNITY
Start: 2024-02-28

## 2024-03-15 ASSESSMENT — ENCOUNTER SYMPTOMS
COUGH: 0
ABDOMINAL PAIN: 0
NAIL CHANGES: 0
EYE PAIN: 0
STRIDOR: 0
APHONIA: 0

## 2024-03-15 NOTE — PROGRESS NOTES
[facility] CARDIOLOGY, [address], [address]  [place] [address]  PHONE: [phone]    SUBJECTIVE:   Pranay Estevez Jr. is a 63 y.o. male 1960   seen for a follow up visit regarding the following:     Chief Complaint   Patient presents with    6 Month Follow-Up    Coronary Artery Disease    Extremity Weakness     Pain/weakness in legs         History of present illness: 63 y.o. male presented for follow-up 3/15/24 cost issues with ozempic. Last K 5.7 eating one banan a day.     Interval Hx    history of coronary artery disease status post coronary artery bypass grafting.  The patient was previously followed by Dr. Kahlil Hebert prior to his [facility].    Cardiac history:  10/2022 coronary artery bypass grafting LIMA to LAD SVG to diagonal SVG to indeterminate coronary artery SVG to PDA  9/2022  Left Ventricle: Normal left ventricular systolic function with a visually estimated EF of 50 - 55%. Left ventricle size is normal. Normal wall thickness. Normal wall motion. Normal diastolic function.Aortic Valve: Valve structure is normal. Mitral Valve: Mild regurgitation with a centrally directed jet.  3/15/2024 EKG Sinus Rhythm -Incomplete right bundle branch block.         Assessment:   Hyperkalemia  Avioud uts beans and legumes potatoes bananas most dairy products avocados salty foods fast foods processed meats, such as luncheon meats and hot dogs bran and whole grains spinach cantaloupe and honeydew tomatoes vegetable juices.  BMP today  Coronary artery disease  Hyperlipidemia  atorvastatin - 40 MG   Diabetes  controlled last A1c 6.7   metFORMIN - 500 MG   GL-P therapy   Hypertension  Cardiac Medications       Thiazides and Thiazide-Like Diuretics       hydroCHLOROthiazide 12.5 MG tablet Take 1 tablet by mouth daily       Beta Blockers Cardio-Selective       metoprolol succinate (TOPROL XL) 25 MG extended release tablet Take 1 tablet by mouth every morning       HMG CoA Reductase Inhibitors

## 2024-03-18 RX ORDER — ATORVASTATIN CALCIUM 40 MG/1
40 TABLET, FILM COATED ORAL DAILY
Qty: 90 TABLET | Refills: 1 | Status: SHIPPED | OUTPATIENT
Start: 2024-03-18

## 2024-05-16 ASSESSMENT — PATIENT HEALTH QUESTIONNAIRE - PHQ9
SUM OF ALL RESPONSES TO PHQ QUESTIONS 1-9: 0
SUM OF ALL RESPONSES TO PHQ9 QUESTIONS 1 & 2: 0
SUM OF ALL RESPONSES TO PHQ QUESTIONS 1-9: 0
2. FEELING DOWN, DEPRESSED OR HOPELESS: NOT AT ALL
1. LITTLE INTEREST OR PLEASURE IN DOING THINGS: NOT AT ALL
2. FEELING DOWN, DEPRESSED OR HOPELESS: NOT AT ALL
SUM OF ALL RESPONSES TO PHQ9 QUESTIONS 1 & 2: 0
1. LITTLE INTEREST OR PLEASURE IN DOING THINGS: NOT AT ALL

## 2024-05-17 ENCOUNTER — OFFICE VISIT (OUTPATIENT)
Dept: INTERNAL MEDICINE CLINIC | Facility: CLINIC | Age: 64
End: 2024-05-17
Payer: COMMERCIAL

## 2024-05-17 VITALS
SYSTOLIC BLOOD PRESSURE: 128 MMHG | DIASTOLIC BLOOD PRESSURE: 80 MMHG | WEIGHT: 161 LBS | BODY MASS INDEX: 25.88 KG/M2 | HEIGHT: 66 IN

## 2024-05-17 DIAGNOSIS — M79.18 MUSCLE ACHE OF EXTREMITY: ICD-10-CM

## 2024-05-17 DIAGNOSIS — E78.2 MIXED HYPERLIPIDEMIA: ICD-10-CM

## 2024-05-17 DIAGNOSIS — E11.9 CONTROLLED TYPE 2 DIABETES MELLITUS WITHOUT COMPLICATION, WITHOUT LONG-TERM CURRENT USE OF INSULIN (HCC): ICD-10-CM

## 2024-05-17 DIAGNOSIS — M15.9 PRIMARY OSTEOARTHRITIS INVOLVING MULTIPLE JOINTS: ICD-10-CM

## 2024-05-17 DIAGNOSIS — I10 ESSENTIAL HYPERTENSION: ICD-10-CM

## 2024-05-17 DIAGNOSIS — E11.9 CONTROLLED TYPE 2 DIABETES MELLITUS WITHOUT COMPLICATION, WITHOUT LONG-TERM CURRENT USE OF INSULIN (HCC): Primary | ICD-10-CM

## 2024-05-17 DIAGNOSIS — G72.0 STATIN MYOPATHY: ICD-10-CM

## 2024-05-17 DIAGNOSIS — T46.6X5A STATIN MYOPATHY: ICD-10-CM

## 2024-05-17 LAB
ALBUMIN SERPL-MCNC: 3.8 G/DL (ref 3.2–4.6)
ALBUMIN/GLOB SERPL: 1.3 (ref 1–1.9)
ALP SERPL-CCNC: 89 U/L (ref 40–129)
ALT SERPL-CCNC: 44 U/L (ref 12–65)
ANION GAP SERPL CALC-SCNC: 10 MMOL/L (ref 9–18)
AST SERPL-CCNC: 32 U/L (ref 15–37)
BASOPHILS # BLD: 0.1 K/UL (ref 0–0.2)
BASOPHILS NFR BLD: 1 % (ref 0–2)
BILIRUB SERPL-MCNC: 0.7 MG/DL (ref 0–1.2)
BUN SERPL-MCNC: 27 MG/DL (ref 8–23)
CALCIUM SERPL-MCNC: 9.4 MG/DL (ref 8.8–10.2)
CHLORIDE SERPL-SCNC: 102 MMOL/L (ref 98–107)
CHOLEST SERPL-MCNC: 117 MG/DL (ref 0–200)
CK SERPL-CCNC: 125 U/L (ref 21–215)
CO2 SERPL-SCNC: 26 MMOL/L (ref 20–28)
CREAT SERPL-MCNC: 1.32 MG/DL (ref 0.8–1.3)
DIFFERENTIAL METHOD BLD: ABNORMAL
EOSINOPHIL # BLD: 0.1 K/UL (ref 0–0.8)
EOSINOPHIL NFR BLD: 2 % (ref 0.5–7.8)
ERYTHROCYTE [DISTWIDTH] IN BLOOD BY AUTOMATED COUNT: 14.6 % (ref 11.9–14.6)
EST. AVERAGE GLUCOSE BLD GHB EST-MCNC: 159 MG/DL
GLOBULIN SER CALC-MCNC: 2.9 G/DL (ref 2.3–3.5)
GLUCOSE SERPL-MCNC: 132 MG/DL (ref 70–99)
HBA1C MFR BLD: 7.2 % (ref 0–5.6)
HCT VFR BLD AUTO: 38.8 % (ref 41.1–50.3)
HDLC SERPL-MCNC: 36 MG/DL (ref 40–60)
HDLC SERPL: 3.3 (ref 0–5)
HGB BLD-MCNC: 12.2 G/DL (ref 13.6–17.2)
IMM GRANULOCYTES # BLD AUTO: 0 K/UL (ref 0–0.5)
IMM GRANULOCYTES NFR BLD AUTO: 0 % (ref 0–5)
LDLC SERPL CALC-MCNC: 72 MG/DL (ref 0–100)
LYMPHOCYTES # BLD: 1.8 K/UL (ref 0.5–4.6)
LYMPHOCYTES NFR BLD: 25 % (ref 13–44)
MCH RBC QN AUTO: 29.7 PG (ref 26.1–32.9)
MCHC RBC AUTO-ENTMCNC: 31.4 G/DL (ref 31.4–35)
MCV RBC AUTO: 94.4 FL (ref 82–102)
MONOCYTES # BLD: 1 K/UL (ref 0.1–1.3)
MONOCYTES NFR BLD: 14 % (ref 4–12)
NEUTS SEG # BLD: 4.3 K/UL (ref 1.7–8.2)
NEUTS SEG NFR BLD: 58 % (ref 43–78)
NRBC # BLD: 0 K/UL (ref 0–0.2)
PLATELET # BLD AUTO: 167 K/UL (ref 150–450)
PMV BLD AUTO: 12.9 FL (ref 9.4–12.3)
POTASSIUM SERPL-SCNC: 5.3 MMOL/L (ref 3.5–5.1)
PROT SERPL-MCNC: 6.7 G/DL (ref 6.3–8.2)
RBC # BLD AUTO: 4.11 M/UL (ref 4.23–5.6)
SODIUM SERPL-SCNC: 138 MMOL/L (ref 136–145)
TRIGL SERPL-MCNC: 47 MG/DL (ref 0–150)
VLDLC SERPL CALC-MCNC: 9 MG/DL (ref 6–23)
WBC # BLD AUTO: 7.4 K/UL (ref 4.3–11.1)

## 2024-05-17 PROCEDURE — 3017F COLORECTAL CA SCREEN DOC REV: CPT | Performed by: INTERNAL MEDICINE

## 2024-05-17 PROCEDURE — 3046F HEMOGLOBIN A1C LEVEL >9.0%: CPT | Performed by: INTERNAL MEDICINE

## 2024-05-17 PROCEDURE — G8427 DOCREV CUR MEDS BY ELIG CLIN: HCPCS | Performed by: INTERNAL MEDICINE

## 2024-05-17 PROCEDURE — 3074F SYST BP LT 130 MM HG: CPT | Performed by: INTERNAL MEDICINE

## 2024-05-17 PROCEDURE — 2022F DILAT RTA XM EVC RTNOPTHY: CPT | Performed by: INTERNAL MEDICINE

## 2024-05-17 PROCEDURE — 3079F DIAST BP 80-89 MM HG: CPT | Performed by: INTERNAL MEDICINE

## 2024-05-17 PROCEDURE — 1036F TOBACCO NON-USER: CPT | Performed by: INTERNAL MEDICINE

## 2024-05-17 PROCEDURE — 99214 OFFICE O/P EST MOD 30 MIN: CPT | Performed by: INTERNAL MEDICINE

## 2024-05-17 PROCEDURE — G8419 CALC BMI OUT NRM PARAM NOF/U: HCPCS | Performed by: INTERNAL MEDICINE

## 2024-05-17 RX ORDER — ATORVASTATIN CALCIUM 40 MG/1
20 TABLET, FILM COATED ORAL DAILY
Qty: 90 TABLET | Refills: 2 | Status: SHIPPED | OUTPATIENT
Start: 2024-05-17

## 2024-05-17 RX ORDER — MELOXICAM 15 MG/1
15 TABLET ORAL DAILY
Qty: 90 TABLET | Refills: 2 | Status: SHIPPED | OUTPATIENT
Start: 2024-05-17 | End: 2025-02-11

## 2024-05-17 SDOH — ECONOMIC STABILITY: FOOD INSECURITY: WITHIN THE PAST 12 MONTHS, YOU WORRIED THAT YOUR FOOD WOULD RUN OUT BEFORE YOU GOT MONEY TO BUY MORE.: NEVER TRUE

## 2024-05-17 SDOH — ECONOMIC STABILITY: INCOME INSECURITY: HOW HARD IS IT FOR YOU TO PAY FOR THE VERY BASICS LIKE FOOD, HOUSING, MEDICAL CARE, AND HEATING?: NOT HARD AT ALL

## 2024-05-17 SDOH — ECONOMIC STABILITY: FOOD INSECURITY: WITHIN THE PAST 12 MONTHS, THE FOOD YOU BOUGHT JUST DIDN'T LAST AND YOU DIDN'T HAVE MONEY TO GET MORE.: NEVER TRUE

## 2024-05-17 ASSESSMENT — ENCOUNTER SYMPTOMS: SHORTNESS OF BREATH: 0

## 2024-05-17 NOTE — PROGRESS NOTES
HPI: Pranay Estevez Jr. (: 1960)    Has been having pain in both legs and hips and cramps at night  May be related to statin therapy    States very stiff and sore     Need to update labs      Problem List:  Patient Active Problem List   Diagnosis   • Mixed hyperlipidemia   • Other bursal cyst, right hand   • Restless leg syndrome   • Controlled type 2 diabetes mellitus without complication, without long-term current use of insulin ([facility])   • Weakness of both legs   • Bilateral foot pain   • Gastroesophageal reflux disease without esophagitis   • Essential hypertension   • High foot arch   • Chronic SI joint pain   • Agatston coronary artery calcium score greater than 400   • Abnormal stress test   • CAD, multiple vessel   • Dyspnea   • Coronary artery disease of native artery of native heart with stable angina pectoris ([facility])   • S/P CABG x 4   • Other specified diabetes mellitus with diabetic polyneuropathy, without long-term current use of insulin ([facility])   • Spinal stenosis of lumbar region with neurogenic claudication   • Primary osteoarthritis involving multiple joints       History:  Past Medical History:   Diagnosis Date   • Abnormal finding on EKG 2020    low voltage and incomplete RBBB   • Anxiety and depression    • Bilateral foot pain    • Chronic back pain    • Chronic back pain    • Coronary artery disease     scheduled for CABG 10/17/22   • Diabetes ([facility])     type 2- metformin- A1C 6.7 (10/13/22)- denies hypo episodes   • Former smoker, stopped smoking in distant past     quit -  0.5 ppd x 26 years   • H/O echocardiogram 2022    Echo LVEF 50-55%   • HTN (hypertension)    • Hx stress fracture     left foot   • Impingement syndrome of right shoulder     Dr. Urena   • Insomnia    • Lumbar spinal stenosis    • Periodontal disease due to type 2 diabetes mellitus ([facility])     Dr. Antonio Goodman- dentist   • Plantar fasciitis, bilateral    • RLS (restless legs syndrome)

## 2024-08-09 ENCOUNTER — PATIENT MESSAGE (OUTPATIENT)
Dept: ORTHOPEDIC SURGERY | Age: 64
End: 2024-08-09

## 2024-08-09 NOTE — TELEPHONE ENCOUNTER
From: Pranay Estevez Jr.  To: Dr. Jarad Faust  Sent: 8/9/2024 9:00 AM EDT  Subject: Appointment Request    Appointment Request From: Pranay Estevez Jr.    With Provider: Jarad Faust MD [[facility]]    Preferred Date Range: Any date 8/23/2024 or later    Preferred Times: Friday Morning    Reason for visit: Request an Appointment    Comments:  Right hand & forearm pain

## 2024-08-13 RX ORDER — HYDROCHLOROTHIAZIDE 12.5 MG/1
12.5 TABLET ORAL DAILY
Qty: 90 TABLET | Refills: 1 | Status: SHIPPED | OUTPATIENT
Start: 2024-08-13

## 2024-09-03 RX ORDER — OLMESARTAN MEDOXOMIL 20 MG/1
20 TABLET ORAL DAILY
Qty: 90 TABLET | Refills: 1 | Status: SHIPPED | OUTPATIENT
Start: 2024-09-03

## 2024-09-23 ENCOUNTER — OFFICE VISIT (OUTPATIENT)
Age: 64
End: 2024-09-23
Payer: COMMERCIAL

## 2024-09-23 VITALS
BODY MASS INDEX: 28.12 KG/M2 | WEIGHT: 175 LBS | HEIGHT: 66 IN | SYSTOLIC BLOOD PRESSURE: 130 MMHG | HEART RATE: 62 BPM | DIASTOLIC BLOOD PRESSURE: 72 MMHG

## 2024-09-23 DIAGNOSIS — I25.10 ASCVD (ARTERIOSCLEROTIC CARDIOVASCULAR DISEASE): ICD-10-CM

## 2024-09-23 DIAGNOSIS — I10 ESSENTIAL HYPERTENSION: Primary | ICD-10-CM

## 2024-09-23 DIAGNOSIS — E11.65 TYPE 2 DIABETES MELLITUS WITH HYPERGLYCEMIA, WITHOUT LONG-TERM CURRENT USE OF INSULIN (HCC): ICD-10-CM

## 2024-09-23 DIAGNOSIS — Z78.9 STATIN INTOLERANCE: ICD-10-CM

## 2024-09-23 PROCEDURE — 1124F ACP DISCUSS-NO DSCNMKR DOCD: CPT | Performed by: INTERNAL MEDICINE

## 2024-09-23 PROCEDURE — 3075F SYST BP GE 130 - 139MM HG: CPT | Performed by: INTERNAL MEDICINE

## 2024-09-23 PROCEDURE — 3051F HG A1C>EQUAL 7.0%<8.0%: CPT | Performed by: INTERNAL MEDICINE

## 2024-09-23 PROCEDURE — 3078F DIAST BP <80 MM HG: CPT | Performed by: INTERNAL MEDICINE

## 2024-09-23 PROCEDURE — 99214 OFFICE O/P EST MOD 30 MIN: CPT | Performed by: INTERNAL MEDICINE

## 2024-09-23 RX ORDER — SEMAGLUTIDE 1.34 MG/ML
1 INJECTION, SOLUTION SUBCUTANEOUS
Qty: 4 ADJUSTABLE DOSE PRE-FILLED PEN SYRINGE | Refills: 3 | Status: SHIPPED | OUTPATIENT
Start: 2024-09-23

## 2024-09-23 RX ORDER — EZETIMIBE 10 MG/1
10 TABLET ORAL DAILY
Qty: 30 TABLET | Refills: 3 | Status: SHIPPED | OUTPATIENT
Start: 2024-09-23

## 2024-09-23 RX ORDER — TRAMADOL HYDROCHLORIDE 50 MG/1
50 TABLET ORAL 2 TIMES DAILY
COMMUNITY
Start: 2024-09-20

## 2024-09-23 ASSESSMENT — ENCOUNTER SYMPTOMS
APHONIA: 0
ABDOMINAL PAIN: 0
STRIDOR: 0
EYE PAIN: 0
NAIL CHANGES: 0
COUGH: 0

## 2024-09-24 ENCOUNTER — CLINICAL DOCUMENTATION (OUTPATIENT)
Age: 64
End: 2024-09-24

## 2024-09-25 ENCOUNTER — OFFICE VISIT (OUTPATIENT)
Age: 64
End: 2024-09-25

## 2024-09-25 DIAGNOSIS — G56.01 RIGHT CARPAL TUNNEL SYNDROME: ICD-10-CM

## 2024-09-25 DIAGNOSIS — M65.90 TENOSYNOVITIS: ICD-10-CM

## 2024-09-25 DIAGNOSIS — M18.11 ARTHRITIS OF CARPOMETACARPAL (CMC) JOINT OF RIGHT THUMB: ICD-10-CM

## 2024-09-25 DIAGNOSIS — M79.641 PAIN OF RIGHT HAND: ICD-10-CM

## 2024-09-25 DIAGNOSIS — M19.90 INFLAMMATORY ARTHROPATHY: ICD-10-CM

## 2024-09-25 DIAGNOSIS — M79.641 PAIN OF RIGHT HAND: Primary | ICD-10-CM

## 2024-09-25 LAB
ALBUMIN SERPL-MCNC: 4 G/DL (ref 3.2–4.6)
ALBUMIN/GLOB SERPL: 1.4 (ref 1–1.9)
ALP SERPL-CCNC: 91 U/L (ref 40–129)
ALT SERPL-CCNC: 20 U/L (ref 8–55)
ANION GAP SERPL CALC-SCNC: 9 MMOL/L (ref 9–18)
AST SERPL-CCNC: 23 U/L (ref 15–37)
BASOPHILS # BLD: 0.1 K/UL (ref 0–0.2)
BASOPHILS NFR BLD: 1 % (ref 0–2)
BILIRUB SERPL-MCNC: 0.6 MG/DL (ref 0–1.2)
BUN SERPL-MCNC: 29 MG/DL (ref 8–23)
CALCIUM SERPL-MCNC: 9.5 MG/DL (ref 8.8–10.2)
CHLORIDE SERPL-SCNC: 102 MMOL/L (ref 98–107)
CO2 SERPL-SCNC: 27 MMOL/L (ref 20–28)
CREAT SERPL-MCNC: 1.27 MG/DL (ref 0.8–1.3)
DIFFERENTIAL METHOD BLD: ABNORMAL
EOSINOPHIL # BLD: 0.2 K/UL (ref 0–0.8)
EOSINOPHIL NFR BLD: 3 % (ref 0.5–7.8)
ERYTHROCYTE [DISTWIDTH] IN BLOOD BY AUTOMATED COUNT: 13.8 % (ref 11.9–14.6)
ERYTHROCYTE [SEDIMENTATION RATE] IN BLOOD: 7 MM/HR
GLOBULIN SER CALC-MCNC: 3 G/DL (ref 2.3–3.5)
GLUCOSE SERPL-MCNC: 120 MG/DL (ref 70–99)
HCT VFR BLD AUTO: 39.2 % (ref 41.1–50.3)
HGB BLD-MCNC: 12.8 G/DL (ref 13.6–17.2)
IMM GRANULOCYTES # BLD AUTO: 0 K/UL (ref 0–0.5)
IMM GRANULOCYTES NFR BLD AUTO: 0 % (ref 0–5)
LYMPHOCYTES # BLD: 1.7 K/UL (ref 0.5–4.6)
LYMPHOCYTES NFR BLD: 28 % (ref 13–44)
MCH RBC QN AUTO: 29.4 PG (ref 26.1–32.9)
MCHC RBC AUTO-ENTMCNC: 32.7 G/DL (ref 31.4–35)
MCV RBC AUTO: 90.1 FL (ref 82–102)
MONOCYTES # BLD: 0.9 K/UL (ref 0.1–1.3)
MONOCYTES NFR BLD: 16 % (ref 4–12)
NEUTS SEG # BLD: 3.1 K/UL (ref 1.7–8.2)
NEUTS SEG NFR BLD: 52 % (ref 43–78)
NRBC # BLD: 0 K/UL (ref 0–0.2)
PLATELET # BLD AUTO: 164 K/UL (ref 150–450)
PMV BLD AUTO: 14 FL (ref 9.4–12.3)
POTASSIUM SERPL-SCNC: 5.5 MMOL/L (ref 3.5–5.1)
PROT SERPL-MCNC: 7 G/DL (ref 6.3–8.2)
RBC # BLD AUTO: 4.35 M/UL (ref 4.23–5.6)
SODIUM SERPL-SCNC: 137 MMOL/L (ref 136–145)
URATE SERPL-MCNC: 8.3 MG/DL (ref 3.9–8.2)
WBC # BLD AUTO: 5.9 K/UL (ref 4.3–11.1)

## 2024-09-25 RX ORDER — BETAMETHASONE SODIUM PHOSPHATE AND BETAMETHASONE ACETATE 3; 3 MG/ML; MG/ML
6 INJECTION, SUSPENSION INTRA-ARTICULAR; INTRALESIONAL; INTRAMUSCULAR; SOFT TISSUE ONCE
Status: SHIPPED | OUTPATIENT
Start: 2024-09-25

## 2024-09-26 LAB — RHEUMATOID FACT SER QL LA: NEGATIVE

## 2024-09-27 LAB
ANA SER QL: NEGATIVE
CCP IGA+IGG SERPL IA-ACNC: 7 UNITS (ref 0–19)

## 2024-09-28 LAB — CRP SERPL-MCNC: 4 MG/L (ref 0–10)

## 2024-10-01 RX ORDER — MELOXICAM 15 MG/1
15 TABLET ORAL DAILY
Qty: 28 TABLET | Refills: 0 | Status: SHIPPED | OUTPATIENT
Start: 2024-10-01 | End: 2024-10-29

## 2024-10-01 RX ORDER — INDOMETHACIN 25 MG/1
75 CAPSULE ORAL 2 TIMES DAILY WITH MEALS
Qty: 126 CAPSULE | Refills: 0 | Status: SHIPPED | OUTPATIENT
Start: 2024-10-01 | End: 2024-10-22

## 2024-10-01 RX ORDER — COLCHICINE 0.6 MG/1
0.6 TABLET ORAL 2 TIMES DAILY
Qty: 42 TABLET | Refills: 0 | Status: SHIPPED | OUTPATIENT
Start: 2024-10-01 | End: 2024-10-22

## 2024-10-01 NOTE — PROGRESS NOTES
Orthopaedic Hand Clinic Note    Name: Pranay Estevez Jr.  Age: 64 y.o.  YOB: 1960  Gender: male  MRN: [id number]      Follow up visit:   1. Pain of right hand    2. Right carpal tunnel syndrome    3. Arthritis of carpometacarpal (CMC) joint of right thumb    4. Inflammatory arthropathy    5. Tenosynovitis        HPI: Pranay Estevez Jr. is a 64 y.o. male who is following up for right forearm pain, his symptoms have changed somewhat, he is complaining of numbness and paresthesias in all fingers as well as radiating pain from the wrist into the forearm, he also has pain at the base of the right thumb.  Patient reports stiffness in the mornings with severe inability make a fist however, this takes about 1 hour to improve.      ROS/Meds/PSH/PMH/FH/SH: I personally reviewed the patients standard intake form.  Pertinents are discussed in the HPI    Physical Examination:  General: Awake and alert.  HEENT: Normocephalic, atraumatic  CV/Pulm: Breathing even and unlabored  Skin: No obvious rashes noted.  Lymphatic: No obvious evidence of lymphedema or lymphadenopathy    Musculoskeletal Examination:  Examination on the right upper extremity demonstrates cap refill < 5 seconds in all fingers, subjectively decrease sensation in median distribution, positive Tinel at the carpal tunnel but negative carpal tunnel compression test, severe tenderness palpation of the right thumb CMC joint.    Imaging / Electrodiagnostic Tests:     right Hand XR: AP, Lateral, Oblique and Thumb CMC joint     Clinical Indication:  1. Pain of right hand    2. Right carpal tunnel syndrome    3. Arthritis of carpometacarpal (CMC) joint of right thumb    4. Inflammatory arthropathy    5. Tenosynovitis           Report: AP, lateral, oblique and thumb CMC joint x-ray demonstrates joint space narrowing, subluxation and osteophytic changes of the trapezium consistent with osteoarthritis of the thumb CMC joint    Impression: Thumb CMC

## 2024-10-02 LAB — HLA-B27 QL NAA+PROBE: NEGATIVE

## 2024-10-16 RX ORDER — METOPROLOL SUCCINATE 25 MG/1
25 TABLET, EXTENDED RELEASE ORAL EVERY MORNING
Qty: 90 TABLET | Refills: 1 | Status: SHIPPED | OUTPATIENT
Start: 2024-10-16

## 2024-10-16 RX ORDER — OLMESARTAN MEDOXOMIL 20 MG/1
20 TABLET ORAL DAILY
Qty: 90 TABLET | Refills: 1 | Status: SHIPPED | OUTPATIENT
Start: 2024-10-16

## 2024-11-02 ENCOUNTER — PATIENT MESSAGE (OUTPATIENT)
Age: 64
End: 2024-11-02

## 2024-11-02 DIAGNOSIS — M79.641 PAIN OF RIGHT HAND: Primary | ICD-10-CM

## 2024-11-08 ENCOUNTER — PROCEDURE VISIT (OUTPATIENT)
Dept: NEUROLOGY | Age: 64
End: 2024-11-08

## 2024-11-08 VITALS — WEIGHT: 165 LBS | HEART RATE: 72 BPM | HEIGHT: 66 IN | BODY MASS INDEX: 26.52 KG/M2 | OXYGEN SATURATION: 99 %

## 2024-11-08 DIAGNOSIS — R20.0 NUMBNESS AND TINGLING IN BOTH HANDS: Primary | ICD-10-CM

## 2024-11-08 DIAGNOSIS — R20.2 NUMBNESS AND TINGLING IN BOTH HANDS: Primary | ICD-10-CM

## 2024-11-08 NOTE — PROGRESS NOTES
EMG/Nerve Conduction Study Procedure Note  [address]    [address]  [place]  [address]   [phone]      Hx:    Exam:     64 y.o.  W male     EMG   upper...    Paresthesias.  No atrophy.  Diabetic.  History of polyneuropathy.  No Eveline.  No Pretty.  Referring: Dr. Govidn SULLIVAN hand     Technologist: Milton Salmon  Height: 5 foot 6 inch    Summary      EMG upper extremity limited hand muscle.  With CV studies.                Controlled environmental factors / EMG lab.  Temperature.   NCV : sensory segments:          Abnormal = bilateral median nerve distal SCV slowing with attenuated SNAP worse on the right.  Normal bilateral ulnar bilateral radial SCV.  NCV transcarpal sensory segments:        Abnormal = bilateral transcarpal median slowing with prolonged peak difference of latency right side at 0.73 ms (UL = 0.20 ms); left side 0.46 ms.  NCV Motor MCV segments:      Abnormal = right median TL 4.42 ms (UL = 4.15 ms) with attenuated CMAP.  Left median borderline with some attenuation CMAP only.  Normal bilateral ulnar MCV.       F-wave studies:         Normal bilateral median bilateral ulnar F-wave latencies.   NEEDLE EMG:   Tested muscles::       Abnormal right APB = a few giant type MUP and some discrete activity but mixed with reduced recruitment only but without any fibrillation positive sharp waves or fasciculations.  Normal left APB.            INTERPRETATION:     ELECTROPHYSIOLOGIC EVIDENCE OF BILATERAL MEDIAN NERVE ENTRAPMENT AT THE WRIST CARPAL SEGMENTS.  RIGHT SIDE IS WORSE WITH MOTOR SEGMENTS INVOLVED IN A SLIGHT AMOUNT OF EVIDENCE FOR OLD INACTIVE AXONAL DENERVATION.            CONCLUSION:      Bilateral carpal tunnel syndromes = right side is moderately severe left side is mild.              Procedure Details:       Moderate right carpal tunnel syndrome and mild left carpal tunnel syndrome.    Patient made fully aware.                  Please Note::     Data and waveforms * filed under

## 2024-11-11 RX ORDER — COLCHICINE 0.6 MG/1
0.6 TABLET ORAL 2 TIMES DAILY
Qty: 42 TABLET | Refills: 0 | Status: SHIPPED | OUTPATIENT
Start: 2024-11-11 | End: 2024-12-02

## 2024-11-11 RX ORDER — INDOMETHACIN 25 MG/1
25 CAPSULE ORAL 2 TIMES DAILY WITH MEALS
Qty: 42 CAPSULE | Refills: 0 | Status: SHIPPED | OUTPATIENT
Start: 2024-11-11 | End: 2024-12-02

## 2024-11-22 ENCOUNTER — OFFICE VISIT (OUTPATIENT)
Dept: INTERNAL MEDICINE CLINIC | Facility: CLINIC | Age: 64
End: 2024-11-22
Payer: COMMERCIAL

## 2024-11-22 VITALS
WEIGHT: 163.2 LBS | DIASTOLIC BLOOD PRESSURE: 64 MMHG | HEIGHT: 66 IN | BODY MASS INDEX: 26.23 KG/M2 | SYSTOLIC BLOOD PRESSURE: 110 MMHG

## 2024-11-22 DIAGNOSIS — I10 ESSENTIAL HYPERTENSION: ICD-10-CM

## 2024-11-22 DIAGNOSIS — K21.9 GASTROESOPHAGEAL REFLUX DISEASE WITHOUT ESOPHAGITIS: ICD-10-CM

## 2024-11-22 DIAGNOSIS — E11.9 CONTROLLED TYPE 2 DIABETES MELLITUS WITHOUT COMPLICATION, WITHOUT LONG-TERM CURRENT USE OF INSULIN (HCC): Primary | ICD-10-CM

## 2024-11-22 DIAGNOSIS — M15.0 PRIMARY OSTEOARTHRITIS INVOLVING MULTIPLE JOINTS: ICD-10-CM

## 2024-11-22 DIAGNOSIS — E78.2 MIXED HYPERLIPIDEMIA: ICD-10-CM

## 2024-11-22 DIAGNOSIS — E11.9 CONTROLLED TYPE 2 DIABETES MELLITUS WITHOUT COMPLICATION, WITHOUT LONG-TERM CURRENT USE OF INSULIN (HCC): ICD-10-CM

## 2024-11-22 DIAGNOSIS — Z12.5 PROSTATE CANCER SCREENING: ICD-10-CM

## 2024-11-22 DIAGNOSIS — M79.671 BILATERAL FOOT PAIN: ICD-10-CM

## 2024-11-22 DIAGNOSIS — G56.03 BILATERAL CARPAL TUNNEL SYNDROME: ICD-10-CM

## 2024-11-22 DIAGNOSIS — M79.672 BILATERAL FOOT PAIN: ICD-10-CM

## 2024-11-22 DIAGNOSIS — I25.10 CAD, MULTIPLE VESSEL: ICD-10-CM

## 2024-11-22 LAB
ALBUMIN SERPL-MCNC: 4.2 G/DL (ref 3.2–4.6)
ALBUMIN/GLOB SERPL: 1.5 (ref 1–1.9)
ALP SERPL-CCNC: 101 U/L (ref 40–129)
ALT SERPL-CCNC: 26 U/L (ref 8–55)
ANION GAP SERPL CALC-SCNC: 9 MMOL/L (ref 7–16)
AST SERPL-CCNC: 31 U/L (ref 15–37)
BASOPHILS # BLD: 0.1 K/UL (ref 0–0.2)
BASOPHILS NFR BLD: 1 % (ref 0–2)
BILIRUB SERPL-MCNC: 0.5 MG/DL (ref 0–1.2)
BUN SERPL-MCNC: 33 MG/DL (ref 8–23)
CALCIUM SERPL-MCNC: 9.4 MG/DL (ref 8.8–10.2)
CHLORIDE SERPL-SCNC: 100 MMOL/L (ref 98–107)
CHOLEST SERPL-MCNC: 109 MG/DL (ref 0–200)
CO2 SERPL-SCNC: 27 MMOL/L (ref 20–29)
CREAT SERPL-MCNC: 1.49 MG/DL (ref 0.8–1.3)
DIFFERENTIAL METHOD BLD: ABNORMAL
EOSINOPHIL # BLD: 0.1 K/UL (ref 0–0.8)
EOSINOPHIL NFR BLD: 2 % (ref 0.5–7.8)
ERYTHROCYTE [DISTWIDTH] IN BLOOD BY AUTOMATED COUNT: 14.6 % (ref 11.9–14.6)
EST. AVERAGE GLUCOSE BLD GHB EST-MCNC: 138 MG/DL
GLOBULIN SER CALC-MCNC: 2.8 G/DL (ref 2.3–3.5)
GLUCOSE SERPL-MCNC: 94 MG/DL (ref 70–99)
HBA1C MFR BLD: 6.4 % (ref 0–5.6)
HCT VFR BLD AUTO: 40.5 % (ref 41.1–50.3)
HDLC SERPL-MCNC: 24 MG/DL (ref 40–60)
HDLC SERPL: 4.6 (ref 0–5)
HGB BLD-MCNC: 13.3 G/DL (ref 13.6–17.2)
IMM GRANULOCYTES # BLD AUTO: 0 K/UL (ref 0–0.5)
IMM GRANULOCYTES NFR BLD AUTO: 0 % (ref 0–5)
LDLC SERPL CALC-MCNC: 49 MG/DL (ref 0–100)
LYMPHOCYTES # BLD: 1.3 K/UL (ref 0.5–4.6)
LYMPHOCYTES NFR BLD: 25 % (ref 13–44)
MCH RBC QN AUTO: 28.5 PG (ref 26.1–32.9)
MCHC RBC AUTO-ENTMCNC: 32.8 G/DL (ref 31.4–35)
MCV RBC AUTO: 86.9 FL (ref 82–102)
MONOCYTES # BLD: 0.9 K/UL (ref 0.1–1.3)
MONOCYTES NFR BLD: 17 % (ref 4–12)
NEUTS SEG # BLD: 2.6 K/UL (ref 1.7–8.2)
NEUTS SEG NFR BLD: 55 % (ref 43–78)
NRBC # BLD: 0 K/UL (ref 0–0.2)
PLATELET # BLD AUTO: 188 K/UL (ref 150–450)
PMV BLD AUTO: 13.2 FL (ref 9.4–12.3)
POTASSIUM SERPL-SCNC: 6.3 MMOL/L (ref 3.5–5.1)
PROT SERPL-MCNC: 7 G/DL (ref 6.3–8.2)
PSA SERPL-MCNC: 0.6 NG/ML (ref 0–4)
RBC # BLD AUTO: 4.66 M/UL (ref 4.23–5.6)
SODIUM SERPL-SCNC: 135 MMOL/L (ref 136–145)
TRIGL SERPL-MCNC: 182 MG/DL (ref 0–150)
VLDLC SERPL CALC-MCNC: 36 MG/DL (ref 6–23)
WBC # BLD AUTO: 5 K/UL (ref 4.3–11.1)

## 2024-11-22 PROCEDURE — 3078F DIAST BP <80 MM HG: CPT | Performed by: INTERNAL MEDICINE

## 2024-11-22 PROCEDURE — 3074F SYST BP LT 130 MM HG: CPT | Performed by: INTERNAL MEDICINE

## 2024-11-22 PROCEDURE — 1124F ACP DISCUSS-NO DSCNMKR DOCD: CPT | Performed by: INTERNAL MEDICINE

## 2024-11-22 PROCEDURE — 3051F HG A1C>EQUAL 7.0%<8.0%: CPT | Performed by: INTERNAL MEDICINE

## 2024-11-22 PROCEDURE — 99214 OFFICE O/P EST MOD 30 MIN: CPT | Performed by: INTERNAL MEDICINE

## 2024-11-22 ASSESSMENT — ENCOUNTER SYMPTOMS
BACK PAIN: 1
SHORTNESS OF BREATH: 0

## 2024-11-22 NOTE — PROGRESS NOTES
HPI: Pranay KIMBROUGH Zenaida Turner (: 1960)      Saw Dr Faust- uric acid elevated and has bilateral CTS but right greater than left    Needs to update labs    Gets some chest wall pain at times that he notices and can reproduce it by pressing on his chest      Problem List:  Patient Active Problem List   Diagnosis   • Mixed hyperlipidemia   • Other bursal cyst, right hand   • Restless leg syndrome   • Controlled type 2 diabetes mellitus without complication, without long-term current use of insulin ([facility])   • Weakness of both legs   • Bilateral foot pain   • Gastroesophageal reflux disease without esophagitis   • Essential hypertension   • High foot arch   • Chronic SI joint pain   • Agatston coronary artery calcium score greater than 400   • Abnormal stress test   • CAD, multiple vessel   • Dyspnea   • Coronary artery disease of native artery of native heart with stable angina pectoris ([facility])   • S/P CABG x 4   • Other specified diabetes mellitus with diabetic polyneuropathy, without long-term current use of insulin ([facility])   • Spinal stenosis of lumbar region with neurogenic claudication   • Primary osteoarthritis involving multiple joints   • Bilateral carpal tunnel syndrome       History:  Past Medical History:   Diagnosis Date   • Abnormal finding on EKG 2020    low voltage and incomplete RBBB   • Anxiety and depression    • Bilateral foot pain    • Chronic back pain    • Chronic back pain    • Coronary artery disease     scheduled for CABG 10/17/22   • Diabetes ([facility])     type 2- metformin- A1C 6.7 (10/13/22)- denies hypo episodes   • Former smoker, stopped smoking in distant past     quit -  0.5 ppd x 26 years   • H/O echocardiogram 2022    Echo LVEF 50-55%   • HTN (hypertension)    • Hx stress fracture     left foot   • Impingement syndrome of right shoulder     Dr. Urena   • Insomnia    • Lumbar spinal stenosis    • Periodontal disease due to type 2 diabetes mellitus ([facility])     Dr. Alvarez

## 2024-11-25 DIAGNOSIS — N18.32 STAGE 3B CHRONIC KIDNEY DISEASE (HCC): Primary | ICD-10-CM

## 2024-11-29 DIAGNOSIS — N18.32 STAGE 3B CHRONIC KIDNEY DISEASE (HCC): ICD-10-CM

## 2024-12-02 LAB
ANION GAP SERPL CALC-SCNC: 10 MMOL/L (ref 7–16)
BUN SERPL-MCNC: 21 MG/DL (ref 8–23)
CALCIUM SERPL-MCNC: 9.5 MG/DL (ref 8.8–10.2)
CHLORIDE SERPL-SCNC: 100 MMOL/L (ref 98–107)
CO2 SERPL-SCNC: 26 MMOL/L (ref 20–29)
CREAT SERPL-MCNC: 1.43 MG/DL (ref 0.8–1.3)
GLUCOSE SERPL-MCNC: 126 MG/DL (ref 70–99)
POTASSIUM SERPL-SCNC: 4.9 MMOL/L (ref 3.5–5.1)
SODIUM SERPL-SCNC: 136 MMOL/L (ref 136–145)

## 2024-12-06 ENCOUNTER — OFFICE VISIT (OUTPATIENT)
Age: 64
End: 2024-12-06
Payer: COMMERCIAL

## 2024-12-06 DIAGNOSIS — M10.9 ACUTE GOUT, UNSPECIFIED CAUSE, UNSPECIFIED SITE: Primary | ICD-10-CM

## 2024-12-06 DIAGNOSIS — G56.01 RIGHT CARPAL TUNNEL SYNDROME: ICD-10-CM

## 2024-12-06 DIAGNOSIS — M18.11 ARTHRITIS OF CARPOMETACARPAL (CMC) JOINT OF RIGHT THUMB: ICD-10-CM

## 2024-12-06 DIAGNOSIS — M19.90 INFLAMMATORY ARTHROPATHY: ICD-10-CM

## 2024-12-06 PROCEDURE — 1124F ACP DISCUSS-NO DSCNMKR DOCD: CPT | Performed by: ORTHOPAEDIC SURGERY

## 2024-12-06 PROCEDURE — 99214 OFFICE O/P EST MOD 30 MIN: CPT | Performed by: ORTHOPAEDIC SURGERY

## 2024-12-06 RX ORDER — INDOMETHACIN 75 MG/1
75 CAPSULE, EXTENDED RELEASE ORAL 2 TIMES DAILY WITH MEALS
Qty: 60 CAPSULE | Refills: 1 | Status: SHIPPED | OUTPATIENT
Start: 2024-12-06 | End: 2025-02-04

## 2024-12-06 RX ORDER — COLCHICINE 0.6 MG/1
0.6 TABLET ORAL 2 TIMES DAILY
Qty: 42 TABLET | Refills: 1 | Status: SHIPPED | OUTPATIENT
Start: 2024-12-06 | End: 2025-01-17

## 2024-12-06 NOTE — PROGRESS NOTES
[facility] Hand [facility] Note    Name: Pranay Estevez Jr.  Age: 64 y.o.  YOB: 1960  Gender: male  MRN: [id number]      Follow up visit:   1. Acute gout, unspecified cause, unspecified site    2. Inflammatory arthropathy    3. Arthritis of carpometacarpal (CMC) joint of right thumb    4. Right carpal tunnel syndrome        HPI: Parnay Estevez Jr. is a 64 y.o. male who is following up for multiple issues including right carpal tunnel syndrome, right thumb CMC arthritis and right hand gout, he has been taking Indocin and colchicine, I also performed a right carpal tunnel injection and a right thumb CMC joint injection 2 and half months ago, patient reports he is much better, stiffness, numbness, pain are all improved.      ROS/Meds/PSH/PMH/FH/SH: I personally reviewed the patients standard intake form.  Pertinents are discussed in the HPI    Physical Examination:  General: Awake and alert.  HEENT: Normocephalic, atraumatic  CV/Pulm: Breathing even and unlabored  Skin: No obvious rashes noted.  Lymphatic: No obvious evidence of lymphedema or lymphadenopathy    Musculoskeletal Examination:  Examination on the right upper extremity demonstrates cap refill < 5 seconds in all fingers, normal sensation in all fingers, positive provocative maneuvers for carpal tunnel syndrome, tenderness palpation of the right thumb CMC joint is mild today, he can make a full fist.    Imaging / Electrodiagnostic Tests:     Laboratory work was reviewed with the patient, negative rheumatoid factor, negative antinuclear antibody, elevated uric acid at 8.4    Nerve conduction study was reviewed with the patient, this demonstrates moderate right carpal tunnel syndrome with sensory latency of 4.5, motor latency of 4.4, very mild left carpal tunnel syndrome with sensory latency of 3.8, motor latency of 3.9    Assessment:   1. Acute gout, unspecified cause, unspecified site    2. Inflammatory arthropathy    3. Arthritis of

## 2025-01-13 DIAGNOSIS — M10.9 ACUTE GOUT, UNSPECIFIED CAUSE, UNSPECIFIED SITE: ICD-10-CM

## 2025-01-13 DIAGNOSIS — M19.90 INFLAMMATORY ARTHROPATHY: ICD-10-CM

## 2025-01-13 RX ORDER — INDOMETHACIN 75 MG/1
75 CAPSULE, EXTENDED RELEASE ORAL 2 TIMES DAILY WITH MEALS
Qty: 60 CAPSULE | Refills: 1 | OUTPATIENT
Start: 2025-01-13 | End: 2025-03-14

## 2025-01-17 DIAGNOSIS — M10.9 ACUTE GOUT, UNSPECIFIED CAUSE, UNSPECIFIED SITE: ICD-10-CM

## 2025-01-17 DIAGNOSIS — M19.90 INFLAMMATORY ARTHROPATHY: ICD-10-CM

## 2025-01-20 RX ORDER — COLCHICINE 0.6 MG/1
0.6 TABLET ORAL 2 TIMES DAILY
Qty: 42 TABLET | Refills: 1 | OUTPATIENT
Start: 2025-01-20

## 2025-02-03 RX ORDER — HYDROCHLOROTHIAZIDE 12.5 MG/1
12.5 TABLET ORAL DAILY
Qty: 90 TABLET | Refills: 2 | Status: SHIPPED | OUTPATIENT
Start: 2025-02-03

## 2025-02-14 DIAGNOSIS — M19.90 INFLAMMATORY ARTHROPATHY: ICD-10-CM

## 2025-02-14 DIAGNOSIS — M10.9 ACUTE GOUT, UNSPECIFIED CAUSE, UNSPECIFIED SITE: ICD-10-CM

## 2025-02-14 RX ORDER — COLCHICINE 0.6 MG/1
0.6 TABLET ORAL 2 TIMES DAILY
Qty: 42 TABLET | Refills: 1 | OUTPATIENT
Start: 2025-02-14

## 2025-02-21 ENCOUNTER — OFFICE VISIT (OUTPATIENT)
Age: 65
End: 2025-02-21

## 2025-02-21 DIAGNOSIS — M18.11 ARTHRITIS OF CARPOMETACARPAL (CMC) JOINT OF RIGHT THUMB: ICD-10-CM

## 2025-02-21 DIAGNOSIS — G56.01 RIGHT CARPAL TUNNEL SYNDROME: ICD-10-CM

## 2025-02-21 DIAGNOSIS — M19.90 INFLAMMATORY ARTHROPATHY: Primary | ICD-10-CM

## 2025-02-21 DIAGNOSIS — M10.9 ACUTE GOUT, UNSPECIFIED CAUSE, UNSPECIFIED SITE: ICD-10-CM

## 2025-02-21 RX ORDER — BETAMETHASONE SODIUM PHOSPHATE AND BETAMETHASONE ACETATE 3; 3 MG/ML; MG/ML
6 INJECTION, SUSPENSION INTRA-ARTICULAR; INTRALESIONAL; INTRAMUSCULAR; SOFT TISSUE ONCE
Status: COMPLETED | OUTPATIENT
Start: 2025-02-21 | End: 2025-02-21

## 2025-02-21 RX ADMIN — BETAMETHASONE SODIUM PHOSPHATE AND BETAMETHASONE ACETATE 6 MG: 3; 3 INJECTION, SUSPENSION INTRA-ARTICULAR; INTRALESIONAL; INTRAMUSCULAR; SOFT TISSUE at 10:44

## 2025-02-21 NOTE — PROGRESS NOTES
Orthopaedic Hand Clinic Note    Name: Pranay Estevez Jr.  Age: 64 y.o.  YOB: 1960  Gender: male  MRN: [id number]      Follow up visit:   1. Inflammatory arthropathy    2. Acute gout, unspecified cause, unspecified site    3. Arthritis of carpometacarpal (CMC) joint of right thumb    4. Right carpal tunnel syndrome        HPI: Pranay Estevez Jr. is a 64 y.o. male who is following up for multiple issues including right carpal tunnel syndrome, right thumb CMC arthritis and right hand gout, he has been taking Indocin and colchicine, previous treatment also included right carpal tunnel steroid injection and right thumb CMC steroid injection, these provided great relief of symptoms including numbness, paresthesias, night pain and thumb base pain for at least 3 months and then symptoms started to recur.      ROS/Meds/PSH/PMH/FH/SH: I personally reviewed the patients standard intake form.  Pertinents are discussed in the HPI    Physical Examination:  General: Awake and alert.  HEENT: Normocephalic, atraumatic  CV/Pulm: Breathing even and unlabored  Skin: No obvious rashes noted.  Lymphatic: No obvious evidence of lymphedema or lymphadenopathy    Musculoskeletal Examination:  Examination on the right upper extremity demonstrates cap refill < 5 seconds in all fingers, normal sensation in all fingers, positive provocative maneuvers for carpal tunnel syndrome, tenderness palpation of the right thumb CMC joint is mild today, he can make a full fist.    Imaging / Electrodiagnostic Tests:     Laboratory work was reviewed with the patient, negative rheumatoid factor, negative antinuclear antibody, elevated uric acid at 8.4    Nerve conduction study was reviewed with the patient, this demonstrates moderate right carpal tunnel syndrome with sensory latency of 4.5, motor latency of 4.4, very mild left carpal tunnel syndrome with sensory latency of 3.8, motor latency of 3.9    Assessment:   1. Inflammatory

## 2025-03-03 RX ORDER — METOPROLOL SUCCINATE 25 MG/1
25 TABLET, EXTENDED RELEASE ORAL EVERY MORNING
Qty: 30 TABLET | Refills: 5 | Status: SHIPPED | OUTPATIENT
Start: 2025-03-03

## 2025-03-23 ENCOUNTER — PATIENT MESSAGE (OUTPATIENT)
Age: 65
End: 2025-03-23

## 2025-03-24 RX ORDER — EZETIMIBE 10 MG/1
10 TABLET ORAL DAILY
Qty: 30 TABLET | Refills: 11 | Status: SHIPPED | OUTPATIENT
Start: 2025-03-24

## 2025-03-24 NOTE — TELEPHONE ENCOUNTER
Requested Prescriptions     Pending Prescriptions Disp Refills    ezetimibe (ZETIA) 10 MG tablet 30 tablet 11     Sig: Take 1 tablet by mouth daily

## 2025-03-25 RX ORDER — METOPROLOL SUCCINATE 25 MG/1
25 TABLET, EXTENDED RELEASE ORAL EVERY MORNING
Qty: 90 TABLET | Refills: 1 | Status: SHIPPED | OUTPATIENT
Start: 2025-03-25

## 2025-03-31 RX ORDER — OLMESARTAN MEDOXOMIL 20 MG/1
20 TABLET ORAL DAILY
Qty: 90 TABLET | Refills: 1 | Status: SHIPPED | OUTPATIENT
Start: 2025-03-31

## 2025-04-01 ENCOUNTER — OFFICE VISIT (OUTPATIENT)
Age: 65
End: 2025-04-01
Payer: COMMERCIAL

## 2025-04-01 VITALS
HEIGHT: 66 IN | DIASTOLIC BLOOD PRESSURE: 60 MMHG | HEART RATE: 72 BPM | SYSTOLIC BLOOD PRESSURE: 90 MMHG | WEIGHT: 159.8 LBS | BODY MASS INDEX: 25.68 KG/M2

## 2025-04-01 DIAGNOSIS — I10 ESSENTIAL HYPERTENSION: Primary | ICD-10-CM

## 2025-04-01 DIAGNOSIS — Z78.9 STATIN INTOLERANCE: ICD-10-CM

## 2025-04-01 DIAGNOSIS — R94.39 ABNORMAL STRESS TEST: ICD-10-CM

## 2025-04-01 DIAGNOSIS — I25.10 ASCVD (ARTERIOSCLEROTIC CARDIOVASCULAR DISEASE): ICD-10-CM

## 2025-04-01 DIAGNOSIS — E11.65 TYPE 2 DIABETES MELLITUS WITH HYPERGLYCEMIA, WITHOUT LONG-TERM CURRENT USE OF INSULIN (HCC): ICD-10-CM

## 2025-04-01 PROCEDURE — 99214 OFFICE O/P EST MOD 30 MIN: CPT | Performed by: INTERNAL MEDICINE

## 2025-04-01 PROCEDURE — 93000 ELECTROCARDIOGRAM COMPLETE: CPT | Performed by: INTERNAL MEDICINE

## 2025-04-01 PROCEDURE — 3074F SYST BP LT 130 MM HG: CPT | Performed by: INTERNAL MEDICINE

## 2025-04-01 PROCEDURE — 3078F DIAST BP <80 MM HG: CPT | Performed by: INTERNAL MEDICINE

## 2025-04-01 PROCEDURE — 1124F ACP DISCUSS-NO DSCNMKR DOCD: CPT | Performed by: INTERNAL MEDICINE

## 2025-04-01 NOTE — PROGRESS NOTES
[address], [facility] [address]  [place] [address]  PHONE: [phone]    SUBJECTIVE:   Pranay Estevez Jr. is a 64 y.o. male 1960   seen for a follow up visit regarding the following:     Chief Complaint   Patient presents with    Coronary Artery Disease    Hypertension    Hyperlipidemia         History of Present Illness  The patient is a 64-year-old male with a history of atherosclerotic cardiovascular disease (ASCVD) status post-coronary artery bypass grafting (CABG), presenting for evaluation of ASCVD, hyperlipidemia, diabetes mellitus, and hypertension.    The patient reports overall good health, with the exception of persistent dorsalgia, which is managed with tramadol, and neuropathic pain in the feet, described as constant but tolerable.    He denies any cardiac concerns, including angina or dyspnea. He occasionally experiences pain from internal scar tissue during deep inhalation, which is less severe than the pain experienced prior to CABG.    His current weight is 160 pounds as measured on the clinic scale, and 150 pounds on his home scale. He expresses a desire to reduce his weight to below 145 pounds. His blood glucose levels range from 99 to 103 mg/dL. He follows a diet rich in fruits and vegetables and uses Ozempic for diabetes management.    The patient believes his potassium levels are normalized.    PAST SURGICAL HISTORY: Coronary artery bypass grafting (CABG).    SOCIAL HISTORY  - Occupation: Customer modification at [employer]  - Diet: Rich in fruits and vegetables        Interval history:   history of coronary artery disease status post coronary artery bypass grafting.  The patient was previously followed by Dr. Kahlil Hebert prior to his [facility].     Cardiac history:  10/2022 coronary artery bypass grafting LIMA to LAD SVG to diagonal SVG to indeterminate coronary artery SVG to PDA  9/2022  Left Ventricle: Normal left ventricular systolic function with a

## 2025-05-08 SDOH — ECONOMIC STABILITY: TRANSPORTATION INSECURITY
IN THE PAST 12 MONTHS, HAS THE LACK OF TRANSPORTATION KEPT YOU FROM MEDICAL APPOINTMENTS OR FROM GETTING MEDICATIONS?: NO

## 2025-05-08 SDOH — ECONOMIC STABILITY: FOOD INSECURITY: WITHIN THE PAST 12 MONTHS, YOU WORRIED THAT YOUR FOOD WOULD RUN OUT BEFORE YOU GOT MONEY TO BUY MORE.: NEVER TRUE

## 2025-05-08 SDOH — ECONOMIC STABILITY: FOOD INSECURITY: WITHIN THE PAST 12 MONTHS, THE FOOD YOU BOUGHT JUST DIDN'T LAST AND YOU DIDN'T HAVE MONEY TO GET MORE.: NEVER TRUE

## 2025-05-08 SDOH — ECONOMIC STABILITY: INCOME INSECURITY: IN THE LAST 12 MONTHS, WAS THERE A TIME WHEN YOU WERE NOT ABLE TO PAY THE MORTGAGE OR RENT ON TIME?: NO

## 2025-05-08 ASSESSMENT — PATIENT HEALTH QUESTIONNAIRE - PHQ9
2. FEELING DOWN, DEPRESSED OR HOPELESS: NOT AT ALL
1. LITTLE INTEREST OR PLEASURE IN DOING THINGS: NOT AT ALL
SUM OF ALL RESPONSES TO PHQ9 QUESTIONS 1 & 2: 0
2. FEELING DOWN, DEPRESSED OR HOPELESS: NOT AT ALL
1. LITTLE INTEREST OR PLEASURE IN DOING THINGS: NOT AT ALL
SUM OF ALL RESPONSES TO PHQ QUESTIONS 1-9: 0

## 2025-05-09 ENCOUNTER — OFFICE VISIT (OUTPATIENT)
Dept: INTERNAL MEDICINE CLINIC | Facility: CLINIC | Age: 65
End: 2025-05-09
Payer: COMMERCIAL

## 2025-05-09 VITALS
TEMPERATURE: 97.2 F | HEIGHT: 66 IN | WEIGHT: 156.2 LBS | OXYGEN SATURATION: 98 % | BODY MASS INDEX: 25.1 KG/M2 | HEART RATE: 69 BPM | DIASTOLIC BLOOD PRESSURE: 74 MMHG | SYSTOLIC BLOOD PRESSURE: 132 MMHG

## 2025-05-09 DIAGNOSIS — I10 ESSENTIAL HYPERTENSION: ICD-10-CM

## 2025-05-09 DIAGNOSIS — E13.42 OTHER SPECIFIED DIABETES MELLITUS WITH DIABETIC POLYNEUROPATHY, WITHOUT LONG-TERM CURRENT USE OF INSULIN (HCC): ICD-10-CM

## 2025-05-09 DIAGNOSIS — E78.2 MIXED HYPERLIPIDEMIA: ICD-10-CM

## 2025-05-09 DIAGNOSIS — I25.118 CORONARY ARTERY DISEASE OF NATIVE ARTERY OF NATIVE HEART WITH STABLE ANGINA PECTORIS: Primary | ICD-10-CM

## 2025-05-09 DIAGNOSIS — M15.0 PRIMARY OSTEOARTHRITIS INVOLVING MULTIPLE JOINTS: ICD-10-CM

## 2025-05-09 LAB
ALBUMIN SERPL-MCNC: 3.9 G/DL (ref 3.2–4.6)
ALBUMIN/GLOB SERPL: 1.2 (ref 1–1.9)
ALP SERPL-CCNC: 104 U/L (ref 40–129)
ALT SERPL-CCNC: 25 U/L (ref 8–55)
ANION GAP SERPL CALC-SCNC: 10 MMOL/L (ref 7–16)
AST SERPL-CCNC: 28 U/L (ref 15–37)
BASOPHILS # BLD: 0.07 K/UL (ref 0–0.2)
BASOPHILS NFR BLD: 1 % (ref 0–2)
BILIRUB SERPL-MCNC: 0.5 MG/DL (ref 0–1.2)
BUN SERPL-MCNC: 26 MG/DL (ref 8–23)
CALCIUM SERPL-MCNC: 9.6 MG/DL (ref 8.8–10.2)
CHLORIDE SERPL-SCNC: 101 MMOL/L (ref 98–107)
CHOLEST SERPL-MCNC: 143 MG/DL (ref 0–200)
CO2 SERPL-SCNC: 26 MMOL/L (ref 20–29)
CREAT SERPL-MCNC: 1.38 MG/DL (ref 0.8–1.3)
DIFFERENTIAL METHOD BLD: ABNORMAL
EOSINOPHIL # BLD: 0.13 K/UL (ref 0–0.8)
EOSINOPHIL NFR BLD: 1.9 % (ref 0.5–7.8)
ERYTHROCYTE [DISTWIDTH] IN BLOOD BY AUTOMATED COUNT: 14.9 % (ref 11.9–14.6)
EST. AVERAGE GLUCOSE BLD GHB EST-MCNC: 123 MG/DL
GLOBULIN SER CALC-MCNC: 3.2 G/DL (ref 2.3–3.5)
GLUCOSE SERPL-MCNC: 105 MG/DL (ref 70–99)
HBA1C MFR BLD: 5.9 % (ref 0–5.6)
HCT VFR BLD AUTO: 38.5 % (ref 41.1–50.3)
HDLC SERPL-MCNC: 32 MG/DL (ref 40–60)
HDLC SERPL: 4.4 (ref 0–5)
HGB BLD-MCNC: 12.5 G/DL (ref 13.6–17.2)
IMM GRANULOCYTES # BLD AUTO: 0.02 K/UL (ref 0–0.5)
IMM GRANULOCYTES NFR BLD AUTO: 0.3 % (ref 0–5)
LDLC SERPL CALC-MCNC: 86 MG/DL (ref 0–100)
LYMPHOCYTES # BLD: 1.68 K/UL (ref 0.5–4.6)
LYMPHOCYTES NFR BLD: 24.1 % (ref 13–44)
MCH RBC QN AUTO: 28.6 PG (ref 26.1–32.9)
MCHC RBC AUTO-ENTMCNC: 32.5 G/DL (ref 31.4–35)
MCV RBC AUTO: 88.1 FL (ref 82–102)
MONOCYTES # BLD: 0.96 K/UL (ref 0.1–1.3)
MONOCYTES NFR BLD: 13.8 % (ref 4–12)
NEUTS SEG # BLD: 4.1 K/UL (ref 1.7–8.2)
NEUTS SEG NFR BLD: 58.9 % (ref 43–78)
NRBC # BLD: 0 K/UL (ref 0–0.2)
PLATELET # BLD AUTO: 224 K/UL (ref 150–450)
PMV BLD AUTO: 12.8 FL (ref 9.4–12.3)
POTASSIUM SERPL-SCNC: 5.7 MMOL/L (ref 3.5–5.1)
PROT SERPL-MCNC: 7.1 G/DL (ref 6.3–8.2)
RBC # BLD AUTO: 4.37 M/UL (ref 4.23–5.6)
SODIUM SERPL-SCNC: 138 MMOL/L (ref 136–145)
TRIGL SERPL-MCNC: 126 MG/DL (ref 0–150)
VLDLC SERPL CALC-MCNC: 25 MG/DL (ref 6–23)
WBC # BLD AUTO: 7 K/UL (ref 4.3–11.1)

## 2025-05-09 PROCEDURE — 3075F SYST BP GE 130 - 139MM HG: CPT | Performed by: INTERNAL MEDICINE

## 2025-05-09 PROCEDURE — 99214 OFFICE O/P EST MOD 30 MIN: CPT | Performed by: INTERNAL MEDICINE

## 2025-05-09 PROCEDURE — 1124F ACP DISCUSS-NO DSCNMKR DOCD: CPT | Performed by: INTERNAL MEDICINE

## 2025-05-09 PROCEDURE — 3078F DIAST BP <80 MM HG: CPT | Performed by: INTERNAL MEDICINE

## 2025-05-09 NOTE — PROGRESS NOTES
HPI: Pranay Estevez  (: 1960)    Will update labs today    Still working but states he is slowing down but wants to work another year      Problem List:  Patient Active Problem List   Diagnosis   • Mixed hyperlipidemia   • Other bursal cyst, right hand   • Restless leg syndrome   • Controlled type 2 diabetes mellitus without complication, without long-term current use of insulin ([facility])   • Weakness of both legs   • Bilateral foot pain   • Gastroesophageal reflux disease without esophagitis   • Essential hypertension   • High foot arch   • Chronic SI joint pain   • Agatston coronary artery calcium score greater than 400   • Abnormal stress test   • CAD, multiple vessel   • Dyspnea   • Coronary artery disease of native artery of native heart with stable angina pectoris   • S/P CABG x 4   • Other specified diabetes mellitus with diabetic polyneuropathy, without long-term current use of insulin ([facility])   • Spinal stenosis of lumbar region with neurogenic claudication   • Primary osteoarthritis involving multiple joints   • Bilateral carpal tunnel syndrome       History:  Past Medical History:   Diagnosis Date   • Abnormal finding on EKG 2020    low voltage and incomplete RBBB   • Anxiety and depression    • Bilateral foot pain    • Chronic back pain    • Chronic back pain    • Coronary artery disease     scheduled for CABG 10/17/22   • Diabetes ([facility])     type 2- metformin- A1C 6.7 (10/13/22)- denies hypo episodes   • Former smoker, stopped smoking in distant past     quit -  0.5 ppd x 26 years   • H/O echocardiogram 2022    Echo LVEF 50-55%   • HTN (hypertension)    • Hx stress fracture     left foot   • Impingement syndrome of right shoulder     Dr. Urena   • Insomnia    • Lumbar spinal stenosis    • Periodontal disease due to type 2 diabetes mellitus ([facility])     Dr. Antonio Goodman- dentist   • Plantar fasciitis, bilateral    • RLS (restless legs syndrome)        Allergies:  Allergies

## 2025-05-12 ENCOUNTER — RESULTS FOLLOW-UP (OUTPATIENT)
Dept: INTERNAL MEDICINE CLINIC | Facility: CLINIC | Age: 65
End: 2025-05-12

## 2025-05-12 DIAGNOSIS — I10 ESSENTIAL HYPERTENSION: Primary | ICD-10-CM

## 2025-05-23 ENCOUNTER — OFFICE VISIT (OUTPATIENT)
Age: 65
End: 2025-05-23

## 2025-05-23 DIAGNOSIS — M18.0 ARTHRITIS OF CARPOMETACARPAL (CMC) JOINT OF BOTH THUMBS: Primary | ICD-10-CM

## 2025-05-23 RX ORDER — BETAMETHASONE SODIUM PHOSPHATE AND BETAMETHASONE ACETATE 3; 3 MG/ML; MG/ML
6 INJECTION, SUSPENSION INTRA-ARTICULAR; INTRALESIONAL; INTRAMUSCULAR; SOFT TISSUE ONCE
Status: COMPLETED | OUTPATIENT
Start: 2025-05-23 | End: 2025-05-23

## 2025-05-23 RX ADMIN — BETAMETHASONE SODIUM PHOSPHATE AND BETAMETHASONE ACETATE 6 MG: 3; 3 INJECTION, SUSPENSION INTRA-ARTICULAR; INTRALESIONAL; INTRAMUSCULAR; SOFT TISSUE at 11:35

## 2025-05-24 NOTE — PROGRESS NOTES
1. Arthritis of carpometacarpal (CMC) joint of both thumbs      CTS is not causing issues at this point, he would like to have BL thumb C MC injections.    Procedure Note    The risk, benefits and alternatives of injection and no injection therapy were discussed. The patient consented for an injection. Time out performed. The patient has been identified by name and birthdate. The injection site was identified, marked and prepped with a alcohol swab. The Bilateral thumb CMC joint was injected with 0.5ml of 6mg/ml Celestone and 0.5ml of Lidocaine plain 1%. The injection site was then dressed with a bandaid. The patient tolerated the injection well. The patient was instructed to monitor their blood sugars if diabetic and call if any concerns. The patient was instructed to call the office if any adverse local effects occurred or any if any questions or concerns arise.    Jarad Mckinney Jr, MD, PhD  Orthopaedic Surgery  05/24/25  8:10 AM

## 2025-05-28 DIAGNOSIS — I10 ESSENTIAL HYPERTENSION: ICD-10-CM

## 2025-05-28 LAB
ANION GAP SERPL CALC-SCNC: 10 MMOL/L (ref 7–16)
BUN SERPL-MCNC: 20 MG/DL (ref 8–23)
CALCIUM SERPL-MCNC: 9.9 MG/DL (ref 8.8–10.2)
CHLORIDE SERPL-SCNC: 98 MMOL/L (ref 98–107)
CO2 SERPL-SCNC: 28 MMOL/L (ref 20–29)
CREAT SERPL-MCNC: 1.2 MG/DL (ref 0.8–1.3)
GLUCOSE SERPL-MCNC: 161 MG/DL (ref 70–99)
POTASSIUM SERPL-SCNC: 5.2 MMOL/L (ref 3.5–5.1)
SODIUM SERPL-SCNC: 136 MMOL/L (ref 136–145)

## 2025-06-02 ENCOUNTER — RESULTS FOLLOW-UP (OUTPATIENT)
Dept: INTERNAL MEDICINE CLINIC | Facility: CLINIC | Age: 65
End: 2025-06-02

## (undated) DEVICE — KIT CATH L11.5CM DIA9FR CTRL VEN POLYUR ANTIMIC COAT DBL

## (undated) DEVICE — SOLUTION IRRIG 1000ML 09% SOD CHL USP PIC PLAS CONTAINER

## (undated) DEVICE — CATHETER COR DIAG JUDKINS R 5.0 CRV 6FR 100CM 0 SIDE H

## (undated) DEVICE — CATHETER GUID AD 6FR L100CM COR PERIPH GRN NYL PTFE XB L 3

## (undated) DEVICE — CATHETER URETH BLLN 5CC 12FR SIL ALLY AND HYDRGEL F INF

## (undated) DEVICE — SUTURE PROL SZ 7-0 L24IN NONABSORBABLE BLU L9.3MM BV-1 3/8 M8702

## (undated) DEVICE — CANISTER, RIGID, 3000CC: Brand: MEDLINE INDUSTRIES, INC.

## (undated) DEVICE — DRAIN SURG SGL COLL PT TB FOR ATS BG OASIS

## (undated) DEVICE — GUIDEWIRE VASC J 3 CM 0.014 INX190 CM BAL MIDWT 1001780J

## (undated) DEVICE — GLIDESHEATH SLENDER STAINLESS STEEL KIT: Brand: GLIDESHEATH SLENDER

## (undated) DEVICE — SYSTEM ENDOSCP VES HARV W/ TOOL CANN SEAL SHT PRT BLNT TIP

## (undated) DEVICE — GUIDEWIRE VASC L260CM DIA0.035IN RAD 3MM J TIP L7CM PTFE

## (undated) DEVICE — FLOTRAC SENSOR WITH VAMP SYSTEM 60" / 152CM: Brand: FLOTRAC, VAMP

## (undated) DEVICE — CATHETER COR DIAG JUDKINS L 3.5 CRV 6FR 100CM 0 SIDE H

## (undated) DEVICE — DEVICE COMPR REG 24 CM VASC BND

## (undated) DEVICE — PRESSURE MONITORING SET: Brand: TRUWAVE, VAMP PLUS

## (undated) DEVICE — AUTOTRANSFUSION KIT 120 MH FAST STRT AT1 FOR CATS +

## (undated) DEVICE — COPILOT BLEEDBACK CONTROL VALVE: Brand: COPILOT

## (undated) DEVICE — GUIDEWIRE 035IN 210CM PTFE COAT FIX COR J TIP 15MM FIRM BODY

## (undated) DEVICE — SOLUTION IRRIG 3000ML 0.9% SOD CHL USP UROMATIC PLAS CONT

## (undated) DEVICE — MPS® DELIVERY SET WITH 6 FT. DELIVERY TUBING: Brand: MPS

## (undated) DEVICE — CATHETER DIAG 6FR L110CM PIGTAILS CRV STYL PIG145 DXTERITY

## (undated) DEVICE — CABG DR DENNIS: Brand: MEDLINE INDUSTRIES, INC.

## (undated) DEVICE — SOLUTION IRRIG 1000ML LAC RINGER PLAS POUR BTL

## (undated) DEVICE — PRESSURE GUIDEWIRE: Brand: COMET™ II

## (undated) DEVICE — CATHETER THOR 32FR L23IN PVC 6 EYELET STR ATRAUM

## (undated) DEVICE — CATHETER ETER TY TEMP SENS F MBO W URIN M FOLLOWS CDC GUIDELINES TO

## (undated) DEVICE — PERFUSION PACK XCOATING 76320] TERUMO CARDIOVASCULAR]

## (undated) DEVICE — PERCUTANEOUS INSERTION KIT-ARTERIAL: Brand: PERCUTANEOUS INSERTION KIT-ARTERIAL